# Patient Record
Sex: FEMALE | Race: OTHER | HISPANIC OR LATINO | URBAN - METROPOLITAN AREA
[De-identification: names, ages, dates, MRNs, and addresses within clinical notes are randomized per-mention and may not be internally consistent; named-entity substitution may affect disease eponyms.]

---

## 2022-07-17 ENCOUNTER — INPATIENT (INPATIENT)
Facility: HOSPITAL | Age: 22
LOS: 35 days | Discharge: HOME | DRG: 885 | End: 2022-08-22
Attending: PSYCHIATRY & NEUROLOGY | Admitting: PSYCHIATRY & NEUROLOGY
Payer: COMMERCIAL

## 2022-07-17 VITALS
TEMPERATURE: 98 F | DIASTOLIC BLOOD PRESSURE: 81 MMHG | OXYGEN SATURATION: 100 % | SYSTOLIC BLOOD PRESSURE: 121 MMHG | HEART RATE: 101 BPM | RESPIRATION RATE: 18 BRPM

## 2022-07-17 RX ORDER — DIPHENHYDRAMINE HCL 50 MG
50 CAPSULE ORAL ONCE
Refills: 0 | Status: COMPLETED | OUTPATIENT
Start: 2022-07-17 | End: 2022-07-17

## 2022-07-17 RX ORDER — HALOPERIDOL DECANOATE 100 MG/ML
5 INJECTION INTRAMUSCULAR ONCE
Refills: 0 | Status: COMPLETED | OUTPATIENT
Start: 2022-07-17 | End: 2022-07-17

## 2022-07-17 RX ORDER — DIPHENHYDRAMINE HCL 50 MG
50 CAPSULE ORAL EVERY 6 HOURS
Refills: 0 | Status: DISCONTINUED | OUTPATIENT
Start: 2022-07-17 | End: 2022-08-22

## 2022-07-17 RX ORDER — TRAZODONE HCL 50 MG
50 TABLET ORAL AT BEDTIME
Refills: 0 | Status: DISCONTINUED | OUTPATIENT
Start: 2022-07-17 | End: 2022-08-22

## 2022-07-17 RX ORDER — HALOPERIDOL DECANOATE 100 MG/ML
5 INJECTION INTRAMUSCULAR EVERY 6 HOURS
Refills: 0 | Status: DISCONTINUED | OUTPATIENT
Start: 2022-07-17 | End: 2022-08-15

## 2022-07-17 RX ORDER — ACETAMINOPHEN 500 MG
650 TABLET ORAL EVERY 6 HOURS
Refills: 0 | Status: DISCONTINUED | OUTPATIENT
Start: 2022-07-17 | End: 2022-08-22

## 2022-07-17 RX ORDER — MAGNESIUM HYDROXIDE 400 MG/1
30 TABLET, CHEWABLE ORAL DAILY
Refills: 0 | Status: DISCONTINUED | OUTPATIENT
Start: 2022-07-17 | End: 2022-08-22

## 2022-07-17 RX ADMIN — Medication 50 MILLIGRAM(S): at 22:29

## 2022-07-17 NOTE — BH PATIENT PROFILE - FALL HARM RISK - UNIVERSAL INTERVENTIONS
Instruct patient to call for assistance before getting out of bed or chair/Non-slip footwear when patient is out of bed/Physically safe environment - no spills, clutter or unnecessary equipment/Purposeful Proactive Rounding/Room/bathroom lighting operational, light cord in reach
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
## 2022-07-18 LAB
A1C WITH ESTIMATED AVERAGE GLUCOSE RESULT: 4.7 % — SIGNIFICANT CHANGE UP (ref 4–5.6)
CHOLEST SERPL-MCNC: 147 MG/DL — SIGNIFICANT CHANGE UP
COVID-19 SPIKE DOMAIN AB INTERP: POSITIVE
COVID-19 SPIKE DOMAIN ANTIBODY RESULT: >250 U/ML — HIGH
ESTIMATED AVERAGE GLUCOSE: 88 MG/DL — SIGNIFICANT CHANGE UP (ref 68–114)
HDLC SERPL-MCNC: 47 MG/DL — LOW
LIPID PNL WITH DIRECT LDL SERPL: 89 MG/DL — SIGNIFICANT CHANGE UP
NON HDL CHOLESTEROL: 100 MG/DL — SIGNIFICANT CHANGE UP
SARS-COV-2 IGG+IGM SERPL QL IA: >250 U/ML — HIGH
SARS-COV-2 IGG+IGM SERPL QL IA: POSITIVE
SARS-COV-2 RNA SPEC QL NAA+PROBE: SIGNIFICANT CHANGE UP
TRIGL SERPL-MCNC: 57 MG/DL — SIGNIFICANT CHANGE UP
TSH SERPL-MCNC: 1.3 UIU/ML — SIGNIFICANT CHANGE UP (ref 0.27–4.2)

## 2022-07-18 PROCEDURE — 99221 1ST HOSP IP/OBS SF/LOW 40: CPT

## 2022-07-18 RX ORDER — VENLAFAXINE HCL 75 MG
37.5 CAPSULE, EXT RELEASE 24 HR ORAL AT BEDTIME
Refills: 0 | Status: DISCONTINUED | OUTPATIENT
Start: 2022-07-18 | End: 2022-07-20

## 2022-07-18 RX ADMIN — Medication 650 MILLIGRAM(S): at 22:30

## 2022-07-18 RX ADMIN — Medication 650 MILLIGRAM(S): at 21:33

## 2022-07-18 RX ADMIN — Medication 37.5 MILLIGRAM(S): at 22:01

## 2022-07-18 NOTE — BH INPATIENT PSYCHIATRY ASSESSMENT NOTE - NSTXPSYCHOINTERMD_PSY_ALL_CORE
Individual psychotherapy with patient to assist pt in understanding AVH and other psychotic symptoms. Ativan and prozac for catatonia/anxiety and depression/anxiety respectively . TOO if pt continues not to take meds.
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
## 2022-07-18 NOTE — BH INPATIENT PSYCHIATRY ASSESSMENT NOTE - NSBHMSELANG_PSY_A_CORE
Chaperone for Intimate Exam    1. Was chaperone offered as part of the rooming process? offered, declined   2. If Chaperone is declined by patient, NA: chaperone was available and exam completed  3.  Chaperone is n/a
Diagnostic Psa   Date Value Ref Range Status   05/05/2014 1.41 0.00 - 5.40 ng/mL Final   04/24/2013 1.3 0.0 - 4.0 ng/mL Final         Assessment: This is a 70 yo white male with h/o HTN, kidney stones, BPH w/LUTs on Proscar and off Flomax for unclear reasons with worsened LUTs likely due to being off Flomax and will resume. The proper dosing and SE were reviewed. He has a stable and normal PSA  even when corrected for use of 5ARI is elevated. Will continue with yearly follow-up and pt agrees. Plan:      1. F/U 1 yr for PSA  2.  Flomax 0.4 mg pod daily, #90 with 3 refills
No abnormalities noted
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
## 2022-07-18 NOTE — BH INPATIENT PSYCHIATRY ASSESSMENT NOTE - NSSUICPROTFACT_PSY_ALL_CORE
Responsibility to children, family, or others/Supportive social network of family or friends/Cultural, spiritual and/or moral attitudes against suicide/Engaged in work or school/Yarsani beliefs
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
## 2022-07-18 NOTE — BH INPATIENT PSYCHIATRY ASSESSMENT NOTE - NSBHMETABOLIC_PSY_ALL_CORE_FT
BMI: BMI (kg/m2): 22.9 (07-17-22 @ 22:37)  HbA1c: A1C with Estimated Average Glucose Result: 4.7 % (07-18-22 @ 08:41)    Glucose:   BP: 106/72 (07-18-22 @ 09:00) (106/72 - 121/81)  Lipid Panel: Date/Time: 07-18-22 @ 08:41  Cholesterol, Serum: 147  Direct LDL: --  HDL Cholesterol, Serum: 47  Total Cholesterol/HDL Ration Measurement: --  Triglycerides, Serum: 57   BMI: BMI (kg/m2): 22.9 (07-17-22 @ 22:37)  HbA1c: A1C with Estimated Average Glucose Result: 4.7 % (07-18-22 @ 08:41)    Glucose:   BP: 107/72 (08-22-22 @ 09:00) (105/71 - 110/73)  Lipid Panel: Date/Time: 07-18-22 @ 08:41  Cholesterol, Serum: 147  Direct LDL: --  HDL Cholesterol, Serum: 47  Total Cholesterol/HDL Ration Measurement: --  Triglycerides, Serum: 57

## 2022-07-18 NOTE — BH INPATIENT PSYCHIATRY ASSESSMENT NOTE - CURRENT MEDICATION
MEDICATIONS  (STANDING):  venlafaxine 37.5 milliGRAM(s) Oral at bedtime    MEDICATIONS  (PRN):  acetaminophen     Tablet .. 650 milliGRAM(s) Oral every 6 hours PRN Moderate Pain (4 - 6)  aluminum hydroxide/magnesium hydroxide/simethicone Suspension 30 milliLiter(s) Oral every 4 hours PRN Dyspepsia  diphenhydrAMINE 50 milliGRAM(s) Oral every 6 hours PRN agitation  haloperidol     Tablet 5 milliGRAM(s) Oral every 6 hours PRN agitation  LORazepam     Tablet 2 milliGRAM(s) Oral every 6 hours PRN agitation  magnesium hydroxide Suspension 30 milliLiter(s) Oral daily PRN Constipation  traZODone 50 milliGRAM(s) Oral at bedtime PRN insomnia   MEDICATIONS  (STANDING):    MEDICATIONS  (PRN):

## 2022-07-18 NOTE — BH INPATIENT PSYCHIATRY ASSESSMENT NOTE - NSBHCHARTREVIEWVS_PSY_A_CORE FT
Vital Signs Last 24 Hrs  T(C): 36.3 (07-18-22 @ 09:00), Max: 36.8 (07-17-22 @ 22:36)  T(F): 97.4 (07-18-22 @ 09:00), Max: 98.2 (07-17-22 @ 22:36)  HR: 105 (07-18-22 @ 09:00) (101 - 105)  BP: 106/72 (07-18-22 @ 09:00) (106/72 - 121/81)  BP(mean): --  RR: 18 (07-18-22 @ 09:00) (18 - 18)  SpO2: 100% (07-18-22 @ 09:00) (100% - 100%)     Vital Signs Last 24 Hrs  T(C): --  T(F): --  HR: --  BP: --  BP(mean): --  RR: --  SpO2: --
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
## 2022-07-18 NOTE — BH INPATIENT PSYCHIATRY ASSESSMENT NOTE - NSDCCRITERIA_PSY_ALL_CORE
Pt with improvement in depressive symptoms, no longer endorsing suicidality, decreased psychosis. Able to function safely and care for basic needs.

## 2022-07-18 NOTE — BH INPATIENT PSYCHIATRY ASSESSMENT NOTE - NSBHASSESSSUMMFT_PSY_ALL_CORE
Sharona Kumar is a 21 yo female with PMH asthma and PPH MDD, ODD, and cannabis use disorder who was bib mother to [facility] for concerns that pt was not eating, drinking, or acting like herself. Patient seen today where she demonstrates findings of subjective and objectively depressed affect, anhedonia, poor eating, sleeping, concentration, energy, and passive SI over a few weeks' duration. Patient also has some psychotic features including potential internal preoccupation and reports of AH. Findings consistent with major depressive episode with psychotic features. Patient considered to be of potential risk to herself due to depression and suicidality as well as unable to care for herself as she is not tending to her basic needs, requiring admission and treatment.     Plan:  #MDE with psychotic features  -Effexor 37.5 mg nightly for depression and anxiety  -Considering addition of SGA such as Abilify for psychotic features   -Encourage pt to attend groups and adhere to medications   -Attempt to gather further detailed history, establish rapport with pt   -Safety planning prior to discharge

## 2022-07-18 NOTE — BH INPATIENT PSYCHIATRY ASSESSMENT NOTE - NSACTIVEVENT_PSY_ALL_CORE
Triggering events leading to humiliation, shame, and/or despair (e.g., Loss of relationship, financial or health status) (real or anticipated)/Current sexual/physical abuse or other trauma/Current or pending social isolation/Substance intoxication or withdrawal

## 2022-07-18 NOTE — BH INPATIENT PSYCHIATRY ASSESSMENT NOTE - RISK ASSESSMENT
Violence: Low to moderate, pt recently agitated and required seclusion   Suicide: Moderate as pt meets criteria for MDE and reports passive suicidality  Self-harm: Moderate as pt has no history of prior self-injury but has recently been banging on walls which could lead to self-injury   Elopement: High, patient attempted to previously elope and fixated on discharge   Sexual: Low, no signs or concerning sexual behaviors
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
## 2022-07-18 NOTE — BH INPATIENT PSYCHIATRY ASSESSMENT NOTE - NSTXANXINTERMD_PSY_ALL_CORE
Educate pt on management of anxiety and panic attack treatment. Ativan PRN. Prozac for anxiety management and Zyprexa initiated for paranoia that appears to contribute to anxiety. Haldol 2mg bid initiated. PRNs available as needed

## 2022-07-18 NOTE — BH SOCIAL WORK INITIAL PSYCHOSOCIAL EVALUATION - OTHER PAST PSYCHIATRIC HISTORY (INCLUDE DETAILS REGARDING ONSET, COURSE OF ILLNESS, INPATIENT/OUTPATIENT TREATMENT)
PPH MDD, ODD, and cannabis use disorder who was BIB mother to [facility] for concerns that pt was not eating, drinking, or acting like herself.

## 2022-07-18 NOTE — CHART NOTE - NSCHARTNOTEFT_GEN_A_CORE
General: patient in no acute distress.   HEENT: no conjunctival erythema. No scleral icterus.   Respiratory: clear to auscultation bilaterally. No wheezes, rales, or rhonchi. Symmetric chest rise.   Cardiac: Normal S1 and S2. Regular rate and rhythm. No S3 or S4. No murmurs, rubs, or gallops.   GI: Soft. Non distended. Mildly tender to palpation on the left side of the abdomen diffusely. No organomegaly. No surgical scars. Normal bowel sounds.   Musculoskeletal: No calf tenderness or swelling.  Extremities: Pulses 2+ bilaterally in the upper and lower extremities.    Neurological: Patient is A&Ox3. Cranial nerves intact. Motor and sensory functions intact.

## 2022-07-18 NOTE — BH INPATIENT PSYCHIATRY ASSESSMENT NOTE - NSTXDEPRESINTERMD_PSY_ALL_CORE
Individual psychotherapy, Prozac. Thus far pt attends group activities. Patient speaking with psychologist more. Will administer PCL-5 once pt is further stabilized and able to participate.

## 2022-07-19 PROCEDURE — 99232 SBSQ HOSP IP/OBS MODERATE 35: CPT

## 2022-07-19 RX ORDER — IBUPROFEN 200 MG
400 TABLET ORAL EVERY 6 HOURS
Refills: 0 | Status: DISCONTINUED | OUTPATIENT
Start: 2022-07-19 | End: 2022-08-22

## 2022-07-19 RX ADMIN — Medication 37.5 MILLIGRAM(S): at 21:11

## 2022-07-19 RX ADMIN — Medication 650 MILLIGRAM(S): at 16:17

## 2022-07-19 RX ADMIN — Medication 30 MILLILITER(S): at 16:11

## 2022-07-19 RX ADMIN — Medication 650 MILLIGRAM(S): at 10:14

## 2022-07-19 RX ADMIN — Medication 650 MILLIGRAM(S): at 11:14

## 2022-07-19 RX ADMIN — Medication 650 MILLIGRAM(S): at 17:04

## 2022-07-19 NOTE — BH INPATIENT PSYCHIATRY PROGRESS NOTE - NSBHASSESSSUMMFT_PSY_ALL_CORE
Sharona Kumar is a 23 yo female with PMH asthma and PPH MDD, ODD, and cannabis use disorder who was bib mother to [facility] for concerns that pt was not eating, drinking, or acting like herself. Patient seen today where she demonstrates findings of subjective and objectively depressed affect, anhedonia, poor eating, sleeping, concentration, energy, and passive SI over a few weeks' duration. Patient also has some psychotic features including potential internal preoccupation and reports of AH. Findings consistent with major depressive episode with psychotic features. Patient considered to be of potential risk to herself due to depression and suicidality as well as unable to care for herself as she is not tending to her basic needs, requiring admission and treatment.     Plan:  #MDE with psychotic features  -Effexor 37.5 mg nightly for depression and anxiety  -Considering addition of SGA such as Abilify for psychotic features   -Encourage pt to attend groups and adhere to medications   -Attempt to gather further detailed history, establish rapport with pt   -Safety planning prior to discharge

## 2022-07-19 NOTE — BH TREATMENT PLAN - NSPTSTATEDGOAL_PSY_ALL_CORE
Pt requests help with depressed mood, develops goal to eat and drink more water.  "I want to drink more water."

## 2022-07-19 NOTE — BH INPATIENT PSYCHIATRY PROGRESS NOTE - NSBHMETABOLIC_PSY_ALL_CORE_FT
BMI: BMI (kg/m2): 22.9 (07-17-22 @ 22:37)  HbA1c: A1C with Estimated Average Glucose Result: 4.7 % (07-18-22 @ 08:41)    Glucose:   BP: 119/82 (07-19-22 @ 16:51) (106/72 - 124/82)  Lipid Panel: Date/Time: 07-18-22 @ 08:41  Cholesterol, Serum: 147  Direct LDL: --  HDL Cholesterol, Serum: 47  Total Cholesterol/HDL Ration Measurement: --  Triglycerides, Serum: 57   BMI: BMI (kg/m2): 22.9 (07-17-22 @ 22:37)  HbA1c: A1C with Estimated Average Glucose Result: 4.7 % (07-18-22 @ 08:41)    Glucose:   BP: 107/72 (08-22-22 @ 09:00) (105/71 - 110/73)  Lipid Panel: Date/Time: 07-18-22 @ 08:41  Cholesterol, Serum: 147  Direct LDL: --  HDL Cholesterol, Serum: 47  Total Cholesterol/HDL Ration Measurement: --  Triglycerides, Serum: 57

## 2022-07-19 NOTE — BH INPATIENT PSYCHIATRY PROGRESS NOTE - PRN MEDS
MEDICATIONS  (PRN):  acetaminophen     Tablet .. 650 milliGRAM(s) Oral every 6 hours PRN Moderate Pain (4 - 6)  aluminum hydroxide/magnesium hydroxide/simethicone Suspension 30 milliLiter(s) Oral every 4 hours PRN Dyspepsia  diphenhydrAMINE 50 milliGRAM(s) Oral every 6 hours PRN agitation  haloperidol     Tablet 5 milliGRAM(s) Oral every 6 hours PRN agitation  LORazepam     Tablet 2 milliGRAM(s) Oral every 6 hours PRN agitation  magnesium hydroxide Suspension 30 milliLiter(s) Oral daily PRN Constipation  traZODone 50 milliGRAM(s) Oral at bedtime PRN insomnia   MEDICATIONS  (PRN):

## 2022-07-19 NOTE — BH INPATIENT PSYCHIATRY PROGRESS NOTE - NSBHCHARTREVIEWVS_PSY_A_CORE FT
Vital Signs Last 24 Hrs  T(C): 36.9 (07-19-22 @ 16:51), Max: 36.9 (07-19-22 @ 16:51)  T(F): 98.5 (07-19-22 @ 16:51), Max: 98.5 (07-19-22 @ 16:51)  HR: 83 (07-19-22 @ 16:51) (83 - 117)  BP: 119/82 (07-19-22 @ 16:51) (119/82 - 124/82)  BP(mean): --  RR: 18 (07-19-22 @ 16:51) (18 - 18)  SpO2: 100% (07-19-22 @ 16:51) (99% - 100%)     Vital Signs Last 24 Hrs  T(C): --  T(F): --  HR: --  BP: --  BP(mean): --  RR: --  SpO2: --

## 2022-07-19 NOTE — BH INPATIENT PSYCHIATRY PROGRESS NOTE - NSBHFUPINTERVALHXFT_PSY_A_CORE
Pt seen today in her room where she is quiet but speaks with writer more today and interacts more. She still reports feeling depressed but states that she is feeling a little better from going to groups and coloring which she enjoys. Pt denies SI/intent/plan today and says that she has a headache which she believes is from the medication; of note, pt has poor appetite and had been eating very little and drinking less water. She is encouraged to stick with the medication and continue eating and hydrating to prevent headaches and PRN medication for headache prescribed. Patient reports that her mother visited and she was happy to see her but otherwise likes to keep to herself on the unit. Pt is confused as to whether she has AH or negative thoughts and writer encourages pt to open up more about her possible AH.

## 2022-07-20 LAB
ALBUMIN SERPL ELPH-MCNC: 4.6 G/DL — SIGNIFICANT CHANGE UP (ref 3.3–5)
ALP SERPL-CCNC: 80 U/L — SIGNIFICANT CHANGE UP (ref 40–120)
ALT FLD-CCNC: 19 U/L — SIGNIFICANT CHANGE UP (ref 10–45)
ANION GAP SERPL CALC-SCNC: 11 MMOL/L — SIGNIFICANT CHANGE UP (ref 5–17)
AST SERPL-CCNC: 23 U/L — SIGNIFICANT CHANGE UP (ref 10–40)
BILIRUB SERPL-MCNC: 0.6 MG/DL — SIGNIFICANT CHANGE UP (ref 0.2–1.2)
BUN SERPL-MCNC: 4 MG/DL — LOW (ref 7–23)
CALCIUM SERPL-MCNC: 9.5 MG/DL — SIGNIFICANT CHANGE UP (ref 8.4–10.5)
CHLORIDE SERPL-SCNC: 103 MMOL/L — SIGNIFICANT CHANGE UP (ref 96–108)
CO2 SERPL-SCNC: 26 MMOL/L — SIGNIFICANT CHANGE UP (ref 22–31)
CREAT SERPL-MCNC: 0.72 MG/DL — SIGNIFICANT CHANGE UP (ref 0.5–1.3)
EGFR: 123 ML/MIN/1.73M2 — SIGNIFICANT CHANGE UP
GLUCOSE SERPL-MCNC: 110 MG/DL — HIGH (ref 70–99)
HCG UR QL: NEGATIVE — SIGNIFICANT CHANGE UP
HCT VFR BLD CALC: 46.2 % — HIGH (ref 34.5–45)
HGB BLD-MCNC: 15.4 G/DL — SIGNIFICANT CHANGE UP (ref 11.5–15.5)
HIV 1+2 AB+HIV1 P24 AG SERPL QL IA: SIGNIFICANT CHANGE UP
MCHC RBC-ENTMCNC: 31.1 PG — SIGNIFICANT CHANGE UP (ref 27–34)
MCHC RBC-ENTMCNC: 33.3 GM/DL — SIGNIFICANT CHANGE UP (ref 32–36)
MCV RBC AUTO: 93.3 FL — SIGNIFICANT CHANGE UP (ref 80–100)
NRBC # BLD: 0 /100 WBCS — SIGNIFICANT CHANGE UP (ref 0–0)
PLATELET # BLD AUTO: 217 K/UL — SIGNIFICANT CHANGE UP (ref 150–400)
POTASSIUM SERPL-MCNC: 3.6 MMOL/L — SIGNIFICANT CHANGE UP (ref 3.5–5.3)
POTASSIUM SERPL-SCNC: 3.6 MMOL/L — SIGNIFICANT CHANGE UP (ref 3.5–5.3)
PROT SERPL-MCNC: 7.5 G/DL — SIGNIFICANT CHANGE UP (ref 6–8.3)
RBC # BLD: 4.95 M/UL — SIGNIFICANT CHANGE UP (ref 3.8–5.2)
RBC # FLD: 11.9 % — SIGNIFICANT CHANGE UP (ref 10.3–14.5)
SODIUM SERPL-SCNC: 140 MMOL/L — SIGNIFICANT CHANGE UP (ref 135–145)
TSH SERPL-MCNC: 1.16 UIU/ML — SIGNIFICANT CHANGE UP (ref 0.27–4.2)
WBC # BLD: 5.83 K/UL — SIGNIFICANT CHANGE UP (ref 3.8–10.5)
WBC # FLD AUTO: 5.83 K/UL — SIGNIFICANT CHANGE UP (ref 3.8–10.5)

## 2022-07-20 PROCEDURE — 99232 SBSQ HOSP IP/OBS MODERATE 35: CPT

## 2022-07-20 RX ORDER — FLUOXETINE HCL 10 MG
10 CAPSULE ORAL DAILY
Refills: 0 | Status: DISCONTINUED | OUTPATIENT
Start: 2022-07-21 | End: 2022-07-21

## 2022-07-20 RX ORDER — VENLAFAXINE HCL 75 MG
37.5 CAPSULE, EXT RELEASE 24 HR ORAL ONCE
Refills: 0 | Status: COMPLETED | OUTPATIENT
Start: 2022-07-20 | End: 2022-07-20

## 2022-07-20 RX ADMIN — Medication 50 MILLIGRAM(S): at 21:47

## 2022-07-20 RX ADMIN — Medication 400 MILLIGRAM(S): at 18:18

## 2022-07-20 NOTE — BH INPATIENT PSYCHIATRY PROGRESS NOTE - NSBHCHARTREVIEWVS_PSY_A_CORE FT
Vital Signs Last 24 Hrs  T(C): 36.6 (07-20-22 @ 09:00), Max: 36.6 (07-20-22 @ 09:00)  T(F): 97.9 (07-20-22 @ 09:00), Max: 97.9 (07-20-22 @ 09:00)  HR: 96 (07-20-22 @ 09:00) (96 - 96)  BP: 116/78 (07-20-22 @ 09:00) (116/78 - 116/78)  BP(mean): --  RR: 18 (07-20-22 @ 09:00) (18 - 18)  SpO2: 98% (07-20-22 @ 09:00) (98% - 98%)     Vital Signs Last 24 Hrs  T(C): 36.9 (07-20-22 @ 17:21), Max: 36.9 (07-20-22 @ 17:21)  T(F): 98.5 (07-20-22 @ 17:21), Max: 98.5 (07-20-22 @ 17:21)  HR: 90 (07-20-22 @ 17:21) (90 - 96)  BP: 114/76 (07-20-22 @ 17:21) (114/76 - 116/78)  BP(mean): --  RR: 18 (07-20-22 @ 17:21) (18 - 18)  SpO2: 98% (07-20-22 @ 17:21) (98% - 98%)     Vital Signs Last 24 Hrs  T(C): --  T(F): --  HR: --  BP: --  BP(mean): --  RR: --  SpO2: --

## 2022-07-20 NOTE — BH INPATIENT PSYCHIATRY PROGRESS NOTE - NSBHMETABOLIC_PSY_ALL_CORE_FT
BMI: BMI (kg/m2): 22.9 (07-17-22 @ 22:37)  HbA1c: A1C with Estimated Average Glucose Result: 4.7 % (07-18-22 @ 08:41)    Glucose:   BP: 116/78 (07-20-22 @ 09:00) (106/72 - 124/82)  Lipid Panel: Date/Time: 07-18-22 @ 08:41  Cholesterol, Serum: 147  Direct LDL: --  HDL Cholesterol, Serum: 47  Total Cholesterol/HDL Ration Measurement: --  Triglycerides, Serum: 57   BMI: BMI (kg/m2): 22.9 (07-17-22 @ 22:37)  HbA1c: A1C with Estimated Average Glucose Result: 4.7 % (07-18-22 @ 08:41)    Glucose:   BP: 114/76 (07-20-22 @ 17:21) (106/72 - 124/82)  Lipid Panel: Date/Time: 07-18-22 @ 08:41  Cholesterol, Serum: 147  Direct LDL: --  HDL Cholesterol, Serum: 47  Total Cholesterol/HDL Ration Measurement: --  Triglycerides, Serum: 57   BMI: BMI (kg/m2): 22.9 (07-17-22 @ 22:37)  HbA1c: A1C with Estimated Average Glucose Result: 4.7 % (07-18-22 @ 08:41)    Glucose:   BP: 107/72 (08-22-22 @ 09:00) (105/71 - 110/73)  Lipid Panel: Date/Time: 07-18-22 @ 08:41  Cholesterol, Serum: 147  Direct LDL: --  HDL Cholesterol, Serum: 47  Total Cholesterol/HDL Ration Measurement: --  Triglycerides, Serum: 57

## 2022-07-20 NOTE — BH INPATIENT PSYCHIATRY PROGRESS NOTE - NSBHASSESSSUMMFT_PSY_ALL_CORE
Sharona Kumar is a 23 yo female with PMH asthma and PPH MDD, ODD, and cannabis use disorder who was bib mother to [facility] for concerns that pt was not eating, drinking, or acting like herself. Patient seen today where she demonstrates findings of subjective and objectively depressed affect, anhedonia, poor eating, sleeping, concentration, energy, and passive SI over a few weeks' duration. Patient also has some psychotic features including potential internal preoccupation and reports of AH. Findings consistent with major depressive episode with psychotic features. Patient considered to be of potential risk to herself due to depression and suicidality as well as unable to care for herself as she is not tending to her basic needs, requiring admission and treatment.     Plan:  #MDE with psychotic features  -Effexor 37.5 mg nightly for depression and anxiety  -Considering addition of SGA such as Abilify for psychotic features   -Encourage pt to attend groups and adhere to medications   -Attempt to gather further detailed history, establish rapport with pt   -Safety planning prior to discharge  Sharona Kumar is a 23 yo female with PMH asthma and PPH MDD, ODD, and cannabis use disorder who was bib mother to [facility] for concerns that pt was not eating, drinking, or acting like herself. Patient seen today where she demonstrates findings of subjective and objectively depressed affect, anhedonia, poor eating, sleeping, concentration, energy, and passive SI over a few weeks' duration. Patient also has some psychotic features including potential internal preoccupation and reports of AH. Findings consistent with major depressive episode with psychotic features. Patient considered to be of potential risk to herself due to depression and suicidality as well as unable to care for herself as she is not tending to her basic needs, requiring admission and treatment.     7/20: Pt more open today, speaks about intimate topics with writer, voices concerns of pregnancy. Denies depressed state and rejects medications but eventually agrees to keep adhering to meds with much encouragement. PO intake increasing. Pt denies SI.     Plan:  #MDE with psychotic features  -Effexor 37.5 mg nightly for depression and anxiety  -Will switch to Prozac 10 mg per pt request 7/21  -Considering addition of SGA such as Abilify for psychotic features   -Encourage pt to attend groups and adhere to medications   -Attempt to gather further detailed history, establish rapport with pt   -Safety planning prior to discharge

## 2022-07-20 NOTE — BH INPATIENT PSYCHIATRY PROGRESS NOTE - CURRENT MEDICATION
MEDICATIONS  (STANDING):  venlafaxine 37.5 milliGRAM(s) Oral once    MEDICATIONS  (PRN):  acetaminophen     Tablet .. 650 milliGRAM(s) Oral every 6 hours PRN Moderate Pain (4 - 6)  aluminum hydroxide/magnesium hydroxide/simethicone Suspension 30 milliLiter(s) Oral every 4 hours PRN Dyspepsia  diphenhydrAMINE 50 milliGRAM(s) Oral every 6 hours PRN agitation  haloperidol     Tablet 5 milliGRAM(s) Oral every 6 hours PRN agitation  ibuprofen  Tablet. 400 milliGRAM(s) Oral every 6 hours PRN headaches  LORazepam     Tablet 2 milliGRAM(s) Oral every 6 hours PRN agitation  magnesium hydroxide Suspension 30 milliLiter(s) Oral daily PRN Constipation  traZODone 50 milliGRAM(s) Oral at bedtime PRN insomnia   MEDICATIONS  (STANDING):    MEDICATIONS  (PRN):
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
## 2022-07-20 NOTE — BH INPATIENT PSYCHIATRY PROGRESS NOTE - PRN MEDS
MEDICATIONS  (PRN):  acetaminophen     Tablet .. 650 milliGRAM(s) Oral every 6 hours PRN Moderate Pain (4 - 6)  aluminum hydroxide/magnesium hydroxide/simethicone Suspension 30 milliLiter(s) Oral every 4 hours PRN Dyspepsia  diphenhydrAMINE 50 milliGRAM(s) Oral every 6 hours PRN agitation  haloperidol     Tablet 5 milliGRAM(s) Oral every 6 hours PRN agitation  ibuprofen  Tablet. 400 milliGRAM(s) Oral every 6 hours PRN headaches  LORazepam     Tablet 2 milliGRAM(s) Oral every 6 hours PRN agitation  magnesium hydroxide Suspension 30 milliLiter(s) Oral daily PRN Constipation  traZODone 50 milliGRAM(s) Oral at bedtime PRN insomnia   MEDICATIONS  (PRN):

## 2022-07-21 LAB — T PALLIDUM AB TITR SER: NEGATIVE — SIGNIFICANT CHANGE UP

## 2022-07-21 PROCEDURE — 99232 SBSQ HOSP IP/OBS MODERATE 35: CPT

## 2022-07-21 RX ORDER — FLUOXETINE HCL 10 MG
10 CAPSULE ORAL DAILY
Refills: 0 | Status: DISCONTINUED | OUTPATIENT
Start: 2022-07-21 | End: 2022-07-22

## 2022-07-21 RX ADMIN — Medication 10 MILLIGRAM(S): at 11:57

## 2022-07-21 NOTE — BH INPATIENT PSYCHIATRY PROGRESS NOTE - CURRENT MEDICATION
MEDICATIONS  (STANDING):  FLUoxetine 10 milliGRAM(s) Oral daily    MEDICATIONS  (PRN):  acetaminophen     Tablet .. 650 milliGRAM(s) Oral every 6 hours PRN Moderate Pain (4 - 6)  aluminum hydroxide/magnesium hydroxide/simethicone Suspension 30 milliLiter(s) Oral every 4 hours PRN Dyspepsia  diphenhydrAMINE 50 milliGRAM(s) Oral every 6 hours PRN agitation  haloperidol     Tablet 5 milliGRAM(s) Oral every 6 hours PRN agitation  ibuprofen  Tablet. 400 milliGRAM(s) Oral every 6 hours PRN headaches  LORazepam     Tablet 2 milliGRAM(s) Oral every 6 hours PRN agitation  magnesium hydroxide Suspension 30 milliLiter(s) Oral daily PRN Constipation  traZODone 50 milliGRAM(s) Oral at bedtime PRN insomnia

## 2022-07-21 NOTE — BH INPATIENT PSYCHIATRY PROGRESS NOTE - PRN MEDS
MEDICATIONS  (PRN):  acetaminophen     Tablet .. 650 milliGRAM(s) Oral every 6 hours PRN Moderate Pain (4 - 6)  aluminum hydroxide/magnesium hydroxide/simethicone Suspension 30 milliLiter(s) Oral every 4 hours PRN Dyspepsia  diphenhydrAMINE 50 milliGRAM(s) Oral every 6 hours PRN agitation  haloperidol     Tablet 5 milliGRAM(s) Oral every 6 hours PRN agitation  ibuprofen  Tablet. 400 milliGRAM(s) Oral every 6 hours PRN headaches  LORazepam     Tablet 2 milliGRAM(s) Oral every 6 hours PRN agitation  magnesium hydroxide Suspension 30 milliLiter(s) Oral daily PRN Constipation  traZODone 50 milliGRAM(s) Oral at bedtime PRN insomnia

## 2022-07-21 NOTE — BH INPATIENT PSYCHIATRY PROGRESS NOTE - NSBHASSESSSUMMFT_PSY_ALL_CORE
Sharona Kumar is a 21 yo female with PMH asthma and PPH MDD, ODD, and cannabis use disorder who was bib mother to [facility] for concerns that pt was not eating, drinking, or acting like herself. Patient seen today where she demonstrates findings of subjective and objectively depressed affect, anhedonia, poor eating, sleeping, concentration, energy, and passive SI over a few weeks' duration. Patient also has some psychotic features including potential internal preoccupation and reports of AH. Findings consistent with major depressive episode with psychotic features. Patient considered to be of potential risk to herself due to depression and suicidality as well as unable to care for herself as she is not tending to her basic needs, requiring admission and treatment.     7/20: Pt more open today, speaks about intimate topics with writer, voices concerns of pregnancy. Denies depressed state and rejects medications but eventually agrees to keep adhering to meds with much encouragement. PO intake increasing. Pt denies SI.     Plan:  #MDE with psychotic features  -Effexor 37.5 mg nightly for depression and anxiety  -Will switch to Prozac 10 mg per pt request 7/21  -Considering addition of SGA such as Abilify for psychotic features   -Encourage pt to attend groups and adhere to medications   -Attempt to gather further detailed history, establish rapport with pt   -Safety planning prior to discharge

## 2022-07-21 NOTE — BH INPATIENT PSYCHIATRY PROGRESS NOTE - NSBHCHARTREVIEWVS_PSY_A_CORE FT
Vital Signs Last 24 Hrs  T(C): 37.1 (07-21-22 @ 08:22), Max: 37.1 (07-21-22 @ 08:22)  T(F): 98.7 (07-21-22 @ 08:22), Max: 98.7 (07-21-22 @ 08:22)  HR: 111 (07-21-22 @ 08:22) (90 - 111)  BP: 98/62 (07-21-22 @ 08:22) (98/62 - 114/76)  BP(mean): --  RR: 18 (07-21-22 @ 08:22) (18 - 18)  SpO2: 98% (07-21-22 @ 08:22) (98% - 98%)

## 2022-07-21 NOTE — BH INPATIENT PSYCHIATRY PROGRESS NOTE - NSBHMETABOLIC_PSY_ALL_CORE_FT
BMI: BMI (kg/m2): 22.9 (07-17-22 @ 22:37)  HbA1c: A1C with Estimated Average Glucose Result: 4.7 % (07-18-22 @ 08:41)    Glucose:   BP: 98/62 (07-21-22 @ 08:22) (98/62 - 124/82)  Lipid Panel: Date/Time: 07-18-22 @ 08:41  Cholesterol, Serum: 147  Direct LDL: --  HDL Cholesterol, Serum: 47  Total Cholesterol/HDL Ration Measurement: --  Triglycerides, Serum: 57

## 2022-07-21 NOTE — BH INPATIENT PSYCHIATRY PROGRESS NOTE - NSBHFUPINTERVALHXFT_PSY_A_CORE
Patient seen out on the milieu today, interactive on approach, very soft spoken, appears sad. She reports mood is 'neutral' and denies sxs of depression. She endorses some anxiety usually, but denies currently. She states that she is no longer having si, denies hi/avh or PI and feels that she has been getting better since being admitted. She reports sleep has improved and that appetite has improved as well, but did not have dinner last night and only had a mouthful of eggs for breakfast. She shared that she had lost ~20lbs recently due to poor appetite. No acute medical concerns endorsed however she reports side effects with current medication regimen, feeling headache and nausea. We spoke in depth about potential side effects of current regimen at her request. She shared that she felt that she didn't need medication as she is not depressed. She is willing to speak with psychology and attend groups if needed.   Per staff report pt has been more visible on the unit, appears constricted with decreased speech productivity.

## 2022-07-22 LAB — SARS-COV-2 RNA SPEC QL NAA+PROBE: SIGNIFICANT CHANGE UP

## 2022-07-22 RX ORDER — FLUOXETINE HCL 10 MG
20 CAPSULE ORAL DAILY
Refills: 0 | Status: DISCONTINUED | OUTPATIENT
Start: 2022-07-22 | End: 2022-08-22

## 2022-07-22 RX ADMIN — Medication 20 MILLIGRAM(S): at 21:32

## 2022-07-22 RX ADMIN — Medication 10 MILLIGRAM(S): at 09:29
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
## 2022-07-23 RX ADMIN — Medication 20 MILLIGRAM(S): at 10:06

## 2022-07-23 RX ADMIN — Medication 650 MILLIGRAM(S): at 22:35

## 2022-07-23 RX ADMIN — Medication 650 MILLIGRAM(S): at 10:06

## 2022-07-23 RX ADMIN — Medication 650 MILLIGRAM(S): at 21:26

## 2022-07-23 RX ADMIN — Medication 650 MILLIGRAM(S): at 11:00

## 2022-07-23 RX ADMIN — Medication 50 MILLIGRAM(S): at 21:25

## 2022-07-24 PROCEDURE — 99232 SBSQ HOSP IP/OBS MODERATE 35: CPT

## 2022-07-24 RX ORDER — IBUPROFEN 200 MG
200 TABLET ORAL EVERY 6 HOURS
Refills: 0 | Status: DISCONTINUED | OUTPATIENT
Start: 2022-07-24 | End: 2022-08-22

## 2022-07-24 RX ORDER — DIPHENHYDRAMINE HCL 50 MG
50 CAPSULE ORAL ONCE
Refills: 0 | Status: COMPLETED | OUTPATIENT
Start: 2022-07-24 | End: 2022-07-24

## 2022-07-24 RX ADMIN — Medication 2 MILLIGRAM(S): at 09:03

## 2022-07-24 RX ADMIN — Medication 50 MILLIGRAM(S): at 21:14

## 2022-07-24 RX ADMIN — Medication 20 MILLIGRAM(S): at 09:03

## 2022-07-24 RX ADMIN — Medication 50 MILLIGRAM(S): at 09:35

## 2022-07-24 NOTE — BH INPATIENT PSYCHIATRY PROGRESS NOTE - CURRENT MEDICATION
MEDICATIONS  (STANDING):  FLUoxetine Solution 20 milliGRAM(s) Oral daily    MEDICATIONS  (PRN):  acetaminophen     Tablet .. 650 milliGRAM(s) Oral every 6 hours PRN Moderate Pain (4 - 6)  aluminum hydroxide/magnesium hydroxide/simethicone Suspension 30 milliLiter(s) Oral every 4 hours PRN Dyspepsia  diphenhydrAMINE 50 milliGRAM(s) Oral every 6 hours PRN agitation  haloperidol     Tablet 5 milliGRAM(s) Oral every 6 hours PRN agitation  ibuprofen  Tablet. 400 milliGRAM(s) Oral every 6 hours PRN headaches  LORazepam     Tablet 2 milliGRAM(s) Oral every 6 hours PRN agitation  magnesium hydroxide Suspension 30 milliLiter(s) Oral daily PRN Constipation  traZODone 50 milliGRAM(s) Oral at bedtime PRN insomnia   MEDICATIONS  (STANDING):    MEDICATIONS  (PRN):

## 2022-07-24 NOTE — BH INPATIENT PSYCHIATRY PROGRESS NOTE - NSBHFUPINTERVALHXFT_PSY_A_CORE
Patient seen out on the milieu today, interactive on approach, very soft spoken, appears sad. She reports mood is 'neutral' and denies sxs of depression. She endorses some anxiety usually, but denies currently. She states that she is no longer having si, denies hi/avh or PI and feels that she has been getting better since being admitted. She reports sleep has improved and that appetite has improved as well, but did not have dinner last night and only had a mouthful of eggs for breakfast. She shared that she had lost ~20lbs recently due to poor appetite. No acute medical concerns endorsed however she reports side effects with current medication regimen, feeling headache and nausea. We spoke in depth about potential side effects of current regimen at her request. She shared that she felt that she didn't need medication as she is not depressed. She is willing to speak with psychology and attend groups if needed.   Per staff report pt has been more visible on the unit, appears constricted with decreased speech productivity. Reporting abdominal pain. Pt seems likely constipated. Will order miralax. Pt worried psychotropics causing her abdominal pain. Poorly related.

## 2022-07-24 NOTE — BH INPATIENT PSYCHIATRY PROGRESS NOTE - NSBHMETABOLIC_PSY_ALL_CORE_FT
BMI: BMI (kg/m2): 22.9 (07-17-22 @ 22:37)  HbA1c: A1C with Estimated Average Glucose Result: 4.7 % (07-18-22 @ 08:41)    Glucose:   BP: 111/76 (07-24-22 @ 17:00) (111/76 - 147/82)  Lipid Panel: Date/Time: 07-18-22 @ 08:41  Cholesterol, Serum: 147  Direct LDL: --  HDL Cholesterol, Serum: 47  Total Cholesterol/HDL Ration Measurement: --  Triglycerides, Serum: 57   BMI: BMI (kg/m2): 22.9 (07-17-22 @ 22:37)  HbA1c: A1C with Estimated Average Glucose Result: 4.7 % (07-18-22 @ 08:41)    Glucose:   BP: 107/72 (08-22-22 @ 09:00) (105/71 - 110/73)  Lipid Panel: Date/Time: 07-18-22 @ 08:41  Cholesterol, Serum: 147  Direct LDL: --  HDL Cholesterol, Serum: 47  Total Cholesterol/HDL Ration Measurement: --  Triglycerides, Serum: 57

## 2022-07-24 NOTE — BH INPATIENT PSYCHIATRY PROGRESS NOTE - NSBHCHARTREVIEWVS_PSY_A_CORE FT
Vital Signs Last 24 Hrs  T(C): 36.9 (07-24-22 @ 17:00), Max: 36.9 (07-24-22 @ 17:00)  T(F): 98.5 (07-24-22 @ 17:00), Max: 98.5 (07-24-22 @ 17:00)  HR: 105 (07-24-22 @ 17:00) (105 - 114)  BP: 111/76 (07-24-22 @ 17:00) (111/76 - 126/87)  BP(mean): --  RR: 18 (07-24-22 @ 17:00) (18 - 18)  SpO2: 100% (07-24-22 @ 17:00) (100% - 100%)     Vital Signs Last 24 Hrs  T(C): --  T(F): --  HR: --  BP: --  BP(mean): --  RR: --  SpO2: --

## 2022-07-24 NOTE — BH INPATIENT PSYCHIATRY PROGRESS NOTE - NSBHASSESSSUMMFT_PSY_ALL_CORE
Sharona Kumar is a 21 yo female with PMH asthma and PPH MDD, ODD, and cannabis use disorder who was bib mother to [facility] for concerns that pt was not eating, drinking, or acting like herself. Patient seen today where she demonstrates findings of subjective and objectively depressed affect, anhedonia, poor eating, sleeping, concentration, energy, and passive SI over a few weeks' duration. Patient also has some psychotic features including potential internal preoccupation and reports of AH. Findings consistent with major depressive episode with psychotic features. Patient considered to be of potential risk to herself due to depression and suicidality as well as unable to care for herself as she is not tending to her basic needs, requiring admission and treatment.     7/20: Pt more open today, speaks about intimate topics with writer, voices concerns of pregnancy. Denies depressed state and rejects medications but eventually agrees to keep adhering to meds with much encouragement. PO intake increasing. Pt denies SI.     Plan:  #MDE with psychotic features  -Effexor 37.5 mg nightly for depression and anxiety  -Will switch to Prozac 10 mg per pt request 7/21  -Considering addition of SGA such as Abilify for psychotic features   -Encourage pt to attend groups and adhere to medications   -Attempt to gather further detailed history, establish rapport with pt   -Safety planning prior to discharge

## 2022-07-25 PROCEDURE — 99232 SBSQ HOSP IP/OBS MODERATE 35: CPT

## 2022-07-25 RX ORDER — OLANZAPINE 15 MG/1
2.5 TABLET, FILM COATED ORAL AT BEDTIME
Refills: 0 | Status: DISCONTINUED | OUTPATIENT
Start: 2022-07-25 | End: 2022-07-27

## 2022-07-25 RX ADMIN — Medication 650 MILLIGRAM(S): at 22:24

## 2022-07-25 RX ADMIN — Medication 650 MILLIGRAM(S): at 23:25

## 2022-07-25 RX ADMIN — OLANZAPINE 2.5 MILLIGRAM(S): 15 TABLET, FILM COATED ORAL at 21:01

## 2022-07-25 RX ADMIN — Medication 2 MILLIGRAM(S): at 12:13

## 2022-07-25 RX ADMIN — Medication 20 MILLIGRAM(S): at 10:45

## 2022-07-25 NOTE — BH INPATIENT PSYCHIATRY PROGRESS NOTE - NSBHCHARTREVIEWVS_PSY_A_CORE FT
Vital Signs Last 24 Hrs  T(C): 36.8 (07-25-22 @ 09:00), Max: 36.9 (07-24-22 @ 17:00)  T(F): 98.3 (07-25-22 @ 09:00), Max: 98.5 (07-24-22 @ 17:00)  HR: 83 (07-25-22 @ 09:00) (83 - 105)  BP: 112/69 (07-25-22 @ 09:00) (111/76 - 112/69)  BP(mean): --  RR: 18 (07-25-22 @ 09:00) (18 - 18)  SpO2: 100% (07-25-22 @ 09:00) (100% - 100%)     Vital Signs Last 24 Hrs  T(C): --  T(F): --  HR: --  BP: --  BP(mean): --  RR: --  SpO2: --

## 2022-07-25 NOTE — BH INPATIENT PSYCHIATRY PROGRESS NOTE - NSBHASSESSSUMMFT_PSY_ALL_CORE
Sharona Kumar is a 23 yo female with PMH asthma and PPH MDD, ODD, and cannabis use disorder who was bib mother to [facility] for concerns that pt was not eating, drinking, or acting like herself. Patient seen today where she demonstrates findings of subjective and objectively depressed affect, anhedonia, poor eating, sleeping, concentration, energy, and passive SI over a few weeks' duration. Patient also has some psychotic features including potential internal preoccupation and reports of AH. Findings consistent with major depressive episode with psychotic features. Patient considered to be of potential risk to herself due to depression and suicidality as well as unable to care for herself as she is not tending to her basic needs, requiring admission and treatment.     7/20: Pt more open today, speaks about intimate topics with writer, voices concerns of pregnancy. Denies depressed state and rejects medications but eventually agrees to keep adhering to meds with much encouragement. PO intake increasing. Pt denies SI.     7/25: Pt suffering panic attacks on the unit. Childlike behaviors noted, pt remains anxious and appears depressed. Ativan PRN given with much resistance from the patient.     Plan:  #MDE with psychotic features  -Prozac 20 mg for depressive and anxious features   -Considering addition of SGA such as Abilify for psychotic features   -Encourage pt to attend groups and adhere to medications   -Attempt to gather further detailed history, establish rapport with pt   -Safety planning prior to discharge   -Ativan PRN 2 mg for anxiety/panic

## 2022-07-25 NOTE — BH INPATIENT PSYCHIATRY PROGRESS NOTE - PRN MEDS
MEDICATIONS  (PRN):  acetaminophen     Tablet .. 650 milliGRAM(s) Oral every 6 hours PRN Mild Pain (1-3)  aluminum hydroxide/magnesium hydroxide/simethicone Suspension 30 milliLiter(s) Oral every 4 hours PRN Dyspepsia  diphenhydrAMINE 50 milliGRAM(s) Oral every 6 hours PRN agitation  haloperidol     Tablet 5 milliGRAM(s) Oral every 6 hours PRN agitation  ibuprofen  Tablet. 200 milliGRAM(s) Oral every 6 hours PRN Moderate Pain (4 - 6)  ibuprofen  Tablet. 400 milliGRAM(s) Oral every 6 hours PRN headaches  LORazepam     Tablet 2 milliGRAM(s) Oral every 6 hours PRN agitation  magnesium hydroxide Suspension 30 milliLiter(s) Oral daily PRN Constipation  traZODone 50 milliGRAM(s) Oral at bedtime PRN insomnia   MEDICATIONS  (PRN):

## 2022-07-25 NOTE — BH INPATIENT PSYCHIATRY PROGRESS NOTE - NSBHFUPINTERVALHXFT_PSY_A_CORE
Pt seen today where she is having a panic attack with tremulousness, tachypnea and tachycardia, unable to speak with writer and having difficulty following commands. With much coaxing pt is able to swallow her ativan pill orally but requires maximum assistance with doing so. Noted to be quite childlike with writer, says that she wants her mother repeatedly and write reassures pt that the mother will be contacted. Pt taken to room to rest, seen later on the unit calling her mother over the phone and writer briefly visited with her.

## 2022-07-25 NOTE — BH INPATIENT PSYCHIATRY PROGRESS NOTE - NSBHMETABOLIC_PSY_ALL_CORE_FT
BMI: BMI (kg/m2): 22.9 (07-17-22 @ 22:37)  HbA1c: A1C with Estimated Average Glucose Result: 4.7 % (07-18-22 @ 08:41)    Glucose:   BP: 112/69 (07-25-22 @ 09:00) (111/76 - 147/82)  Lipid Panel: Date/Time: 07-18-22 @ 08:41  Cholesterol, Serum: 147  Direct LDL: --  HDL Cholesterol, Serum: 47  Total Cholesterol/HDL Ration Measurement: --  Triglycerides, Serum: 57   BMI: BMI (kg/m2): 22.9 (07-17-22 @ 22:37)  HbA1c: A1C with Estimated Average Glucose Result: 4.7 % (07-18-22 @ 08:41)    Glucose:   BP: 107/72 (08-22-22 @ 09:00) (105/71 - 110/73)  Lipid Panel: Date/Time: 07-18-22 @ 08:41  Cholesterol, Serum: 147  Direct LDL: --  HDL Cholesterol, Serum: 47  Total Cholesterol/HDL Ration Measurement: --  Triglycerides, Serum: 57

## 2022-07-25 NOTE — BH INPATIENT PSYCHIATRY PROGRESS NOTE - CURRENT MEDICATION
MEDICATIONS  (STANDING):  FLUoxetine Solution 20 milliGRAM(s) Oral daily    MEDICATIONS  (PRN):  acetaminophen     Tablet .. 650 milliGRAM(s) Oral every 6 hours PRN Mild Pain (1-3)  aluminum hydroxide/magnesium hydroxide/simethicone Suspension 30 milliLiter(s) Oral every 4 hours PRN Dyspepsia  diphenhydrAMINE 50 milliGRAM(s) Oral every 6 hours PRN agitation  haloperidol     Tablet 5 milliGRAM(s) Oral every 6 hours PRN agitation  ibuprofen  Tablet. 200 milliGRAM(s) Oral every 6 hours PRN Moderate Pain (4 - 6)  ibuprofen  Tablet. 400 milliGRAM(s) Oral every 6 hours PRN headaches  LORazepam     Tablet 2 milliGRAM(s) Oral every 6 hours PRN agitation  magnesium hydroxide Suspension 30 milliLiter(s) Oral daily PRN Constipation  traZODone 50 milliGRAM(s) Oral at bedtime PRN insomnia   MEDICATIONS  (STANDING):    MEDICATIONS  (PRN):

## 2022-07-26 PROCEDURE — 99232 SBSQ HOSP IP/OBS MODERATE 35: CPT

## 2022-07-26 RX ADMIN — Medication 650 MILLIGRAM(S): at 21:07

## 2022-07-26 RX ADMIN — Medication 650 MILLIGRAM(S): at 22:04

## 2022-07-26 RX ADMIN — OLANZAPINE 2.5 MILLIGRAM(S): 15 TABLET, FILM COATED ORAL at 21:05

## 2022-07-26 RX ADMIN — Medication 20 MILLIGRAM(S): at 10:34
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
## 2022-07-26 NOTE — BH INPATIENT PSYCHIATRY PROGRESS NOTE - NSBHASSESSSUMMFT_PSY_ALL_CORE
Sharona Kumar is a 23 yo female with PMH asthma and PPH MDD, ODD, and cannabis use disorder who was bib mother to [facility] for concerns that pt was not eating, drinking, or acting like herself. Patient seen today where she demonstrates findings of subjective and objectively depressed affect, anhedonia, poor eating, sleeping, concentration, energy, and passive SI over a few weeks' duration. Patient also has some psychotic features including potential internal preoccupation and reports of AH. Findings consistent with major depressive episode with psychotic features. Patient considered to be of potential risk to herself due to depression and suicidality as well as unable to care for herself as she is not tending to her basic needs, requiring admission and treatment.     7/20: Pt more open today, speaks about intimate topics with writer, voices concerns of pregnancy. Denies depressed state and rejects medications but eventually agrees to keep adhering to meds with much encouragement. PO intake increasing. Pt denies SI.     7/25: Pt suffering panic attacks on the unit. Childlike behaviors noted, pt remains anxious and appears depressed. Ativan PRN given with much resistance from the patient.     7/26: Pt calmer, still appearing anxious and depressed. Reports paranoia, opens up more about recent stressors and traumas. Agreed to continue Prozac and started Zyprexa last night with no AE reported. Eating and drinking slightly more.     Plan:  #MDE with psychotic features  -Prozac 20 mg for depressive and anxious features   -Zyprexa 2.5 mg for psychosis/paranoia   -Encourage pt to attend groups and adhere to medications   -Attempt to gather further detailed history, establish rapport with pt   -Safety planning prior to discharge   -Ativan PRN 2 mg for anxiety/panic

## 2022-07-26 NOTE — BH INPATIENT PSYCHIATRY PROGRESS NOTE - PRN MEDS
MEDICATIONS  (PRN):  acetaminophen     Tablet .. 650 milliGRAM(s) Oral every 6 hours PRN Mild Pain (1-3)  aluminum hydroxide/magnesium hydroxide/simethicone Suspension 30 milliLiter(s) Oral every 4 hours PRN Dyspepsia  diphenhydrAMINE 50 milliGRAM(s) Oral every 6 hours PRN agitation  haloperidol     Tablet 5 milliGRAM(s) Oral every 6 hours PRN agitation  ibuprofen  Tablet. 200 milliGRAM(s) Oral every 6 hours PRN Moderate Pain (4 - 6)  ibuprofen  Tablet. 400 milliGRAM(s) Oral every 6 hours PRN headaches  LORazepam     Tablet 2 milliGRAM(s) Oral every 6 hours PRN agitation or anxiety/panic  magnesium hydroxide Suspension 30 milliLiter(s) Oral daily PRN Constipation  traZODone 50 milliGRAM(s) Oral at bedtime PRN insomnia   MEDICATIONS  (PRN):

## 2022-07-26 NOTE — BH INPATIENT PSYCHIATRY PROGRESS NOTE - NSBHCHARTREVIEWVS_PSY_A_CORE FT
Vital Signs Last 24 Hrs  T(C): 36.7 (07-26-22 @ 08:42), Max: 36.7 (07-26-22 @ 08:42)  T(F): 98 (07-26-22 @ 08:42), Max: 98 (07-26-22 @ 08:42)  HR: 108 (07-26-22 @ 08:42) (108 - 108)  BP: 126/81 (07-26-22 @ 08:42) (126/81 - 126/81)  BP(mean): --  RR: 18 (07-26-22 @ 08:42) (18 - 18)  SpO2: 100% (07-26-22 @ 08:42) (100% - 100%)     Vital Signs Last 24 Hrs  T(C): --  T(F): --  HR: --  BP: --  BP(mean): --  RR: --  SpO2: --

## 2022-07-26 NOTE — BH INPATIENT PSYCHIATRY PROGRESS NOTE - CURRENT MEDICATION
MEDICATIONS  (STANDING):  FLUoxetine Solution 20 milliGRAM(s) Oral daily  OLANZapine 2.5 milliGRAM(s) Oral at bedtime    MEDICATIONS  (PRN):  acetaminophen     Tablet .. 650 milliGRAM(s) Oral every 6 hours PRN Mild Pain (1-3)  aluminum hydroxide/magnesium hydroxide/simethicone Suspension 30 milliLiter(s) Oral every 4 hours PRN Dyspepsia  diphenhydrAMINE 50 milliGRAM(s) Oral every 6 hours PRN agitation  haloperidol     Tablet 5 milliGRAM(s) Oral every 6 hours PRN agitation  ibuprofen  Tablet. 200 milliGRAM(s) Oral every 6 hours PRN Moderate Pain (4 - 6)  ibuprofen  Tablet. 400 milliGRAM(s) Oral every 6 hours PRN headaches  LORazepam     Tablet 2 milliGRAM(s) Oral every 6 hours PRN agitation or anxiety/panic  magnesium hydroxide Suspension 30 milliLiter(s) Oral daily PRN Constipation  traZODone 50 milliGRAM(s) Oral at bedtime PRN insomnia   MEDICATIONS  (STANDING):    MEDICATIONS  (PRN):
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
## 2022-07-26 NOTE — BH INPATIENT PSYCHIATRY PROGRESS NOTE - NSBHMETABOLIC_PSY_ALL_CORE_FT
BMI: BMI (kg/m2): 22.9 (07-17-22 @ 22:37)  HbA1c: A1C with Estimated Average Glucose Result: 4.7 % (07-18-22 @ 08:41)    Glucose:   BP: 126/81 (07-26-22 @ 08:42) (111/76 - 126/87)  Lipid Panel: Date/Time: 07-18-22 @ 08:41  Cholesterol, Serum: 147  Direct LDL: --  HDL Cholesterol, Serum: 47  Total Cholesterol/HDL Ration Measurement: --  Triglycerides, Serum: 57   BMI: BMI (kg/m2): 22.9 (07-17-22 @ 22:37)  HbA1c: A1C with Estimated Average Glucose Result: 4.7 % (07-18-22 @ 08:41)    Glucose:   BP: 107/72 (08-22-22 @ 09:00) (105/71 - 110/73)  Lipid Panel: Date/Time: 07-18-22 @ 08:41  Cholesterol, Serum: 147  Direct LDL: --  HDL Cholesterol, Serum: 47  Total Cholesterol/HDL Ration Measurement: --  Triglycerides, Serum: 57

## 2022-07-26 NOTE — BH TREATMENT PLAN - NSTXPSYCHOINTERSW_PSY_ALL_CORE
Encourage pt to adhere to medication regimen. Encourage pt to attend groups. Establish therapeutic relationship. Give pt opportunity to share feelings.DV counseling and referrals
Encourage pt to adhere to medication regimen. Encourage pt to attend groups. Establish therapeutic relationship. Give pt opportunity to share feelings.DV counseling and referrals

## 2022-07-27 PROCEDURE — 74019 RADEX ABDOMEN 2 VIEWS: CPT | Mod: 26

## 2022-07-27 PROCEDURE — 99232 SBSQ HOSP IP/OBS MODERATE 35: CPT

## 2022-07-27 RX ORDER — OLANZAPINE 15 MG/1
5 TABLET, FILM COATED ORAL AT BEDTIME
Refills: 0 | Status: DISCONTINUED | OUTPATIENT
Start: 2022-07-27 | End: 2022-08-16

## 2022-07-27 RX ORDER — HYDROXYZINE HCL 10 MG
25 TABLET ORAL EVERY 6 HOURS
Refills: 0 | Status: DISCONTINUED | OUTPATIENT
Start: 2022-07-27 | End: 2022-08-22

## 2022-07-27 RX ORDER — POLYETHYLENE GLYCOL 3350 17 G/17G
17 POWDER, FOR SOLUTION ORAL DAILY
Refills: 0 | Status: DISCONTINUED | OUTPATIENT
Start: 2022-07-27 | End: 2022-07-28

## 2022-07-27 RX ORDER — OLANZAPINE 15 MG/1
5 TABLET, FILM COATED ORAL AT BEDTIME
Refills: 0 | Status: DISCONTINUED | OUTPATIENT
Start: 2022-07-27 | End: 2022-07-27

## 2022-07-27 RX ADMIN — Medication 650 MILLIGRAM(S): at 15:00

## 2022-07-27 RX ADMIN — Medication 650 MILLIGRAM(S): at 14:07

## 2022-07-27 RX ADMIN — Medication 650 MILLIGRAM(S): at 21:01

## 2022-07-27 RX ADMIN — OLANZAPINE 5 MILLIGRAM(S): 15 TABLET, FILM COATED ORAL at 21:02

## 2022-07-27 RX ADMIN — POLYETHYLENE GLYCOL 3350 17 GRAM(S): 17 POWDER, FOR SOLUTION ORAL at 16:44

## 2022-07-27 RX ADMIN — Medication 650 MILLIGRAM(S): at 22:01

## 2022-07-27 NOTE — BH INPATIENT PSYCHIATRY PROGRESS NOTE - NSBHMETABOLIC_PSY_ALL_CORE_FT
BMI: BMI (kg/m2): 22.9 (07-17-22 @ 22:37)  HbA1c: A1C with Estimated Average Glucose Result: 4.7 % (07-18-22 @ 08:41)    Glucose:   BP: 112/71 (07-27-22 @ 09:35) (108/79 - 126/81)  Lipid Panel: Date/Time: 07-18-22 @ 08:41  Cholesterol, Serum: 147  Direct LDL: --  HDL Cholesterol, Serum: 47  Total Cholesterol/HDL Ration Measurement: --  Triglycerides, Serum: 57   BMI: BMI (kg/m2): 22.9 (07-17-22 @ 22:37)  HbA1c: A1C with Estimated Average Glucose Result: 4.7 % (07-18-22 @ 08:41)    Glucose:   BP: 107/72 (08-22-22 @ 09:00) (105/71 - 110/73)  Lipid Panel: Date/Time: 07-18-22 @ 08:41  Cholesterol, Serum: 147  Direct LDL: --  HDL Cholesterol, Serum: 47  Total Cholesterol/HDL Ration Measurement: --  Triglycerides, Serum: 57

## 2022-07-27 NOTE — BH INPATIENT PSYCHIATRY PROGRESS NOTE - NSBHASSESSSUMMFT_PSY_ALL_CORE
Sharona Kumar is a 23 yo female with PMH asthma and PPH MDD, ODD, and cannabis use disorder who was bib mother to [facility] for concerns that pt was not eating, drinking, or acting like herself. Patient seen today where she demonstrates findings of subjective and objectively depressed affect, anhedonia, poor eating, sleeping, concentration, energy, and passive SI over a few weeks' duration. Patient also has some psychotic features including potential internal preoccupation and reports of AH. Findings consistent with major depressive episode with psychotic features. Patient considered to be of potential risk to herself due to depression and suicidality as well as unable to care for herself as she is not tending to her basic needs, requiring admission and treatment.     7/20: Pt more open today, speaks about intimate topics with writer, voices concerns of pregnancy. Denies depressed state and rejects medications but eventually agrees to keep adhering to meds with much encouragement. PO intake increasing. Pt denies SI.     7/25: Pt suffering panic attacks on the unit. Childlike behaviors noted, pt remains anxious and appears depressed. Ativan PRN given with much resistance from the patient.     7/26: Pt calmer, still appearing anxious and depressed. Reports paranoia, opens up more about recent stressors and traumas. Agreed to continue Prozac and started Zyprexa last night with no AE reported. Eating and drinking slightly more.     7/27: Pt anxious, tearful, cannot participate in PCL 5. Disorganized/illogical with persecutory and somatic delusional content.     Plan:  #MDE with psychotic features  -Prozac 20 mg for depressive and anxious features   -Zyprexa 5 mg for psychosis/paranoia   -Encourage pt to attend groups and adhere to medications   -Attempt to gather further detailed history, establish rapport with pt   -Safety planning prior to discharge   -Ativan PRN 2 mg for anxiety/panic

## 2022-07-27 NOTE — BH INPATIENT PSYCHIATRY PROGRESS NOTE - NSBHCHARTREVIEWVS_PSY_A_CORE FT
Vital Signs Last 24 Hrs  T(C): 36.6 (07-27-22 @ 09:35), Max: 36.7 (07-26-22 @ 17:34)  T(F): 97.8 (07-27-22 @ 09:35), Max: 98.1 (07-26-22 @ 17:34)  HR: 103 (07-27-22 @ 09:35) (88 - 103)  BP: 112/71 (07-27-22 @ 09:35) (108/79 - 112/71)  BP(mean): --  RR: 18 (07-27-22 @ 09:35) (18 - 18)  SpO2: 99% (07-27-22 @ 09:35) (99% - 99%)     Vital Signs Last 24 Hrs  T(C): --  T(F): --  HR: --  BP: --  BP(mean): --  RR: --  SpO2: --

## 2022-07-27 NOTE — BH INPATIENT PSYCHIATRY PROGRESS NOTE - NSBHFUPINTERVALHXFT_PSY_A_CORE
Pt seen this morning where she is sleeping but is easy to wake and agrees to fill out PCL-5 with writer. Pt noted to be highly anxious, sweating onto the paper, with trembling hands, looking all over the paper and her options in a disorganized fashion and hesitating frequently, taking 20 minutes to complete 3 questions. Pt states she feels dizzy and asks to rest which writer obliges. Later pt visited by writer where she is eating some fruit for lunch and states she has back and stomach pain. Pt fixates on this pain and asks for Effexor. When asked why she wants that particular medication pt states "because I threw it up the night you gave it to me and I saw that it was an  pill." Pt cannot describe in further detail this pill but says that "a nurse gave it to me and told me that I looked fat and depressed." Pt asks whether she believes she is pregnant despite negative UPT and pt says "not sure." With further elaboration she says "You don't understand, I've had so many abortions before so I know what its like." Pt suspicious that her current abdominal/back pain may be due to a recent  or pregnancy. Pt talks about traumas a little bit and cries, appears anxious, but ultimately says "I don't want to talk about that stuff" repeatedly. She repeats "I didn't know I was pregnant last time I was in the hospital and they gave me a shot in my leg" repeatedly but does not elaborate. She mentions struggling with an upcoming "family court" date but will not divulge further information. Pt informed of PRN medications for GI upset, pain, and anxiety.

## 2022-07-28 LAB
APPEARANCE UR: CLEAR — SIGNIFICANT CHANGE UP
BILIRUB UR-MCNC: NEGATIVE — SIGNIFICANT CHANGE UP
COLOR SPEC: YELLOW — SIGNIFICANT CHANGE UP
DIFF PNL FLD: NEGATIVE — SIGNIFICANT CHANGE UP
GLUCOSE UR QL: NEGATIVE — SIGNIFICANT CHANGE UP
KETONES UR-MCNC: NEGATIVE — SIGNIFICANT CHANGE UP
LEUKOCYTE ESTERASE UR-ACNC: NEGATIVE — SIGNIFICANT CHANGE UP
NITRITE UR-MCNC: NEGATIVE — SIGNIFICANT CHANGE UP
PH UR: 8 — SIGNIFICANT CHANGE UP (ref 5–8)
PROT UR-MCNC: NEGATIVE MG/DL — SIGNIFICANT CHANGE UP
SP GR SPEC: 1.01 — SIGNIFICANT CHANGE UP (ref 1–1.03)
UROBILINOGEN FLD QL: 0.2 E.U./DL — SIGNIFICANT CHANGE UP

## 2022-07-28 PROCEDURE — 99252 IP/OBS CONSLTJ NEW/EST SF 35: CPT | Mod: GC

## 2022-07-28 PROCEDURE — 99233 SBSQ HOSP IP/OBS HIGH 50: CPT

## 2022-07-28 RX ORDER — SENNA PLUS 8.6 MG/1
2 TABLET ORAL AT BEDTIME
Refills: 0 | Status: DISCONTINUED | OUTPATIENT
Start: 2022-07-28 | End: 2022-08-21

## 2022-07-28 RX ORDER — POLYETHYLENE GLYCOL 3350 17 G/17G
17 POWDER, FOR SOLUTION ORAL
Refills: 0 | Status: DISCONTINUED | OUTPATIENT
Start: 2022-07-28 | End: 2022-08-22

## 2022-07-28 RX ADMIN — OLANZAPINE 5 MILLIGRAM(S): 15 TABLET, FILM COATED ORAL at 21:25

## 2022-07-28 RX ADMIN — Medication 400 MILLIGRAM(S): at 17:31

## 2022-07-28 RX ADMIN — Medication 400 MILLIGRAM(S): at 19:27

## 2022-07-28 RX ADMIN — Medication 20 MILLIGRAM(S): at 10:31

## 2022-07-28 RX ADMIN — Medication 25 MILLIGRAM(S): at 11:35

## 2022-07-28 NOTE — BH INPATIENT PSYCHIATRY PROGRESS NOTE - NSBHFUPINTERVALCCFT_PSY_A_CORE
Treatment for ongoing depression, anxiety, and psychosis. Rule out PTSD.   'I just really want to go home'

## 2022-07-28 NOTE — BH INPATIENT PSYCHIATRY PROGRESS NOTE - PRN MEDS
MEDICATIONS  (PRN):  acetaminophen     Tablet .. 650 milliGRAM(s) Oral every 6 hours PRN Mild Pain (1-3)  aluminum hydroxide/magnesium hydroxide/simethicone Suspension 30 milliLiter(s) Oral every 4 hours PRN Dyspepsia  diphenhydrAMINE 50 milliGRAM(s) Oral every 6 hours PRN agitation  haloperidol     Tablet 5 milliGRAM(s) Oral every 6 hours PRN agitation  hydrOXYzine hydrochloride 25 milliGRAM(s) Oral every 6 hours PRN insomnia/anxiety  ibuprofen  Tablet. 200 milliGRAM(s) Oral every 6 hours PRN Moderate Pain (4 - 6)  ibuprofen  Tablet. 400 milliGRAM(s) Oral every 6 hours PRN headaches  LORazepam     Tablet 2 milliGRAM(s) Oral every 6 hours PRN agitation or anxiety/panic  magnesium hydroxide Suspension 30 milliLiter(s) Oral daily PRN Constipation. 2nd line  traZODone 50 milliGRAM(s) Oral at bedtime PRN insomnia

## 2022-07-28 NOTE — BH INPATIENT PSYCHIATRY PROGRESS NOTE - NSBHCHARTREVIEWVS_PSY_A_CORE FT
Vital Signs Last 24 Hrs  T(C): 36.9 (07-28-22 @ 08:33), Max: 36.9 (07-28-22 @ 08:33)  T(F): 98.4 (07-28-22 @ 08:33), Max: 98.4 (07-28-22 @ 08:33)  HR: 93 (07-28-22 @ 08:33) (89 - 93)  BP: 127/89 (07-28-22 @ 08:33) (127/89 - 130/81)  BP(mean): --  RR: 18 (07-28-22 @ 08:33) (18 - 18)  SpO2: 99% (07-28-22 @ 08:33) (99% - 100%)

## 2022-07-28 NOTE — CONSULT NOTE ADULT - SUBJECTIVE AND OBJECTIVE BOX
CONSULT NOTE - INTERNAL MEDICINE  *INCOMPLETE UNTIL DISCUSSED WITH MEDICINE ATTENDING*    JASPREET THOMAS  [id number]  22y  Female    Patient is a 22y old with past medical Hx of asthma, who was admitted to psychiatry i/s/o depression, anxiety, and psychosis.  Medicine consulted for abdominal pain.      PAST MEDICAL/SURGICAL HISTORY  PAST MEDICAL & SURGICAL HISTORY:  Asthma      No significant past surgical history          REVIEW OF SYSTEMS:  CONSTITUTIONAL: No fever, weight loss, or fatigue  EYES: No eye pain, visual disturbances, or discharge  ENMT:  No difficulty hearing, tinnitus, vertigo; No sinus or throat pain  NECK: No pain or stiffness  BREASTS: No pain, masses, or nipple discharge  RESPIRATORY: No cough, wheezing, chills or hemoptysis; No shortness of breath  CARDIOVASCULAR: No chest pain, palpitations, dizziness, or leg swelling  GASTROINTESTINAL: No abdominal or epigastric pain. No nausea, vomiting, or hematemesis; No diarrhea or constipation. No melena or hematochezia.  GENITOURINARY: No dysuria, frequency, hematuria, or incontinence  NEUROLOGICAL: No headaches, memory loss, loss of strength, numbness, or tremors  SKIN: No itching, burning, rashes, or lesions   LYMPH NODES: No enlarged glands  ENDOCRINE: No heat or cold intolerance; No hair loss  MUSCULOSKELETAL: No joint pain or swelling; No muscle, back, or extremity pain  PSYCHIATRIC: No depression, anxiety, mood swings, or difficulty sleeping  HEME/LYMPH: No easy bruising, or bleeding gums  ALLERY AND IMMUNOLOGIC: No hives or eczema    T(C): 36.5 (07-27-22 @ 17:31), Max: 36.6 (07-27-22 @ 09:35)  HR: 89 (07-27-22 @ 17:31) (89 - 103)  BP: 130/81 (07-27-22 @ 17:31) (112/71 - 130/81)  RR: 18 (07-27-22 @ 17:31) (18 - 18)  SpO2: 100% (07-27-22 @ 17:31) (99% - 100%)  Wt(kg): --Vital Signs Last 24 Hrs  T(C): 36.5 (27 Jul 2022 17:31), Max: 36.6 (27 Jul 2022 09:35)  T(F): 97.7 (27 Jul 2022 17:31), Max: 97.8 (27 Jul 2022 09:35)  HR: 89 (27 Jul 2022 17:31) (89 - 103)  BP: 130/81 (27 Jul 2022 17:31) (112/71 - 130/81)  BP(mean): --  RR: 18 (27 Jul 2022 17:31) (18 - 18)  SpO2: 100% (27 Jul 2022 17:31) (99% - 100%)    Parameters below as of 27 Jul 2022 09:35  Patient On (Oxygen Delivery Method): room air        PHYSICAL EXAM:  GENERAL: NAD, well-groomed, well-developed  HEAD:  Atraumatic, Normocephalic  EYES: EOMI, PERRLA, conjunctiva and sclera clear  ENMT: No tonsillar erythema, exudates, or enlargement; Moist mucous membranes, Good dentition, No lesions  NECK: Supple, No JVD, Normal thyroid  NERVOUS SYSTEM:  Alert & Oriented X3, Good concentration; Motor Strength 5/5 B/L upper and lower extremities; DTRs 2+ intact and symmetric  CHEST/LUNG: Clear to percussion bilaterally; No rales, rhonchi, wheezing, or rubs  HEART: Regular rate and rhythm; No murmurs, rubs, or gallops  ABDOMEN: Soft, Nontender, Nondistended; Bowel sounds present  EXTREMITIES:  2+ Peripheral Pulses, No clubbing, cyanosis, or edema  LYMPH: No lymphadenopathy noted  SKIN: No rashes or lesions    Consultant(s) Notes Reviewed:  [x ] YES  [ ] NO  Care Discussed with Consultants/Other Providers [ x] YES  [ ] NO    LABS:                      RADIOLOGY & ADDITIONAL TESTS:    Imaging Personally Reviewed:  [ ] YES  [ ] NO CONSULT NOTE - INTERNAL MEDICINE  *INCOMPLETE UNTIL DISCUSSED WITH MEDICINE ATTENDING*    JASPREET THOMAS  [id number]  22y  Female    Patient is a 22 year old with past medical Hx of asthma, who was admitted to psychiatry i/s/o depression, anxiety, and psychosis. Medicine consulted for abdominal pain. Patient has been experiencing vague abdominal discomfort located predominantly in the LLQ that has been ongoing for the past two days. Per patient, pain waxes and wanes. Patient endorses constipation, but no nausea, recent vomiting, diarrhea, melena, or hematochezia.     PAST MEDICAL & SURGICAL HISTORY:  Asthma    REVIEW OF SYSTEMS:  CONSTITUTIONAL: No fever, weight loss, or fatigue  EYES: No eye pain, visual disturbances, or discharge  ENMT:  No difficulty hearing, tinnitus, vertigo; No sinus or throat pain  NECK: No pain or stiffness  BREASTS: No pain, masses, or nipple discharge  RESPIRATORY: No cough, wheezing, chills or hemoptysis; No shortness of breath  CARDIOVASCULAR: No chest pain, palpitations, dizziness, or leg swelling  GENITOURINARY: No dysuria, frequency, hematuria, or incontinence  NEUROLOGICAL: No headaches, memory loss, loss of strength, numbness, or tremors  SKIN: No itching, burning, rashes, or lesions   LYMPH NODES: No enlarged glands  ENDOCRINE: No heat or cold intolerance; No hair loss  MUSCULOSKELETAL: No joint pain or swelling; No muscle, back, or extremity pain  PSYCHIATRIC: No depression, anxiety, mood swings, or difficulty sleeping  HEME/LYMPH: No easy bruising, or bleeding gums  ALLERY AND IMMUNOLOGIC: No hives or eczema    T(C): 36.5 (07-27-22 @ 17:31), Max: 36.6 (07-27-22 @ 09:35)  HR: 89 (07-27-22 @ 17:31) (89 - 103)  BP: 130/81 (07-27-22 @ 17:31) (112/71 - 130/81)  RR: 18 (07-27-22 @ 17:31) (18 - 18)  SpO2: 100% (07-27-22 @ 17:31) (99% - 100%)  Wt(kg): --Vital Signs Last 24 Hrs  T(C): 36.5 (27 Jul 2022 17:31), Max: 36.6 (27 Jul 2022 09:35)  T(F): 97.7 (27 Jul 2022 17:31), Max: 97.8 (27 Jul 2022 09:35)  HR: 89 (27 Jul 2022 17:31) (89 - 103)  BP: 130/81 (27 Jul 2022 17:31) (112/71 - 130/81)  BP(mean): --  RR: 18 (27 Jul 2022 17:31) (18 - 18)  SpO2: 100% (27 Jul 2022 17:31) (99% - 100%)    Parameters below as of 27 Jul 2022 09:35  Patient On (Oxygen Delivery Method): room air        PHYSICAL EXAM:  GENERAL: NAD, well-groomed, well-developed  HEAD:  Atraumatic, Normocephalic  EYES: EOMI, PERRLA, conjunctiva and sclera clear  ENMT: No tonsillar erythema, exudates, or enlargement; Moist mucous membranes, Good dentition, No lesions  NECK: Supple, No JVD, Normal thyroid  NERVOUS SYSTEM:  Alert & Oriented X3, Good concentration; Motor Strength 5/5 B/L upper and lower extremities; DTRs 2+ intact and symmetric  CHEST/LUNG: Clear to percussion bilaterally; No rales, rhonchi, wheezing, or rubs  HEART: Regular rate and rhythm; No murmurs, rubs, or gallops  ABDOMEN: Soft, mild tenderness to palpation LLQ, Nondistended; Bowel sounds present  EXTREMITIES:  2+ Peripheral Pulses, No clubbing, cyanosis, or edema  LYMPH: No lymphadenopathy noted  SKIN: No rashes or lesions    Consultant(s) Notes Reviewed:  [x ] YES  [ ] NO  Care Discussed with Consultants/Other Providers [ x] YES  [ ] NO    RADIOLOGY & ADDITIONAL TESTS:    Imaging Personally Reviewed:  [x] YES  [ ] NO
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
## 2022-07-28 NOTE — BH INPATIENT PSYCHIATRY PROGRESS NOTE - CURRENT MEDICATION
MEDICATIONS  (STANDING):  FLUoxetine Solution 20 milliGRAM(s) Oral daily  OLANZapine Disintegrating Tablet 5 milliGRAM(s) Oral at bedtime  polyethylene glycol 3350 17 Gram(s) Oral two times a day  senna 2 Tablet(s) Oral at bedtime    MEDICATIONS  (PRN):  acetaminophen     Tablet .. 650 milliGRAM(s) Oral every 6 hours PRN Mild Pain (1-3)  aluminum hydroxide/magnesium hydroxide/simethicone Suspension 30 milliLiter(s) Oral every 4 hours PRN Dyspepsia  diphenhydrAMINE 50 milliGRAM(s) Oral every 6 hours PRN agitation  haloperidol     Tablet 5 milliGRAM(s) Oral every 6 hours PRN agitation  hydrOXYzine hydrochloride 25 milliGRAM(s) Oral every 6 hours PRN insomnia/anxiety  ibuprofen  Tablet. 200 milliGRAM(s) Oral every 6 hours PRN Moderate Pain (4 - 6)  ibuprofen  Tablet. 400 milliGRAM(s) Oral every 6 hours PRN headaches  LORazepam     Tablet 2 milliGRAM(s) Oral every 6 hours PRN agitation or anxiety/panic  magnesium hydroxide Suspension 30 milliLiter(s) Oral daily PRN Constipation. 2nd line  traZODone 50 milliGRAM(s) Oral at bedtime PRN insomnia

## 2022-07-28 NOTE — PROGRESS NOTE ADULT - SUBJECTIVE AND OBJECTIVE BOX
OVERNIGHT EVENTS:    SUBJECTIVE / INTERVAL HPI: Patient seen and examined at bedside.     VITAL SIGNS:  Vital Signs Last 24 Hrs  T(C): 36.9 (28 Jul 2022 08:33), Max: 36.9 (28 Jul 2022 08:33)  T(F): 98.4 (28 Jul 2022 08:33), Max: 98.4 (28 Jul 2022 08:33)  HR: 93 (28 Jul 2022 08:33) (89 - 93)  BP: 127/89 (28 Jul 2022 08:33) (127/89 - 130/81)  BP(mean): --  RR: 18 (28 Jul 2022 08:33) (18 - 18)  SpO2: 99% (28 Jul 2022 08:33) (99% - 100%)    Parameters below as of 28 Jul 2022 08:33  Patient On (Oxygen Delivery Method): room air        PHYSICAL EXAM:    General: WDWN  HEENT: NCAT; PERRL, anicteric sclera; MMM  Neck: supple, trachea midline  Cardiovascular: S1, S2 normal; RRR, no M/G/R  Respiratory: CTABL; no W/R/R  Gastrointestinal: soft, nontender, nondistended. bowel sounds present.  Skin: no ulcerations or visible rashes appreciated  Extremities: WWP; no edema, clubbing or cyanosis  Vascular: 2+ radial, DP/PT pulses B/L  Neurological: AAOx3; CN II-XII grossly intact; no focal deficits    MEDICATIONS:  MEDICATIONS  (STANDING):  FLUoxetine Solution 20 milliGRAM(s) Oral daily  OLANZapine Disintegrating Tablet 5 milliGRAM(s) Oral at bedtime  polyethylene glycol 3350 17 Gram(s) Oral two times a day  senna 2 Tablet(s) Oral at bedtime    MEDICATIONS  (PRN):  acetaminophen     Tablet .. 650 milliGRAM(s) Oral every 6 hours PRN Mild Pain (1-3)  aluminum hydroxide/magnesium hydroxide/simethicone Suspension 30 milliLiter(s) Oral every 4 hours PRN Dyspepsia  diphenhydrAMINE 50 milliGRAM(s) Oral every 6 hours PRN agitation  haloperidol     Tablet 5 milliGRAM(s) Oral every 6 hours PRN agitation  hydrOXYzine hydrochloride 25 milliGRAM(s) Oral every 6 hours PRN insomnia/anxiety  ibuprofen  Tablet. 200 milliGRAM(s) Oral every 6 hours PRN Moderate Pain (4 - 6)  ibuprofen  Tablet. 400 milliGRAM(s) Oral every 6 hours PRN headaches  LORazepam     Tablet 2 milliGRAM(s) Oral every 6 hours PRN agitation or anxiety/panic  magnesium hydroxide Suspension 30 milliLiter(s) Oral daily PRN Constipation. 2nd line  traZODone 50 milliGRAM(s) Oral at bedtime PRN insomnia      ALLERGIES:  Allergies    No Known Allergies    Intolerances        LABS:              CAPILLARY BLOOD GLUCOSE          RADIOLOGY & ADDITIONAL TESTS: Reviewed. SUBJECTIVE / INTERVAL HPI: Patient seen and examined at bedside.  continues to endorse abdominal pain.  states it feels sharp and goes to her back but unable to identify exactly which part of her stomach hurts the most.  she denies any n/v or diarrhea.  did not answer social history questions.     VITAL SIGNS:  Vital Signs Last 24 Hrs  T(C): 36.9 (28 Jul 2022 08:33), Max: 36.9 (28 Jul 2022 08:33)  T(F): 98.4 (28 Jul 2022 08:33), Max: 98.4 (28 Jul 2022 08:33)  HR: 93 (28 Jul 2022 08:33) (89 - 93)  BP: 127/89 (28 Jul 2022 08:33) (127/89 - 130/81)  BP(mean): --  RR: 18 (28 Jul 2022 08:33) (18 - 18)  SpO2: 99% (28 Jul 2022 08:33) (99% - 100%)    Parameters below as of 28 Jul 2022 08:33  Patient On (Oxygen Delivery Method): room air        PHYSICAL EXAM:    General: WDWN, NAD, flat affect  HEENT: NCAT; PERRL, anicteric sclera; MMM  Neck: supple  Cardiovascular: S1, S2 normal; RRR, no M/G/R  Respiratory: CTABL; no W/R/R  Gastrointestinal: soft, nontender, nondistended.  negative alvarez's sign.  bowel sounds present.  no rebound tenderness.  Skin: no ulcerations or visible rashes appreciated  Extremities: no edema  Neurological: alert, no focal deficits, moves all extremities    MEDICATIONS:  MEDICATIONS  (STANDING):  FLUoxetine Solution 20 milliGRAM(s) Oral daily  OLANZapine Disintegrating Tablet 5 milliGRAM(s) Oral at bedtime  polyethylene glycol 3350 17 Gram(s) Oral two times a day  senna 2 Tablet(s) Oral at bedtime    MEDICATIONS  (PRN):  acetaminophen     Tablet .. 650 milliGRAM(s) Oral every 6 hours PRN Mild Pain (1-3)  aluminum hydroxide/magnesium hydroxide/simethicone Suspension 30 milliLiter(s) Oral every 4 hours PRN Dyspepsia  diphenhydrAMINE 50 milliGRAM(s) Oral every 6 hours PRN agitation  haloperidol     Tablet 5 milliGRAM(s) Oral every 6 hours PRN agitation  hydrOXYzine hydrochloride 25 milliGRAM(s) Oral every 6 hours PRN insomnia/anxiety  ibuprofen  Tablet. 200 milliGRAM(s) Oral every 6 hours PRN Moderate Pain (4 - 6)  ibuprofen  Tablet. 400 milliGRAM(s) Oral every 6 hours PRN headaches  LORazepam     Tablet 2 milliGRAM(s) Oral every 6 hours PRN agitation or anxiety/panic  magnesium hydroxide Suspension 30 milliLiter(s) Oral daily PRN Constipation. 2nd line  traZODone 50 milliGRAM(s) Oral at bedtime PRN insomnia      ALLERGIES:  Allergies    No Known Allergies    Intolerances        LABS:              CAPILLARY BLOOD GLUCOSE          RADIOLOGY & ADDITIONAL TESTS: Reviewed.

## 2022-07-28 NOTE — BH INPATIENT PSYCHIATRY PROGRESS NOTE - NSBHMSEHYG_PSY_A_CORE
Health Maintenance Due   Topic Date Due   • Influenza Vaccine (1) 09/01/2019   • Depression Screening  11/01/2019       Patient is due for topics as listed above but is not proceeding with Immunization(s) Influenza at this time. Declines flu shot    Had pap Jan 2019 at [facility]. Has upcoming appt this Jan 2020         Good

## 2022-07-28 NOTE — BH INPATIENT PSYCHIATRY PROGRESS NOTE - NSBHASSESSSUMMFT_PSY_ALL_CORE
Sharona Kumar is a 21 yo female with PMH asthma and PPH MDD, ODD, and cannabis use disorder who was bib mother to [facility] for concerns that pt was not eating, drinking, or acting like herself. Patient seen today where she demonstrates findings of subjective and objectively depressed affect, anhedonia, poor eating, sleeping, concentration, energy, and passive SI over a few weeks' duration. Patient also has some psychotic features including potential internal preoccupation and reports of AH. Findings consistent with major depressive episode with psychotic features. Patient considered to be of potential risk to herself due to depression and suicidality as well as unable to care for herself as she is not tending to her basic needs, requiring admission and treatment.     7/20: Pt more open today, speaks about intimate topics with writer, voices concerns of pregnancy. Denies depressed state and rejects medications but eventually agrees to keep adhering to meds with much encouragement. PO intake increasing. Pt denies SI.     7/25: Pt suffering panic attacks on the unit. Childlike behaviors noted, pt remains anxious and appears depressed. Ativan PRN given with much resistance from the patient.     7/26: Pt calmer, still appearing anxious and depressed. Reports paranoia, opens up more about recent stressors and traumas. Agreed to continue Prozac and started Zyprexa last night with no AE reported. Eating and drinking slightly more.     7/27: Pt anxious, tearful, cannot participate in PCL 5. Disorganized/illogical with persecutory and somatic delusional content.     7/28 Patient noted to be very tearful, difficult to engage in meaningful discussion outside of topic of discharge, had bm yesterday. Orders for constipation in place. Medicine consult continues to follow. Tolerating increase in zyprexa without issue    Plan:  #MDE with psychotic features  -Prozac 20 mg for depressive and anxious features   -Zyprexa 5 mg for psychosis/paranoia   -Encourage pt to attend groups and adhere to medications   -Attempt to gather further detailed history, establish rapport with pt   -Safety planning prior to discharge   -Ativan PRN 2 mg for anxiety/panic

## 2022-07-28 NOTE — BH INPATIENT PSYCHIATRY PROGRESS NOTE - NSBHMETABOLIC_PSY_ALL_CORE_FT
BMI: BMI (kg/m2): 22.9 (07-17-22 @ 22:37)  HbA1c: A1C with Estimated Average Glucose Result: 4.7 % (07-18-22 @ 08:41)    Glucose:   BP: 127/89 (07-28-22 @ 08:33) (108/79 - 130/81)  Lipid Panel: Date/Time: 07-18-22 @ 08:41  Cholesterol, Serum: 147  Direct LDL: --  HDL Cholesterol, Serum: 47  Total Cholesterol/HDL Ration Measurement: --  Triglycerides, Serum: 57

## 2022-07-28 NOTE — CONSULT NOTE ADULT - ASSESSMENT
Patient is a 22 year old with past medical Hx of asthma, who was admitted to psychiatry i/s/o depression, anxiety, and psychosis.    Medicine consulted for abdominal pain.    #Abdominal pain  Patient presenting with vague abdominal discomfort in the LLQ that has been going on for the past two days. Vitals signs are stable. Bowel sounds in all four quadrants. No abdominal rigidity, rebound tenderness. Mild tenderness to palpation in LLQ. Abdominal X ray shows normal bowel gas pattern per wet read. Pain possibly 2/2 constipation i/s/o antidepressant and antipsychotic use. Recommendations are as follows:  - c/w Bowel Regimen, including Miralax daily and Senna 2 tablets QHS  - repeat CBC, CMP, Mg, P, and lipase  - collect UA with reflex Cx  - order EKG    NOTE AND RECOMMENDATIONS NOT FINALIZED UNTIL REVIEWED AND SIGNED BY INTERNAL MEDICINE ATTENDING.

## 2022-07-29 LAB
ALBUMIN SERPL ELPH-MCNC: 5 G/DL — SIGNIFICANT CHANGE UP (ref 3.3–5)
ALP SERPL-CCNC: 87 U/L — SIGNIFICANT CHANGE UP (ref 40–120)
ALT FLD-CCNC: 22 U/L — SIGNIFICANT CHANGE UP (ref 10–45)
ANION GAP SERPL CALC-SCNC: 12 MMOL/L — SIGNIFICANT CHANGE UP (ref 5–17)
AST SERPL-CCNC: 22 U/L — SIGNIFICANT CHANGE UP (ref 10–40)
BILIRUB SERPL-MCNC: 0.6 MG/DL — SIGNIFICANT CHANGE UP (ref 0.2–1.2)
BUN SERPL-MCNC: 6 MG/DL — LOW (ref 7–23)
CALCIUM SERPL-MCNC: 9.5 MG/DL — SIGNIFICANT CHANGE UP (ref 8.4–10.5)
CHLORIDE SERPL-SCNC: 102 MMOL/L — SIGNIFICANT CHANGE UP (ref 96–108)
CO2 SERPL-SCNC: 25 MMOL/L — SIGNIFICANT CHANGE UP (ref 22–31)
CREAT SERPL-MCNC: 0.71 MG/DL — SIGNIFICANT CHANGE UP (ref 0.5–1.3)
EGFR: 125 ML/MIN/1.73M2 — SIGNIFICANT CHANGE UP
GLUCOSE SERPL-MCNC: 103 MG/DL — HIGH (ref 70–99)
HCT VFR BLD CALC: 46.3 % — HIGH (ref 34.5–45)
HGB BLD-MCNC: 15.5 G/DL — SIGNIFICANT CHANGE UP (ref 11.5–15.5)
LIDOCAIN IGE QN: 40 U/L — SIGNIFICANT CHANGE UP (ref 7–60)
MAGNESIUM SERPL-MCNC: 1.9 MG/DL — SIGNIFICANT CHANGE UP (ref 1.6–2.6)
MCHC RBC-ENTMCNC: 31.2 PG — SIGNIFICANT CHANGE UP (ref 27–34)
MCHC RBC-ENTMCNC: 33.5 GM/DL — SIGNIFICANT CHANGE UP (ref 32–36)
MCV RBC AUTO: 93.2 FL — SIGNIFICANT CHANGE UP (ref 80–100)
NRBC # BLD: 0 /100 WBCS — SIGNIFICANT CHANGE UP (ref 0–0)
PHOSPHATE SERPL-MCNC: 3.8 MG/DL — SIGNIFICANT CHANGE UP (ref 2.5–4.5)
PLATELET # BLD AUTO: 241 K/UL — SIGNIFICANT CHANGE UP (ref 150–400)
POTASSIUM SERPL-MCNC: 3 MMOL/L — LOW (ref 3.5–5.3)
POTASSIUM SERPL-SCNC: 3 MMOL/L — LOW (ref 3.5–5.3)
PROT SERPL-MCNC: 7.7 G/DL — SIGNIFICANT CHANGE UP (ref 6–8.3)
RBC # BLD: 4.97 M/UL — SIGNIFICANT CHANGE UP (ref 3.8–5.2)
RBC # FLD: 11.9 % — SIGNIFICANT CHANGE UP (ref 10.3–14.5)
SODIUM SERPL-SCNC: 139 MMOL/L — SIGNIFICANT CHANGE UP (ref 135–145)
WBC # BLD: 9.07 K/UL — SIGNIFICANT CHANGE UP (ref 3.8–10.5)
WBC # FLD AUTO: 9.07 K/UL — SIGNIFICANT CHANGE UP (ref 3.8–10.5)

## 2022-07-29 PROCEDURE — 99232 SBSQ HOSP IP/OBS MODERATE 35: CPT

## 2022-07-29 PROCEDURE — 99232 SBSQ HOSP IP/OBS MODERATE 35: CPT | Mod: GC

## 2022-07-29 RX ADMIN — Medication 20 MILLIGRAM(S): at 11:56

## 2022-07-29 RX ADMIN — Medication 650 MILLIGRAM(S): at 12:47

## 2022-07-29 RX ADMIN — OLANZAPINE 5 MILLIGRAM(S): 15 TABLET, FILM COATED ORAL at 21:29

## 2022-07-29 RX ADMIN — Medication 650 MILLIGRAM(S): at 14:24

## 2022-07-29 NOTE — PROGRESS NOTE ADULT - ATTENDING COMMENTS
22-year-old female with a PMHx of asthma who presented to the psychiatry service with depression, anxiety and psychosis.  Medicine has been consulted for abdominal pain.   History was very difficult to obtain from the patient.  Etiology remains unclear given lack of subjective or objective information at this time.  Physical exam was benign without any signs of an acute abdominal process.   Pregnancy test was negative from 7/20/22    Recommendations  -f/u formal read of abdominal xray  -obtain CBC, CMP, Mg, Phos and lipase   -bowel regimen: miralx and senna  -can start simethicone PRN   -obtain UA/UCx and STD screen (gonorrhea, chlamydia)  -further recommendations based on above workup
22-year-old female with a PMHx of asthma who presented to the psychiatry service with depression, anxiety and psychosis.  Medicine has been consulted for abdominal pain.  Patient was very minimal in her responses to questions but pain likely related to indigestion/gas.  Patient refused labs yesterday.  Physical exam was benign without any signs of an acute abdominal process.   Pregnancy test, UA and abdominal xray negative.    Recommendations  -obtain CBC, CMP, Mg, Phos and lipase   -bowel regimen: miralx and senna  -can start simethicone PRN   -obtain STD screen (gonorrhea, chlamydia)    Thank you for the consult.  Medicine will sign off.  Please call back with further questions.

## 2022-07-29 NOTE — BH INPATIENT PSYCHIATRY PROGRESS NOTE - NSBHMETABOLIC_PSY_ALL_CORE_FT
BMI: BMI (kg/m2): 22.9 (07-17-22 @ 22:37)  HbA1c: A1C with Estimated Average Glucose Result: 4.7 % (07-18-22 @ 08:41)    Glucose:   BP: 119/82 (07-28-22 @ 16:31) (108/79 - 130/81)  Lipid Panel: Date/Time: 07-18-22 @ 08:41  Cholesterol, Serum: 147  Direct LDL: --  HDL Cholesterol, Serum: 47  Total Cholesterol/HDL Ration Measurement: --  Triglycerides, Serum: 57   BMI: BMI (kg/m2): 22.9 (07-17-22 @ 22:37)  HbA1c: A1C with Estimated Average Glucose Result: 4.7 % (07-18-22 @ 08:41)    Glucose:   BP: 107/72 (08-22-22 @ 09:00) (105/71 - 110/73)  Lipid Panel: Date/Time: 07-18-22 @ 08:41  Cholesterol, Serum: 147  Direct LDL: --  HDL Cholesterol, Serum: 47  Total Cholesterol/HDL Ration Measurement: --  Triglycerides, Serum: 57

## 2022-07-29 NOTE — BH INPATIENT PSYCHIATRY PROGRESS NOTE - NSBHMETABOLIC_PSY_ALL_CORE_FT
BMI: BMI (kg/m2): 22.9 (07-17-22 @ 22:37)  HbA1c: A1C with Estimated Average Glucose Result: 4.7 % (07-18-22 @ 08:41  BP: 114/80 (07-29-22 @ 09:00) (108/79 - 130/81)  Lipid Panel: Date/Time: 07-18-22 @ 08:41  Cholesterol, Serum: 147  HDL Cholesterol, Serum: 47  Triglycerides, Serum: 57

## 2022-07-29 NOTE — BH INPATIENT PSYCHIATRY PROGRESS NOTE - PRN MEDS
MEDICATIONS  (PRN):  acetaminophen     Tablet .. 650 milliGRAM(s) Oral every 6 hours PRN Mild Pain (1-3)  aluminum hydroxide/magnesium hydroxide/simethicone Suspension 30 milliLiter(s) Oral every 4 hours PRN Dyspepsia  diphenhydrAMINE 50 milliGRAM(s) Oral every 6 hours PRN agitation  haloperidol     Tablet 5 milliGRAM(s) Oral every 6 hours PRN agitation  hydrOXYzine hydrochloride 25 milliGRAM(s) Oral every 6 hours PRN insomnia/anxiety  ibuprofen  Tablet. 200 milliGRAM(s) Oral every 6 hours PRN Moderate Pain (4 - 6)  ibuprofen  Tablet. 400 milliGRAM(s) Oral every 6 hours PRN headaches  LORazepam     Tablet 2 milliGRAM(s) Oral every 6 hours PRN agitation or anxiety/panic  magnesium hydroxide Suspension 30 milliLiter(s) Oral daily PRN Constipation. 2nd line  traZODone 50 milliGRAM(s) Oral at bedtime PRN insomnia   MEDICATIONS  (PRN):

## 2022-07-29 NOTE — PROGRESS NOTE ADULT - SUBJECTIVE AND OBJECTIVE BOX
SUBJECTIVE / INTERVAL HPI: Patient seen and examined at bedside.  states that she no longer has abdominal pain.  tolerating food well.  has not had regular bowel movements for the past few days.  denies any urinary issues.    VITAL SIGNS:  Vital Signs Last 24 Hrs  T(C): 36.9 (2022 09:00), Max: 37.2 (2022 16:31)  T(F): 98.5 (2022 09:00), Max: 99 (2022 16:31)  HR: 86 (2022 09:00) (86 - 94)  BP: 114/80 (2022 09:00) (114/80 - 119/82)  BP(mean): --  RR: 18 (2022 09:00) (18 - 18)  SpO2: 99% (2022 09:00) (99% - 99%)        PHYSICAL EXAM:    General: WDWN, NAD, flat affect, eating breakfast  HEENT: NCAT; PERRL, anicteric sclera; MMM  Neck: supple  Cardiovascular: S1, S2 normal; RRR, no M/G/R  Respiratory: CTABL; no W/R/R  Gastrointestinal: soft, nontender, nondistended.  negative alvarez's sign.  bowel sounds present.  no rebound tenderness.  Skin: no ulcerations or visible rashes appreciated  Extremities: no edema  Neurological: alert, no focal deficits, moves all extremities    MEDICATIONS:  MEDICATIONS  (STANDING):  FLUoxetine Solution 20 milliGRAM(s) Oral daily  OLANZapine Disintegrating Tablet 5 milliGRAM(s) Oral at bedtime  polyethylene glycol 3350 17 Gram(s) Oral two times a day  senna 2 Tablet(s) Oral at bedtime    MEDICATIONS  (PRN):  acetaminophen     Tablet .. 650 milliGRAM(s) Oral every 6 hours PRN Mild Pain (1-3)  aluminum hydroxide/magnesium hydroxide/simethicone Suspension 30 milliLiter(s) Oral every 4 hours PRN Dyspepsia  diphenhydrAMINE 50 milliGRAM(s) Oral every 6 hours PRN agitation  haloperidol     Tablet 5 milliGRAM(s) Oral every 6 hours PRN agitation  hydrOXYzine hydrochloride 25 milliGRAM(s) Oral every 6 hours PRN insomnia/anxiety  ibuprofen  Tablet. 200 milliGRAM(s) Oral every 6 hours PRN Moderate Pain (4 - 6)  ibuprofen  Tablet. 400 milliGRAM(s) Oral every 6 hours PRN headaches  LORazepam     Tablet 2 milliGRAM(s) Oral every 6 hours PRN agitation or anxiety/panic  magnesium hydroxide Suspension 30 milliLiter(s) Oral daily PRN Constipation. 2nd line  traZODone 50 milliGRAM(s) Oral at bedtime PRN insomnia      ALLERGIES:  Allergies    No Known Allergies    Intolerances        LABS:            Urinalysis Basic - ( 2022 14:22 )    Color: Yellow / Appearance: Clear / S.010 / pH: x  Gluc: x / Ketone: NEGATIVE  / Bili: Negative / Urobili: 0.2 E.U./dL   Blood: x / Protein: NEGATIVE mg/dL / Nitrite: NEGATIVE   Leuk Esterase: NEGATIVE / RBC: x / WBC x   Sq Epi: x / Non Sq Epi: x / Bacteria: x      CAPILLARY BLOOD GLUCOSE          RADIOLOGY & ADDITIONAL TESTS: Reviewed.

## 2022-07-29 NOTE — BH INPATIENT PSYCHIATRY PROGRESS NOTE - NSBHFUPINTERVALHXFT_PSY_A_CORE
Pt seen today in the hallways where she appeared anxious and was scanning her environment. Later today pt approached where she is quiet and unresponsive, shaking/tremulous, nonverbal with writer. Pt still appears anxious and does not respond to writer. Later pt seen in art group participating in projects. Medicine notes reviewed, U/A returned as negative. Other labs ordered as requested by IM consult. Per RN staff pt had BM yesterday and is partially adherent to psychotropic and non psychotropic medications.

## 2022-07-29 NOTE — BH INPATIENT PSYCHIATRY PROGRESS NOTE - NSBHASSESSSUMMFT_PSY_ALL_CORE
Sharona Kumar is a 23 yo female with PMH asthma and PPH MDD, ODD, and cannabis use disorder who was bib mother to [facility] for concerns that pt was not eating, drinking, or acting like herself. Patient seen today where she demonstrates findings of subjective and objectively depressed affect, anhedonia, poor eating, sleeping, concentration, energy, and passive SI over a few weeks' duration. Patient also has some psychotic features including potential internal preoccupation and reports of AH. Findings consistent with major depressive episode with psychotic features. Patient considered to be of potential risk to herself due to depression and suicidality as well as unable to care for herself as she is not tending to her basic needs, requiring admission and treatment.     7/20: Pt more open today, speaks about intimate topics with writer, voices concerns of pregnancy. Denies depressed state and rejects medications but eventually agrees to keep adhering to meds with much encouragement. PO intake increasing. Pt denies SI.     7/25: Pt suffering panic attacks on the unit. Childlike behaviors noted, pt remains anxious and appears depressed. Ativan PRN given with much resistance from the patient.     7/26: Pt calmer, still appearing anxious and depressed. Reports paranoia, opens up more about recent stressors and traumas. Agreed to continue Prozac and started Zyprexa last night with no AE reported. Eating and drinking slightly more.     7/27: Pt anxious, tearful, cannot participate in PCL 5. Disorganized/illogical with persecutory and somatic delusional content.     7/28 Patient noted to be very tearful, difficult to engage in meaningful discussion outside of topic of discharge, had bm yesterday. Orders for constipation in place. Medicine consult continues to follow. Tolerating increase in zyprexa without issue    Plan:  #MDE with psychotic features  -Prozac 20 mg for depressive and anxious features   -Zyprexa 5 mg for psychosis/paranoia   -Encourage pt to attend groups and adhere to medications   -Attempt to gather further detailed history, establish rapport with pt   -Safety planning prior to discharge   -Ativan PRN 2 mg for anxiety/panic

## 2022-07-29 NOTE — BH INPATIENT PSYCHIATRY PROGRESS NOTE - NSBHFUPINTERVALHXFT_PSY_A_CORE
Continues to refuse meds. Considering treatment over objection. Will try to enlist mother in helping pt to take meds.

## 2022-07-29 NOTE — PROGRESS NOTE ADULT - ASSESSMENT
Patient is a 22 year old with past medical Hx of asthma, who was admitted to psychiatry i/s/o depression, anxiety, and psychosis.  Internal medicine was consulted for abdominal pain.    #Abdominal pain  Endorses sharp abdominal discomfort in the LLQ that radiates to her back.  Had one episode of vomiting a day ago but none reported since.  Denies any diarrhea.  Tolerating PO.  Poor historian and not answering questions about social history.  Abdominal exam benign w/o any TTP.  Urine pregnancy test negative.  DDx broad including constipation, UTI, PID.  - f/u abdominal xray  - obtain CBC, CMP, Mg, Phos, lipase  - standing miralax bid and senna qhs  - obtain UA and reflex culture  - obtain urine chlamydia and gonorrhea    Internal Medicine will continue to follow.  See attending attestation for final recommendations.
Patient is a 22 year old with past medical Hx of asthma, who was admitted to psychiatry i/s/o depression, anxiety, and psychosis.  Internal medicine was consulted for abdominal pain.    #Abdominal pain  Endorses sharp abdominal discomfort in the LLQ that radiates to her back.  Had one episode of vomiting a day ago but none reported since.  Denies any diarrhea.  Tolerating PO.  Poor historian and not answering questions about social history.  Abdominal exam benign w/o any TTP.  Urine pregnancy test negative.  DDx broad including constipation, UTI, PID.    - abd xray w/o any acute pathology, UA unremarkable  - obtain CBC, CMP, Mg, Phos, lipase  - standing miralax bid and senna qhs  - obtain urine chlamydia and gonorrhea    See attending attestation for final recommendations.  Internal Medicine will sign off.  Reconsult if any abnormalities noted on above labwork.

## 2022-07-29 NOTE — BH INPATIENT PSYCHIATRY PROGRESS NOTE - NSBHASSESSSUMMFT_PSY_ALL_CORE
Sharona Kumar is a 23 yo female with PMH asthma and PPH MDD, ODD, and cannabis use disorder who was bib mother to [facility] for concerns that pt was not eating, drinking, or acting like herself. Patient seen today where she demonstrates findings of subjective and objectively depressed affect, anhedonia, poor eating, sleeping, concentration, energy, and passive SI over a few weeks' duration. Patient also has some psychotic features including potential internal preoccupation and reports of AH. Findings consistent with major depressive episode with psychotic features. Patient considered to be of potential risk to herself due to depression and suicidality as well as unable to care for herself as she is not tending to her basic needs, requiring admission and treatment.     7/20: Pt more open today, speaks about intimate topics with writer, voices concerns of pregnancy. Denies depressed state and rejects medications but eventually agrees to keep adhering to meds with much encouragement. PO intake increasing. Pt denies SI.     7/25: Pt suffering panic attacks on the unit. Childlike behaviors noted, pt remains anxious and appears depressed. Ativan PRN given with much resistance from the patient.     7/26: Pt calmer, still appearing anxious and depressed. Reports paranoia, opens up more about recent stressors and traumas. Agreed to continue Prozac and started Zyprexa last night with no AE reported. Eating and drinking slightly more.     7/27: Pt anxious, tearful, cannot participate in PCL 5. Disorganized/illogical with persecutory and somatic delusional content.     7/28: Patient noted to be very tearful, difficult to engage in meaningful discussion outside of topic of discharge, had bm yesterday. Orders for constipation in place. Medicine consult continues to follow. Tolerating increase in zyprexa without issue.    7/29: Pt anxious and fearful appearing. Cannot verbalize concerns or converse with writer. Partially adherent to medications.     Plan:  #MDE with psychotic features  -Prozac 20 mg for depressive and anxious features   -Zydis 5 mg for psychosis/paranoia   -Encourage pt to attend groups and adhere to medications   -Attempt to gather further detailed history, establish rapport with pt   -Safety planning prior to discharge   -Ativan PRN 2 mg for anxiety/panic

## 2022-07-29 NOTE — BH INPATIENT PSYCHIATRY PROGRESS NOTE - NSBHCHARTREVIEWVS_PSY_A_CORE FT
Vital Signs Last 24 Hrs  T(C): 37.2 (07-28-22 @ 16:31), Max: 37.2 (07-28-22 @ 16:31)  T(F): 99 (07-28-22 @ 16:31), Max: 99 (07-28-22 @ 16:31)  HR: 94 (07-28-22 @ 16:31) (94 - 94)  BP: 119/82 (07-28-22 @ 16:31) (119/82 - 119/82)  BP(mean): --  RR: 18 (07-28-22 @ 16:31) (18 - 18)  SpO2: 99% (07-28-22 @ 16:31) (99% - 99%)     Vital Signs Last 24 Hrs  T(C): --  T(F): --  HR: --  BP: --  BP(mean): --  RR: --  SpO2: --

## 2022-07-29 NOTE — BH INPATIENT PSYCHIATRY PROGRESS NOTE - CURRENT MEDICATION
MEDICATIONS  (STANDING):  FLUoxetine Solution 20 milliGRAM(s) Oral daily  OLANZapine Disintegrating Tablet 5 milliGRAM(s) Oral at bedtime  polyethylene glycol 3350 17 Gram(s) Oral two times a day  senna 2 Tablet(s) Oral at bedtime    MEDICATIONS  (PRN):  acetaminophen     Tablet .. 650 milliGRAM(s) Oral every 6 hours PRN Mild Pain (1-3)  aluminum hydroxide/magnesium hydroxide/simethicone Suspension 30 milliLiter(s) Oral every 4 hours PRN Dyspepsia  diphenhydrAMINE 50 milliGRAM(s) Oral every 6 hours PRN agitation  haloperidol     Tablet 5 milliGRAM(s) Oral every 6 hours PRN agitation  hydrOXYzine hydrochloride 25 milliGRAM(s) Oral every 6 hours PRN insomnia/anxiety  ibuprofen  Tablet. 200 milliGRAM(s) Oral every 6 hours PRN Moderate Pain (4 - 6)  ibuprofen  Tablet. 400 milliGRAM(s) Oral every 6 hours PRN headaches  LORazepam     Tablet 2 milliGRAM(s) Oral every 6 hours PRN agitation or anxiety/panic  magnesium hydroxide Suspension 30 milliLiter(s) Oral daily PRN Constipation. 2nd line  traZODone 50 milliGRAM(s) Oral at bedtime PRN insomnia   MEDICATIONS  (STANDING):    MEDICATIONS  (PRN):

## 2022-07-29 NOTE — BH INPATIENT PSYCHIATRY PROGRESS NOTE - NSBHCHARTREVIEWVS_PSY_A_CORE FT
Vital Signs Last 24 Hrs  T(C): 36.9 (07-29-22 @ 09:00), Max: 37.2 (07-28-22 @ 16:31)  T(F): 98.5 (07-29-22 @ 09:00), Max: 99 (07-28-22 @ 16:31)  HR: 86 (07-29-22 @ 09:00) (86 - 94)  BP: 114/80 (07-29-22 @ 09:00) (114/80 - 119/82)  BP(mean): --  RR: 18 (07-29-22 @ 09:00) (18 - 18)  SpO2: 99% (07-29-22 @ 09:00) (99% - 99%)

## 2022-07-30 LAB
C TRACH RRNA SPEC QL NAA+PROBE: SIGNIFICANT CHANGE UP
N GONORRHOEA RRNA SPEC QL NAA+PROBE: SIGNIFICANT CHANGE UP

## 2022-07-30 RX ORDER — LACTULOSE 10 G/15ML
10 SOLUTION ORAL ONCE
Refills: 0 | Status: COMPLETED | OUTPATIENT
Start: 2022-07-30 | End: 2022-07-30

## 2022-07-30 RX ADMIN — Medication 20 MILLIGRAM(S): at 10:11

## 2022-07-30 RX ADMIN — LACTULOSE 10 GRAM(S): 10 SOLUTION ORAL at 23:45
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
## 2022-07-31 RX ADMIN — POLYETHYLENE GLYCOL 3350 17 GRAM(S): 17 POWDER, FOR SOLUTION ORAL at 15:45

## 2022-07-31 RX ADMIN — SENNA PLUS 2 TABLET(S): 8.6 TABLET ORAL at 21:27

## 2022-07-31 RX ADMIN — POLYETHYLENE GLYCOL 3350 17 GRAM(S): 17 POWDER, FOR SOLUTION ORAL at 21:27

## 2022-07-31 RX ADMIN — Medication 20 MILLIGRAM(S): at 11:05

## 2022-07-31 RX ADMIN — Medication 2 MILLIGRAM(S): at 14:01

## 2022-08-01 PROCEDURE — 99232 SBSQ HOSP IP/OBS MODERATE 35: CPT

## 2022-08-01 RX ADMIN — POLYETHYLENE GLYCOL 3350 17 GRAM(S): 17 POWDER, FOR SOLUTION ORAL at 11:16

## 2022-08-01 RX ADMIN — POLYETHYLENE GLYCOL 3350 17 GRAM(S): 17 POWDER, FOR SOLUTION ORAL at 21:37

## 2022-08-01 RX ADMIN — Medication 20 MILLIGRAM(S): at 11:13

## 2022-08-01 NOTE — BH INPATIENT PSYCHIATRY PROGRESS NOTE - NSBHFUPINTERVALHXFT_PSY_A_CORE
Pt seen today with her mother where she is childlike in her behaviors and cuddling her mom, attached to her and not letting go of her. Mother reports concerns that patient is "weak and fragile" and is worried about bringing her home. Conversation held with mom and pt as to whether they want discharge Wednesday with outpatient follow up or for her to stay longer with an increase in medication/changes made. Pt reports that she wants to go home several times. States she is eating, drinking water, and sleeping more. Still notably highly anxious on the unit. Pt present in halls and in groups but limited reactivity. Pt tells writer that she feels weak all over her body and is bobbing her head in an exaggerated manner. She is informed of her hypokalemia and pt remains somatically preoccupied that something else is wrong with her body despite reviewing consults/labs/imaging. No med changes made as of yet as mother is deciding on course of treatment.

## 2022-08-01 NOTE — BH INPATIENT PSYCHIATRY PROGRESS NOTE - NSBHASSESSSUMMFT_PSY_ALL_CORE
Sharona Kumar is a 23 yo female with PMH asthma and PPH MDD, ODD, and cannabis use disorder who was bib mother to [facility] for concerns that pt was not eating, drinking, or acting like herself. Patient seen today where she demonstrates findings of subjective and objectively depressed affect, anhedonia, poor eating, sleeping, concentration, energy, and passive SI over a few weeks' duration. Patient also has some psychotic features including potential internal preoccupation and reports of AH. Findings consistent with major depressive episode with psychotic features. Patient considered to be of potential risk to herself due to depression and suicidality as well as unable to care for herself as she is not tending to her basic needs, requiring admission and treatment.     7/20: Pt more open today, speaks about intimate topics with writer, voices concerns of pregnancy. Denies depressed state and rejects medications but eventually agrees to keep adhering to meds with much encouragement. PO intake increasing. Pt denies SI.     7/25: Pt suffering panic attacks on the unit. Childlike behaviors noted, pt remains anxious and appears depressed. Ativan PRN given with much resistance from the patient.     7/26: Pt calmer, still appearing anxious and depressed. Reports paranoia, opens up more about recent stressors and traumas. Agreed to continue Prozac and started Zyprexa last night with no AE reported. Eating and drinking slightly more.     7/27: Pt anxious, tearful, cannot participate in PCL 5. Disorganized/illogical with persecutory and somatic delusional content.     7/28: Patient noted to be very tearful, difficult to engage in meaningful discussion outside of topic of discharge, had bm yesterday. Orders for constipation in place. Medicine consult continues to follow. Tolerating increase in zyprexa without issue.    7/29: Pt anxious and fearful appearing. Cannot verbalize concerns or converse with writer. Partially adherent to medications.     8/1: Pt remains quite anxious. Poorly related. Paranoia, somatic preoccupations present. Team met with mother today, pending decision of further inpatient tx vs discharge.     Plan:  #MDE with psychotic features  -Prozac 20 mg for depressive and anxious features   -Zydis 5 mg for psychosis/paranoia   -Encourage pt to attend groups and adhere to medications   -Attempt to gather further detailed history, establish rapport with pt   -Safety planning prior to discharge   -Ativan PRN 2 mg for anxiety/panic

## 2022-08-01 NOTE — BH INPATIENT PSYCHIATRY PROGRESS NOTE - NSBHCHARTREVIEWVS_PSY_A_CORE FT
Vital Signs Last 24 Hrs  T(C): 37.2 (08-01-22 @ 17:10), Max: 37.2 (08-01-22 @ 17:10)  T(F): 98.9 (08-01-22 @ 17:10), Max: 98.9 (08-01-22 @ 17:10)  HR: 91 (08-01-22 @ 17:10) (91 - 128)  BP: 121/81 (08-01-22 @ 17:10) (115/77 - 121/81)  BP(mean): --  RR: 18 (08-01-22 @ 17:10) (18 - 18)  SpO2: 98% (08-01-22 @ 17:10) (97% - 98%)     Vital Signs Last 24 Hrs  T(C): --  T(F): --  HR: --  BP: --  BP(mean): --  RR: --  SpO2: --

## 2022-08-01 NOTE — BH INPATIENT PSYCHIATRY PROGRESS NOTE - NSBHMETABOLIC_PSY_ALL_CORE_FT
BMI: BMI (kg/m2): 22.9 (07-17-22 @ 22:37)  HbA1c: A1C with Estimated Average Glucose Result: 4.7 % (07-18-22 @ 08:41)    Glucose:   BP: 121/81 (08-01-22 @ 17:10) (98/64 - 122/83)  Lipid Panel: Date/Time: 07-18-22 @ 08:41  Cholesterol, Serum: 147  Direct LDL: --  HDL Cholesterol, Serum: 47  Total Cholesterol/HDL Ration Measurement: --  Triglycerides, Serum: 57   BMI: BMI (kg/m2): 22.9 (07-17-22 @ 22:37)  HbA1c: A1C with Estimated Average Glucose Result: 4.7 % (07-18-22 @ 08:41)    Glucose:   BP: 107/72 (08-22-22 @ 09:00) (105/71 - 110/73)  Lipid Panel: Date/Time: 07-18-22 @ 08:41  Cholesterol, Serum: 147  Direct LDL: --  HDL Cholesterol, Serum: 47  Total Cholesterol/HDL Ration Measurement: --  Triglycerides, Serum: 57

## 2022-08-02 LAB
ALBUMIN SERPL ELPH-MCNC: 3.9 G/DL — SIGNIFICANT CHANGE UP (ref 3.3–5)
ALP SERPL-CCNC: 78 U/L — SIGNIFICANT CHANGE UP (ref 40–120)
ALT FLD-CCNC: 29 U/L — SIGNIFICANT CHANGE UP (ref 10–45)
ANION GAP SERPL CALC-SCNC: 9 MMOL/L — SIGNIFICANT CHANGE UP (ref 5–17)
AST SERPL-CCNC: 48 U/L — HIGH (ref 10–40)
BILIRUB SERPL-MCNC: 0.4 MG/DL — SIGNIFICANT CHANGE UP (ref 0.2–1.2)
BUN SERPL-MCNC: 7 MG/DL — SIGNIFICANT CHANGE UP (ref 7–23)
CALCIUM SERPL-MCNC: 9.2 MG/DL — SIGNIFICANT CHANGE UP (ref 8.4–10.5)
CHLORIDE SERPL-SCNC: 107 MMOL/L — SIGNIFICANT CHANGE UP (ref 96–108)
CO2 SERPL-SCNC: 25 MMOL/L — SIGNIFICANT CHANGE UP (ref 22–31)
CREAT SERPL-MCNC: 0.69 MG/DL — SIGNIFICANT CHANGE UP (ref 0.5–1.3)
EGFR: 127 ML/MIN/1.73M2 — SIGNIFICANT CHANGE UP
GLUCOSE SERPL-MCNC: 100 MG/DL — HIGH (ref 70–99)
POTASSIUM SERPL-MCNC: 4.1 MMOL/L — SIGNIFICANT CHANGE UP (ref 3.5–5.3)
POTASSIUM SERPL-SCNC: 4.1 MMOL/L — SIGNIFICANT CHANGE UP (ref 3.5–5.3)
PROT SERPL-MCNC: 6.5 G/DL — SIGNIFICANT CHANGE UP (ref 6–8.3)
SODIUM SERPL-SCNC: 141 MMOL/L — SIGNIFICANT CHANGE UP (ref 135–145)

## 2022-08-02 PROCEDURE — 99232 SBSQ HOSP IP/OBS MODERATE 35: CPT

## 2022-08-02 NOTE — BH INPATIENT PSYCHIATRY PROGRESS NOTE - NSBHFUPINTERVALHXFT_PSY_A_CORE
Pt seen in her room where she repeats "I feel weak" and "I've been telling you guys this whole time" as well as "You're not understanding me." Pt continuously refers to stomach pain and states that she saw the x-rays so she knows there is something remaining in her stomach that she wants out. Pt refuses medications for potential relief and tells the writer "you guys are lying" and "you aren't taking me seriously" despite reassurance. Pt voices that she will not take medications as she does not believe that they are helping her. Pt informed of negative work up which she denies and is suspicious towards. Patient believes that she has something in her stomach because she has to eat or she feels weak; she states that she used to go several days without eating but now this is not the case because of the thing in her stomach.

## 2022-08-02 NOTE — BH INPATIENT PSYCHIATRY PROGRESS NOTE - NSBHMETABOLIC_PSY_ALL_CORE_FT
BMI: BMI (kg/m2): 22.9 (07-17-22 @ 22:37)  HbA1c: A1C with Estimated Average Glucose Result: 4.7 % (07-18-22 @ 08:41)    Glucose:   BP: 113/77 (08-02-22 @ 09:00) (113/77 - 122/83)  Lipid Panel: Date/Time: 07-18-22 @ 08:41  Cholesterol, Serum: 147  Direct LDL: --  HDL Cholesterol, Serum: 47  Total Cholesterol/HDL Ration Measurement: --  Triglycerides, Serum: 57   BMI: BMI (kg/m2): 22.9 (07-17-22 @ 22:37)  HbA1c: A1C with Estimated Average Glucose Result: 4.7 % (07-18-22 @ 08:41)    Glucose:   BP: 107/72 (08-22-22 @ 09:00) (105/71 - 110/73)  Lipid Panel: Date/Time: 07-18-22 @ 08:41  Cholesterol, Serum: 147  Direct LDL: --  HDL Cholesterol, Serum: 47  Total Cholesterol/HDL Ration Measurement: --  Triglycerides, Serum: 57
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
## 2022-08-02 NOTE — BH INPATIENT PSYCHIATRY PROGRESS NOTE - NSBHASSESSSUMMFT_PSY_ALL_CORE
Sharona Kumar is a 21 yo female with PMH asthma and PPH MDD, ODD, and cannabis use disorder who was bib mother to [facility] for concerns that pt was not eating, drinking, or acting like herself. Patient seen today where she demonstrates findings of subjective and objectively depressed affect, anhedonia, poor eating, sleeping, concentration, energy, and passive SI over a few weeks' duration. Patient also has some psychotic features including potential internal preoccupation and reports of AH. Findings consistent with major depressive episode with psychotic features. Patient considered to be of potential risk to herself due to depression and suicidality as well as unable to care for herself as she is not tending to her basic needs, requiring admission and treatment.     7/20: Pt more open today, speaks about intimate topics with writer, voices concerns of pregnancy. Denies depressed state and rejects medications but eventually agrees to keep adhering to meds with much encouragement. PO intake increasing. Pt denies SI.     7/25: Pt suffering panic attacks on the unit. Childlike behaviors noted, pt remains anxious and appears depressed. Ativan PRN given with much resistance from the patient.     7/26: Pt calmer, still appearing anxious and depressed. Reports paranoia, opens up more about recent stressors and traumas. Agreed to continue Prozac and started Zyprexa last night with no AE reported. Eating and drinking slightly more.     7/27: Pt anxious, tearful, cannot participate in PCL 5. Disorganized/illogical with persecutory and somatic delusional content.     7/28: Patient noted to be very tearful, difficult to engage in meaningful discussion outside of topic of discharge, had bm yesterday. Orders for constipation in place. Medicine consult continues to follow. Tolerating increase in zyprexa without issue.    7/29: Pt anxious and fearful appearing. Cannot verbalize concerns or converse with writer. Partially adherent to medications.     8/1: Pt remains quite anxious. Poorly related. Paranoia, somatic preoccupations present. Team met with mother today, pending decision of further inpatient tx vs discharge.     8/2: Pt anxious with paranoid ideation and somatic delusions present. Refuses to take medications. Work up returned normal.    Plan:  #MDE with psychotic features  -Prozac 20 mg for depressive and anxious features   -Zydis 5 mg for psychosis/paranoia, increase as needed   -Encourage pt to attend groups and adhere to medications   -Safety planning prior to discharge   -Ativan PRN 2 mg for anxiety/panic

## 2022-08-02 NOTE — BH TREATMENT PLAN - NSTXMEDICINTERRN_PSY_ALL_CORE
Patient denies constipation taking all other medications.
Patient denies constipation taking all other medications.

## 2022-08-02 NOTE — BH INPATIENT PSYCHIATRY PROGRESS NOTE - NSBHCHARTREVIEWVS_PSY_A_CORE FT
Vital Signs Last 24 Hrs  T(C): 37.1 (08-02-22 @ 09:00), Max: 37.2 (08-01-22 @ 17:10)  T(F): 98.8 (08-02-22 @ 09:00), Max: 98.9 (08-01-22 @ 17:10)  HR: 99 (08-02-22 @ 09:00) (86 - 99)  BP: 113/77 (08-02-22 @ 09:00) (113/77 - 121/81)  BP(mean): --  RR: 18 (08-02-22 @ 09:00) (18 - 18)  SpO2: 99% (08-02-22 @ 09:00) (98% - 100%)     Vital Signs Last 24 Hrs  T(C): --  T(F): --  HR: --  BP: --  BP(mean): --  RR: --  SpO2: --

## 2022-08-03 DIAGNOSIS — F33.3 MAJOR DEPRESSIVE DISORDER, RECURRENT, SEVERE WITH PSYCHOTIC SYMPTOMS: ICD-10-CM

## 2022-08-03 PROCEDURE — 99232 SBSQ HOSP IP/OBS MODERATE 35: CPT

## 2022-08-03 NOTE — BH INPATIENT PSYCHIATRY PROGRESS NOTE - NSBHMETABOLIC_PSY_ALL_CORE_FT
BMI: BMI (kg/m2): 22.9 (07-17-22 @ 22:37)  HbA1c: A1C with Estimated Average Glucose Result: 4.7 % (07-18-22 @ 08:41)    Glucose:   BP: 113/77 (08-02-22 @ 09:00) (113/77 - 121/81)  Lipid Panel: Date/Time: 07-18-22 @ 08:41  Cholesterol, Serum: 147  Direct LDL: --  HDL Cholesterol, Serum: 47  Total Cholesterol/HDL Ration Measurement: --  Triglycerides, Serum: 57

## 2022-08-03 NOTE — BH INPATIENT PSYCHIATRY PROGRESS NOTE - NSBHMETABOLIC_PSY_ALL_CORE_FT
BMI: BMI (kg/m2): 22.9 (07-17-22 @ 22:37)  HbA1c: A1C with Estimated Average Glucose Result: 4.7 % (07-18-22 @ 08:41)    Glucose:   BP: 113/77 (08-02-22 @ 09:00) (113/77 - 121/81)  Lipid Panel: Date/Time: 07-18-22 @ 08:41  Cholesterol, Serum: 147  Direct LDL: --  HDL Cholesterol, Serum: 47  Total Cholesterol/HDL Ration Measurement: --  Triglycerides, Serum: 57   BMI: BMI (kg/m2): 22.9 (07-17-22 @ 22:37)  HbA1c: A1C with Estimated Average Glucose Result: 4.7 % (07-18-22 @ 08:41)    Glucose:   BP: 115/81 (08-03-22 @ 16:55) (113/77 - 121/81)  Lipid Panel: Date/Time: 07-18-22 @ 08:41  Cholesterol, Serum: 147  Direct LDL: --  HDL Cholesterol, Serum: 47  Total Cholesterol/HDL Ration Measurement: --  Triglycerides, Serum: 57

## 2022-08-03 NOTE — BH INPATIENT PSYCHIATRY PROGRESS NOTE - NSBHFUPINTERVALHXFT_PSY_A_CORE
Pt seen today in room where she continues to fixate on somatic symptoms and pain in her stomach. Pt repeats phrases like "I told you a thousand times but you're not taking me seriously," "You're taking me as a joke," "I'm fine," and "You are not listening to me." Pt cannot elaborate further and has a lot of thought blocking and is guarded. Pt speaks once again about how she "saw the xrays" and repeats "I've had abortions before so I know what it feels like" but will not provide a connection between the two ideas although she brings them up together. Pt reports she wants "that thing taken out of my stomach." Pt upset and eating fruit, says she becomes weak if she doesn't eat and must continue eating. Pt has been nonadherent to medications. Explained TOO process today.

## 2022-08-03 NOTE — BH INPATIENT PSYCHIATRY PROGRESS NOTE - NSBHASSESSSUMMFT_PSY_ALL_CORE
Sharona Kumar is a 21 yo female with PMH asthma and PPH MDD, ODD, and cannabis use disorder who was bib mother to [facility] for concerns that pt was not eating, drinking, or acting like herself. Patient seen today where she demonstrates findings of subjective and objectively depressed affect, anhedonia, poor eating, sleeping, concentration, energy, and passive SI over a few weeks' duration. Patient also has some psychotic features including potential internal preoccupation and reports of AH. Findings consistent with major depressive episode with psychotic features. Patient considered to be of potential risk to herself due to depression and suicidality as well as unable to care for herself as she is not tending to her basic needs, requiring admission and treatment.     7/20: Pt more open today, speaks about intimate topics with writer, voices concerns of pregnancy. Denies depressed state and rejects medications but eventually agrees to keep adhering to meds with much encouragement. PO intake increasing. Pt denies SI.     7/25: Pt suffering panic attacks on the unit. Childlike behaviors noted, pt remains anxious and appears depressed. Ativan PRN given with much resistance from the patient.     7/26: Pt calmer, still appearing anxious and depressed. Reports paranoia, opens up more about recent stressors and traumas. Agreed to continue Prozac and started Zyprexa last night with no AE reported. Eating and drinking slightly more.     7/27: Pt anxious, tearful, cannot participate in PCL 5. Disorganized/illogical with persecutory and somatic delusional content.     7/28: Patient noted to be very tearful, difficult to engage in meaningful discussion outside of topic of discharge, had bm yesterday. Orders for constipation in place. Medicine consult continues to follow. Tolerating increase in zyprexa without issue.    7/29: Pt anxious and fearful appearing. Cannot verbalize concerns or converse with writer. Partially adherent to medications.     8/1: Pt remains quite anxious. Poorly related. Paranoia, somatic preoccupations present. Team met with mother today, pending decision of further inpatient tx vs discharge.     8/2: Pt anxious with paranoid ideation and somatic delusions present. Refuses to take medications. Work up returned normal.    Plan:  #MDE with psychotic features  -Prozac 20 mg for depressive and anxious features   -Zydis 5 mg for psychosis/paranoia, increase as needed   -Encourage pt to attend groups and adhere to medications   -Safety planning prior to discharge   -Ativan PRN 2 mg for anxiety/panic    Current medications acetaminophen     Tablet .. 650 milliGRAM(s) Oral every 6 hours PRN  aluminum hydroxide/magnesium hydroxide/simethicone Suspension 30 milliLiter(s) Oral every 4 hours PRN  diphenhydrAMINE 50 milliGRAM(s) Oral every 6 hours PRN  FLUoxetine Solution 20 milliGRAM(s) Oral daily  haloperidol     Tablet 5 milliGRAM(s) Oral every 6 hours PRN  hydrOXYzine hydrochloride 25 milliGRAM(s) Oral every 6 hours PRN  ibuprofen  Tablet. 200 milliGRAM(s) Oral every 6 hours PRN  ibuprofen  Tablet. 400 milliGRAM(s) Oral every 6 hours PRN  LORazepam     Tablet 2 milliGRAM(s) Oral every 6 hours PRN  magnesium hydroxide Suspension 30 milliLiter(s) Oral daily PRN  OLANZapine Disintegrating Tablet 5 milliGRAM(s) Oral at bedtime  polyethylene glycol 3350 17 Gram(s) Oral two times a day  senna 2 Tablet(s) Oral at bedtime  traZODone 50 milliGRAM(s) Oral at bedtime PRN

## 2022-08-03 NOTE — BH INPATIENT PSYCHIATRY PROGRESS NOTE - NSBHCHARTREVIEWVS_PSY_A_CORE FT
Vital Signs Last 24 Hrs  T(C): --  T(F): --  HR: --  BP: --  BP(mean): --  RR: --  SpO2: --     Vital Signs Last 24 Hrs  T(C): 36.6 (08-03-22 @ 16:55), Max: 36.6 (08-03-22 @ 16:55)  T(F): 97.8 (08-03-22 @ 16:55), Max: 97.8 (08-03-22 @ 16:55)  HR: 91 (08-03-22 @ 16:55) (91 - 91)  BP: 115/81 (08-03-22 @ 16:55) (115/81 - 115/81)  BP(mean): --  RR: 18 (08-03-22 @ 16:55) (18 - 18)  SpO2: 100% (08-03-22 @ 16:55) (100% - 100%)

## 2022-08-03 NOTE — BH INPATIENT PSYCHIATRY PROGRESS NOTE - NSBHASSESSSUMMFT_PSY_ALL_CORE
Sharona Kumar is a 21 yo female with PMH asthma and PPH MDD, ODD, and cannabis use disorder who was bib mother to [facility] for concerns that pt was not eating, drinking, or acting like herself. Patient seen today where she demonstrates findings of subjective and objectively depressed affect, anhedonia, poor eating, sleeping, concentration, energy, and passive SI over a few weeks' duration. Patient also has some psychotic features including potential internal preoccupation and reports of AH. Findings consistent with major depressive episode with psychotic features. Patient considered to be of potential risk to herself due to depression and suicidality as well as unable to care for herself as she is not tending to her basic needs, requiring admission and treatment.     7/20: Pt more open today, speaks about intimate topics with writer, voices concerns of pregnancy. Denies depressed state and rejects medications but eventually agrees to keep adhering to meds with much encouragement. PO intake increasing. Pt denies SI.     7/25: Pt suffering panic attacks on the unit. Childlike behaviors noted, pt remains anxious and appears depressed. Ativan PRN given with much resistance from the patient.     7/26: Pt calmer, still appearing anxious and depressed. Reports paranoia, opens up more about recent stressors and traumas. Agreed to continue Prozac and started Zyprexa last night with no AE reported. Eating and drinking slightly more.     7/27: Pt anxious, tearful, cannot participate in PCL 5. Disorganized/illogical with persecutory and somatic delusional content.     7/28: Patient noted to be very tearful, difficult to engage in meaningful discussion outside of topic of discharge, had bm yesterday. Orders for constipation in place. Medicine consult continues to follow. Tolerating increase in zyprexa without issue.    7/29: Pt anxious and fearful appearing. Cannot verbalize concerns or converse with writer. Partially adherent to medications.     8/1: Pt remains quite anxious. Poorly related. Paranoia, somatic preoccupations present. Team met with mother today, pending decision of further inpatient tx vs discharge.     8/2: Pt anxious with paranoid ideation and somatic delusions present. Refuses to take medications. Work up returned normal.    8/3: Patient distressed, predominating paranoia, somatically preoccupied. Refusing medications, informed of TOO process.     Plan:  #MDE with psychotic features  -Prozac 20 mg for depressive and anxious features   -Zydis 5 mg for psychosis/paranoia, increase as needed   -Encourage pt to attend groups and adhere to medications   -Safety planning prior to discharge   -Ativan PRN 2 mg for anxiety/panic    Current medications acetaminophen     Tablet .. 650 milliGRAM(s) Oral every 6 hours PRN  aluminum hydroxide/magnesium hydroxide/simethicone Suspension 30 milliLiter(s) Oral every 4 hours PRN  diphenhydrAMINE 50 milliGRAM(s) Oral every 6 hours PRN  FLUoxetine Solution 20 milliGRAM(s) Oral daily  haloperidol     Tablet 5 milliGRAM(s) Oral every 6 hours PRN  hydrOXYzine hydrochloride 25 milliGRAM(s) Oral every 6 hours PRN  ibuprofen  Tablet. 200 milliGRAM(s) Oral every 6 hours PRN  ibuprofen  Tablet. 400 milliGRAM(s) Oral every 6 hours PRN  LORazepam     Tablet 2 milliGRAM(s) Oral every 6 hours PRN  magnesium hydroxide Suspension 30 milliLiter(s) Oral daily PRN  OLANZapine Disintegrating Tablet 5 milliGRAM(s) Oral at bedtime  polyethylene glycol 3350 17 Gram(s) Oral two times a day  senna 2 Tablet(s) Oral at bedtime  traZODone 50 milliGRAM(s) Oral at bedtime PRN

## 2022-08-04 PROCEDURE — 99233 SBSQ HOSP IP/OBS HIGH 50: CPT

## 2022-08-04 RX ADMIN — HALOPERIDOL DECANOATE 5 MILLIGRAM(S): 100 INJECTION INTRAMUSCULAR at 14:09

## 2022-08-04 RX ADMIN — Medication 2 MILLIGRAM(S): at 14:09

## 2022-08-04 NOTE — BH INPATIENT PSYCHIATRY PROGRESS NOTE - NSBHASSESSSUMMFT_PSY_ALL_CORE
Sharona Kumar is a 23 yo female with PMH asthma and PPH MDD, ODD, and cannabis use disorder who was bib mother to [facility] for concerns that pt was not eating, drinking, or acting like herself. Patient seen today where she demonstrates findings of subjective and objectively depressed affect, anhedonia, poor eating, sleeping, concentration, energy, and passive SI over a few weeks' duration. Patient also has some psychotic features including potential internal preoccupation and reports of AH. Findings consistent with major depressive episode with psychotic features. Patient considered to be of potential risk to herself due to depression and suicidality as well as unable to care for herself as she is not tending to her basic needs, requiring admission and treatment.     7/20: Pt more open today, speaks about intimate topics with writer, voices concerns of pregnancy. Denies depressed state and rejects medications but eventually agrees to keep adhering to meds with much encouragement. PO intake increasing. Pt denies SI.     7/25: Pt suffering panic attacks on the unit. Childlike behaviors noted, pt remains anxious and appears depressed. Ativan PRN given with much resistance from the patient.     7/26: Pt calmer, still appearing anxious and depressed. Reports paranoia, opens up more about recent stressors and traumas. Agreed to continue Prozac and started Zyprexa last night with no AE reported. Eating and drinking slightly more.     7/27: Pt anxious, tearful, cannot participate in PCL 5. Disorganized/illogical with persecutory and somatic delusional content.     7/28: Patient noted to be very tearful, difficult to engage in meaningful discussion outside of topic of discharge, had bm yesterday. Orders for constipation in place. Medicine consult continues to follow. Tolerating increase in zyprexa without issue.    7/29: Pt anxious and fearful appearing. Cannot verbalize concerns or converse with writer. Partially adherent to medications.     8/1: Pt remains quite anxious. Poorly related. Paranoia, somatic preoccupations present. Team met with mother today, pending decision of further inpatient tx vs discharge.     8/2: Pt anxious with paranoid ideation and somatic delusions present. Refuses to take medications. Work up returned normal.    8/3: Patient distressed, predominating paranoia, somatically preoccupied. Refusing medications, informed of TOO process.     8/4 Appears psychotic, distressed and confused, seen giggling and smiling to self. Took prn po haldol 5mg/ativan 2mg, poor hygiene- malodorous    Plan:  #MDE with psychotic features  -Prozac 20 mg for depressive and anxious features   -Zydis 5 mg for psychosis/paranoia, increase as needed   -Encourage pt to attend groups and adhere to medications   -Safety planning prior to discharge   -Ativan PRN 2 mg for anxiety/panic    Current medications acetaminophen     Tablet .. 650 milliGRAM(s) Oral every 6 hours PRN  aluminum hydroxide/magnesium hydroxide/simethicone Suspension 30 milliLiter(s) Oral every 4 hours PRN  diphenhydrAMINE 50 milliGRAM(s) Oral every 6 hours PRN  FLUoxetine Solution 20 milliGRAM(s) Oral daily  haloperidol     Tablet 5 milliGRAM(s) Oral every 6 hours PRN  hydrOXYzine hydrochloride 25 milliGRAM(s) Oral every 6 hours PRN  ibuprofen  Tablet. 200 milliGRAM(s) Oral every 6 hours PRN  ibuprofen  Tablet. 400 milliGRAM(s) Oral every 6 hours PRN  LORazepam     Tablet 2 milliGRAM(s) Oral every 6 hours PRN  magnesium hydroxide Suspension 30 milliLiter(s) Oral daily PRN  OLANZapine Disintegrating Tablet 5 milliGRAM(s) Oral at bedtime  polyethylene glycol 3350 17 Gram(s) Oral two times a day  senna 2 Tablet(s) Oral at bedtime  traZODone 50 milliGRAM(s) Oral at bedtime PRN

## 2022-08-04 NOTE — BH INPATIENT PSYCHIATRY PROGRESS NOTE - NSBHMETABOLIC_PSY_ALL_CORE_FT
BMI: BMI (kg/m2): 22.9 (07-17-22 @ 22:37)  HbA1c: A1C with Estimated Average Glucose Result: 4.7 % (07-18-22 @ 08:41)    Glucose:   BP: 110/76 (08-04-22 @ 09:00) (110/76 - 121/81)  Lipid Panel: Date/Time: 07-18-22 @ 08:41  Cholesterol, Serum: 147  Direct LDL: --  HDL Cholesterol, Serum: 47  Total Cholesterol/HDL Ration Measurement: --  Triglycerides, Serum: 57

## 2022-08-04 NOTE — BH INPATIENT PSYCHIATRY PROGRESS NOTE - NSBHFUPINTERVALHXFT_PSY_A_CORE
Patient visible on the unit, seen slowly walking around the unit, taking very small steps, braiding her hair, laughing and giggling to self. Inaudible on approach, writer unable to make out any words that pt makes. She is malodorous, appears confused, highly distressed and very fearful, shaking, crying, she is psychotic and is responding to internal stimuli. MAR review indicates that pt has continued to refuse medications. With much encourage from team pt did ultimately take prn haldol 5mg/ativan 2mg, observed drinking numerous bottles of water. Recommend TOO to be pursued at this time.

## 2022-08-04 NOTE — BH INPATIENT PSYCHIATRY PROGRESS NOTE - NSBHCHARTREVIEWVS_PSY_A_CORE FT
Vital Signs Last 24 Hrs  T(C): 36.7 (08-04-22 @ 09:00), Max: 36.7 (08-04-22 @ 09:00)  T(F): 98.1 (08-04-22 @ 09:00), Max: 98.1 (08-04-22 @ 09:00)  HR: 89 (08-04-22 @ 09:00) (89 - 91)  BP: 110/76 (08-04-22 @ 09:00) (110/76 - 115/81)  BP(mean): --  RR: 18 (08-04-22 @ 09:00) (18 - 18)  SpO2: 99% (08-04-22 @ 09:00) (99% - 100%)

## 2022-08-05 PROCEDURE — 99232 SBSQ HOSP IP/OBS MODERATE 35: CPT

## 2022-08-05 RX ORDER — HALOPERIDOL DECANOATE 100 MG/ML
2 INJECTION INTRAMUSCULAR EVERY 12 HOURS
Refills: 0 | Status: DISCONTINUED | OUTPATIENT
Start: 2022-08-05 | End: 2022-08-16

## 2022-08-05 RX ADMIN — Medication 20 MILLIGRAM(S): at 11:34

## 2022-08-05 RX ADMIN — Medication 2 MILLIGRAM(S): at 11:16

## 2022-08-05 RX ADMIN — HALOPERIDOL DECANOATE 5 MILLIGRAM(S): 100 INJECTION INTRAMUSCULAR at 11:16

## 2022-08-05 RX ADMIN — Medication 30 MILLILITER(S): at 11:45

## 2022-08-05 NOTE — BH INPATIENT PSYCHIATRY PROGRESS NOTE - NSBHFUPINTERVALHXFT_PSY_A_CORE
Patient visible on the unit, seen slowly walking around the unit, taking very small steps, braiding her hair, laughing and giggling to self. Inaudible on approach, writer unable to make out any words that pt makes. She is malodorous, appears confused, highly distressed and very fearful, shaking, crying, she is psychotic and is responding to internal stimuli. MAR review indicates that pt has continued to refuse medications. With much encourage from team pt did ultimately take prn haldol 5mg/ativan 2mg, observed drinking numerous bottles of water. Recommend TOO to be pursued at this time.      Worsening depression and psychosis. Discussed how we would take her to court for treatment over objection if she continues to refuse meds. Pt taking meds very intermittently. Told her that if she took meds consistently for 5 days we could discuss discharge plans with mother more realistically.

## 2022-08-05 NOTE — BH INPATIENT PSYCHIATRY PROGRESS NOTE - NSBHASSESSSUMMFT_PSY_ALL_CORE
Sharona Kumar is a 21 yo female with PMH asthma and PPH MDD, ODD, and cannabis use disorder who was bib mother to [facility] for concerns that pt was not eating, drinking, or acting like herself. Patient seen today where she demonstrates findings of subjective and objectively depressed affect, anhedonia, poor eating, sleeping, concentration, energy, and passive SI over a few weeks' duration. Patient also has some psychotic features including potential internal preoccupation and reports of AH. Findings consistent with major depressive episode with psychotic features. Patient considered to be of potential risk to herself due to depression and suicidality as well as unable to care for herself as she is not tending to her basic needs, requiring admission and treatment.     7/20: Pt more open today, speaks about intimate topics with writer, voices concerns of pregnancy. Denies depressed state and rejects medications but eventually agrees to keep adhering to meds with much encouragement. PO intake increasing. Pt denies SI.     7/25: Pt suffering panic attacks on the unit. Childlike behaviors noted, pt remains anxious and appears depressed. Ativan PRN given with much resistance from the patient.     7/26: Pt calmer, still appearing anxious and depressed. Reports paranoia, opens up more about recent stressors and traumas. Agreed to continue Prozac and started Zyprexa last night with no AE reported. Eating and drinking slightly more.     7/27: Pt anxious, tearful, cannot participate in PCL 5. Disorganized/illogical with persecutory and somatic delusional content.     7/28: Patient noted to be very tearful, difficult to engage in meaningful discussion outside of topic of discharge, had bm yesterday. Orders for constipation in place. Medicine consult continues to follow. Tolerating increase in zyprexa without issue.    7/29: Pt anxious and fearful appearing. Cannot verbalize concerns or converse with writer. Partially adherent to medications.     8/1: Pt remains quite anxious. Poorly related. Paranoia, somatic preoccupations present. Team met with mother today, pending decision of further inpatient tx vs discharge.     8/2: Pt anxious with paranoid ideation and somatic delusions present. Refuses to take medications. Work up returned normal.    8/3: Patient distressed, predominating paranoia, somatically preoccupied. Refusing medications, informed of TOO process.     8/4 Appears psychotic, distressed and confused, seen giggling and smiling to self. Took prn po haldol 5mg/ativan 2mg, poor hygiene- malodorous    Plan:  #MDE with psychotic features  -Prozac 20 mg for depressive and anxious features   -Zydis 5 mg for psychosis/paranoia, increase as needed   -Encourage pt to attend groups and adhere to medications   -Safety planning prior to discharge   -Ativan PRN 2 mg for anxiety/panic    Current medications acetaminophen     Tablet .. 650 milliGRAM(s) Oral every 6 hours PRN  aluminum hydroxide/magnesium hydroxide/simethicone Suspension 30 milliLiter(s) Oral every 4 hours PRN  diphenhydrAMINE 50 milliGRAM(s) Oral every 6 hours PRN  FLUoxetine Solution 20 milliGRAM(s) Oral daily  haloperidol     Tablet 5 milliGRAM(s) Oral every 6 hours PRN  hydrOXYzine hydrochloride 25 milliGRAM(s) Oral every 6 hours PRN  ibuprofen  Tablet. 200 milliGRAM(s) Oral every 6 hours PRN  ibuprofen  Tablet. 400 milliGRAM(s) Oral every 6 hours PRN  LORazepam     Tablet 2 milliGRAM(s) Oral every 6 hours PRN  magnesium hydroxide Suspension 30 milliLiter(s) Oral daily PRN  OLANZapine Disintegrating Tablet 5 milliGRAM(s) Oral at bedtime  polyethylene glycol 3350 17 Gram(s) Oral two times a day  senna 2 Tablet(s) Oral at bedtime  traZODone 50 milliGRAM(s) Oral at bedtime PRN

## 2022-08-05 NOTE — BH INPATIENT PSYCHIATRY PROGRESS NOTE - NSBHMETABOLIC_PSY_ALL_CORE_FT
BMI: BMI (kg/m2): 22.9 (07-17-22 @ 22:37)  HbA1c: A1C with Estimated Average Glucose Result: 4.7 % (07-18-22 @ 08:41)    Glucose:   BP: 119/86 (08-05-22 @ 17:26) (102/64 - 119/86)  Lipid Panel: Date/Time: 07-18-22 @ 08:41  Cholesterol, Serum: 147  Direct LDL: --  HDL Cholesterol, Serum: 47  Total Cholesterol/HDL Ration Measurement: --  Triglycerides, Serum: 57   BMI: BMI (kg/m2): 22.9 (07-17-22 @ 22:37)  HbA1c: A1C with Estimated Average Glucose Result: 4.7 % (07-18-22 @ 08:41)    Glucose:   BP: 107/72 (08-22-22 @ 09:00) (105/71 - 110/73)  Lipid Panel: Date/Time: 07-18-22 @ 08:41  Cholesterol, Serum: 147  Direct LDL: --  HDL Cholesterol, Serum: 47  Total Cholesterol/HDL Ration Measurement: --  Triglycerides, Serum: 57

## 2022-08-05 NOTE — BH INPATIENT PSYCHIATRY PROGRESS NOTE - NSBHCHARTREVIEWVS_PSY_A_CORE FT
Vital Signs Last 24 Hrs  T(C): 36.9 (08-05-22 @ 09:05), Max: 36.9 (08-05-22 @ 09:05)  T(F): 98.5 (08-05-22 @ 09:05), Max: 98.5 (08-05-22 @ 09:05)  HR: 102 (08-05-22 @ 17:26) (102 - 120)  BP: 119/86 (08-05-22 @ 17:26) (111/78 - 119/86)  BP(mean): --  RR: 18 (08-05-22 @ 17:26) (18 - 18)  SpO2: 100% (08-05-22 @ 17:26) (98% - 100%)     Vital Signs Last 24 Hrs  T(C): --  T(F): --  HR: --  BP: --  BP(mean): --  RR: --  SpO2: --

## 2022-08-06 RX ADMIN — Medication 1 MILLIGRAM(S): at 11:20

## 2022-08-06 RX ADMIN — POLYETHYLENE GLYCOL 3350 17 GRAM(S): 17 POWDER, FOR SOLUTION ORAL at 21:16

## 2022-08-06 RX ADMIN — Medication 20 MILLIGRAM(S): at 11:19

## 2022-08-06 NOTE — BH PSYCHOLOGY - CLINICIAN PSYCHOTHERAPY NOTE - TOKEN PULL-DIAGNOSIS
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
## 2022-08-07 RX ADMIN — POLYETHYLENE GLYCOL 3350 17 GRAM(S): 17 POWDER, FOR SOLUTION ORAL at 21:33

## 2022-08-08 PROCEDURE — 99232 SBSQ HOSP IP/OBS MODERATE 35: CPT

## 2022-08-08 RX ADMIN — Medication 20 MILLIGRAM(S): at 10:52

## 2022-08-08 RX ADMIN — POLYETHYLENE GLYCOL 3350 17 GRAM(S): 17 POWDER, FOR SOLUTION ORAL at 10:53

## 2022-08-08 RX ADMIN — SENNA PLUS 2 TABLET(S): 8.6 TABLET ORAL at 21:29

## 2022-08-08 RX ADMIN — POLYETHYLENE GLYCOL 3350 17 GRAM(S): 17 POWDER, FOR SOLUTION ORAL at 21:32

## 2022-08-08 NOTE — BH INPATIENT PSYCHIATRY PROGRESS NOTE - NSBHCHARTREVIEWVS_PSY_A_CORE FT
Vital Signs Last 24 Hrs  T(C): 37 (08-08-22 @ 08:45), Max: 37 (08-08-22 @ 08:45)  T(F): 98.6 (08-08-22 @ 08:45), Max: 98.6 (08-08-22 @ 08:45)  HR: 98 (08-08-22 @ 08:45) (86 - 98)  BP: 111/76 (08-08-22 @ 08:45) (111/73 - 111/76)  BP(mean): --  RR: 18 (08-08-22 @ 08:45) (16 - 18)  SpO2: 98% (08-08-22 @ 08:45) (98% - 99%)

## 2022-08-08 NOTE — BH INPATIENT PSYCHIATRY PROGRESS NOTE - PRN MEDS
MEDICATIONS  (PRN):  acetaminophen     Tablet .. 650 milliGRAM(s) Oral every 6 hours PRN Mild Pain (1-3)  aluminum hydroxide/magnesium hydroxide/simethicone Suspension 30 milliLiter(s) Oral every 4 hours PRN Dyspepsia  diphenhydrAMINE 50 milliGRAM(s) Oral every 6 hours PRN agitation  haloperidol     Tablet 5 milliGRAM(s) Oral every 6 hours PRN agitation  hydrOXYzine hydrochloride 25 milliGRAM(s) Oral every 6 hours PRN insomnia/anxiety  ibuprofen  Tablet. 400 milliGRAM(s) Oral every 6 hours PRN headaches  ibuprofen  Tablet. 200 milliGRAM(s) Oral every 6 hours PRN Moderate Pain (4 - 6)  LORazepam     Tablet 2 milliGRAM(s) Oral every 6 hours PRN agitation or anxiety/panic  magnesium hydroxide Suspension 30 milliLiter(s) Oral daily PRN Constipation. 2nd line  traZODone 50 milliGRAM(s) Oral at bedtime PRN insomnia

## 2022-08-08 NOTE — BH INPATIENT PSYCHIATRY PROGRESS NOTE - NSBHMSESPEECH_PSY_A_CORE
Abnormal as indicated, otherwise normal... Psychosis, unspecified psychosis type Deferred condition on axis II

## 2022-08-08 NOTE — BH INPATIENT PSYCHIATRY PROGRESS NOTE - NSBHASSESSSUMMFT_PSY_ALL_CORE
Sharona Kumar is a 21 yo female with PMH asthma and PPH MDD, ODD, and cannabis use disorder who was bib mother to [facility] for concerns that pt was not eating, drinking, or acting like herself. Patient seen today where she demonstrates findings of subjective and objectively depressed affect, anhedonia, poor eating, sleeping, concentration, energy, and passive SI over a few weeks' duration. Patient also has some psychotic features including potential internal preoccupation and reports of AH. Findings consistent with major depressive episode with psychotic features. Patient considered to be of potential risk to herself due to depression and suicidality as well as unable to care for herself as she is not tending to her basic needs, requiring admission and treatment.     7/20: Pt more open today, speaks about intimate topics with writer, voices concerns of pregnancy. Denies depressed state and rejects medications but eventually agrees to keep adhering to meds with much encouragement. PO intake increasing. Pt denies SI.     7/25: Pt suffering panic attacks on the unit. Childlike behaviors noted, pt remains anxious and appears depressed. Ativan PRN given with much resistance from the patient.     7/26: Pt calmer, still appearing anxious and depressed. Reports paranoia, opens up more about recent stressors and traumas. Agreed to continue Prozac and started Zyprexa last night with no AE reported. Eating and drinking slightly more.     7/27: Pt anxious, tearful, cannot participate in PCL 5. Disorganized/illogical with persecutory and somatic delusional content.     7/28: Patient noted to be very tearful, difficult to engage in meaningful discussion outside of topic of discharge, had bm yesterday. Orders for constipation in place. Medicine consult continues to follow. Tolerating increase in zyprexa without issue.    7/29: Pt anxious and fearful appearing. Cannot verbalize concerns or converse with writer. Partially adherent to medications.     8/1: Pt remains quite anxious. Poorly related. Paranoia, somatic preoccupations present. Team met with mother today, pending decision of further inpatient tx vs discharge.     8/2: Pt anxious with paranoid ideation and somatic delusions present. Refuses to take medications. Work up returned normal.    8/3: Patient distressed, predominating paranoia, somatically preoccupied. Refusing medications, informed of TOO process.     8/4 Appears psychotic, distressed and confused, seen giggling and smiling to self. Took prn po haldol 5mg/ativan 2mg, poor hygiene- malodorous  8/8 Somatically preoccupied, selectively taking medications    Plan:  #MDE with psychotic features  -Prozac 20 mg for depressive and anxious features   -Zydis 5 mg for psychosis/paranoia, increase as needed   -Encourage pt to attend groups and adhere to medications   -Safety planning prior to discharge   -Ativan PRN 2 mg for anxiety/panic    Current medications acetaminophen     Tablet .. 650 milliGRAM(s) Oral every 6 hours PRN  aluminum hydroxide/magnesium hydroxide/simethicone Suspension 30 milliLiter(s) Oral every 4 hours PRN  diphenhydrAMINE 50 milliGRAM(s) Oral every 6 hours PRN  FLUoxetine Solution 20 milliGRAM(s) Oral daily  haloperidol     Tablet 5 milliGRAM(s) Oral every 6 hours PRN  hydrOXYzine hydrochloride 25 milliGRAM(s) Oral every 6 hours PRN  ibuprofen  Tablet. 200 milliGRAM(s) Oral every 6 hours PRN  ibuprofen  Tablet. 400 milliGRAM(s) Oral every 6 hours PRN  LORazepam     Tablet 2 milliGRAM(s) Oral every 6 hours PRN  magnesium hydroxide Suspension 30 milliLiter(s) Oral daily PRN  OLANZapine Disintegrating Tablet 5 milliGRAM(s) Oral at bedtime  polyethylene glycol 3350 17 Gram(s) Oral two times a day  senna 2 Tablet(s) Oral at bedtime  traZODone 50 milliGRAM(s) Oral at bedtime PRN

## 2022-08-08 NOTE — BH INPATIENT PSYCHIATRY PROGRESS NOTE - NSBHMETABOLIC_PSY_ALL_CORE_FT
BMI: BMI (kg/m2): 22.9 (07-17-22 @ 22:37)  HbA1c: A1C with Estimated Average Glucose Result: 4.7 % (07-18-22 @ 08:41)    Glucose:   BP: 111/76 (08-08-22 @ 08:45) (105/70 - 119/86)  Lipid Panel: Date/Time: 07-18-22 @ 08:41  Cholesterol, Serum: 147  Direct LDL: --  HDL Cholesterol, Serum: 47  Total Cholesterol/HDL Ration Measurement: --  Triglycerides, Serum: 57

## 2022-08-08 NOTE — BH INPATIENT PSYCHIATRY PROGRESS NOTE - CURRENT MEDICATION
MEDICATIONS  (STANDING):  FLUoxetine Solution 20 milliGRAM(s) Oral daily  haloperidol     Tablet 2 milliGRAM(s) Oral every 12 hours  LORazepam     Tablet 1 milliGRAM(s) Oral every 12 hours  OLANZapine Disintegrating Tablet 5 milliGRAM(s) Oral at bedtime  polyethylene glycol 3350 17 Gram(s) Oral two times a day  senna 2 Tablet(s) Oral at bedtime    MEDICATIONS  (PRN):  acetaminophen     Tablet .. 650 milliGRAM(s) Oral every 6 hours PRN Mild Pain (1-3)  aluminum hydroxide/magnesium hydroxide/simethicone Suspension 30 milliLiter(s) Oral every 4 hours PRN Dyspepsia  diphenhydrAMINE 50 milliGRAM(s) Oral every 6 hours PRN agitation  haloperidol     Tablet 5 milliGRAM(s) Oral every 6 hours PRN agitation  hydrOXYzine hydrochloride 25 milliGRAM(s) Oral every 6 hours PRN insomnia/anxiety  ibuprofen  Tablet. 400 milliGRAM(s) Oral every 6 hours PRN headaches  ibuprofen  Tablet. 200 milliGRAM(s) Oral every 6 hours PRN Moderate Pain (4 - 6)  LORazepam     Tablet 2 milliGRAM(s) Oral every 6 hours PRN agitation or anxiety/panic  magnesium hydroxide Suspension 30 milliLiter(s) Oral daily PRN Constipation. 2nd line  traZODone 50 milliGRAM(s) Oral at bedtime PRN insomnia

## 2022-08-08 NOTE — BH INPATIENT PSYCHIATRY PROGRESS NOTE - NSBHFUPINTERVALHXFT_PSY_A_CORE
Patient seen in her room today, more interactive and spontaneous than on prior interaction. She is discharge focused and expresses not liking it in the hospital because the bed in uncomfortable. She reports fair sleep overall and slept last night. Appetite is poor 'I don't like the food here.' She reports eating a small amount of breakfast this morning, but skipped lunch. MAR review indicates that pt has been mostly refusing medications, inconsistently compliant with prozac (did take it today). Discussed with pt TOO and she verbalized understanding. No acute medical concerns, asked about abdominal xrays that she had on 7/27.  Per staff report pt has been isolative, appears thought blocked.

## 2022-08-09 PROCEDURE — 99233 SBSQ HOSP IP/OBS HIGH 50: CPT

## 2022-08-09 RX ADMIN — Medication 1 MILLIGRAM(S): at 21:55

## 2022-08-09 NOTE — BH INPATIENT PSYCHIATRY PROGRESS NOTE - NSBHFUPINTERVALHXFT_PSY_A_CORE
Patient seen in her room today, appears fearful and nervous on approach. She reports feeling 'dizzy' today. She shared that she did not eat breakfast or lunch and did not have dinner last night due to 'not being hungry'. She however had a bag of chips and a gatorade that her mother brought her yesterday when she visited. Denies any other concerns. Discussed with pt the importance of taking her medications and she verbalized understand, stating that she would take her medications. However MAR review indicated that she refused all standing meds.

## 2022-08-09 NOTE — BH INPATIENT PSYCHIATRY PROGRESS NOTE - NSBHCHARTREVIEWVS_PSY_A_CORE FT
Vital Signs Last 24 Hrs  T(C): 37.1 (08-09-22 @ 10:30), Max: 37.1 (08-09-22 @ 10:30)  T(F): 98.7 (08-09-22 @ 10:30), Max: 98.7 (08-09-22 @ 10:30)  HR: 84 (08-09-22 @ 10:30) (73 - 84)  BP: 104/70 (08-09-22 @ 10:30) (104/70 - 124/75)  BP(mean): --  RR: 18 (08-09-22 @ 10:30) (18 - 18)  SpO2: 98% (08-09-22 @ 10:30) (98% - 98%)

## 2022-08-09 NOTE — BH INPATIENT PSYCHIATRY PROGRESS NOTE - NSBHASSESSSUMMFT_PSY_ALL_CORE
Sharona Kumar is a 23 yo female with PMH asthma and PPH MDD, ODD, and cannabis use disorder who was bib mother to [facility] for concerns that pt was not eating, drinking, or acting like herself. Patient seen today where she demonstrates findings of subjective and objectively depressed affect, anhedonia, poor eating, sleeping, concentration, energy, and passive SI over a few weeks' duration. Patient also has some psychotic features including potential internal preoccupation and reports of AH. Findings consistent with major depressive episode with psychotic features. Patient considered to be of potential risk to herself due to depression and suicidality as well as unable to care for herself as she is not tending to her basic needs, requiring admission and treatment.     7/20: Pt more open today, speaks about intimate topics with writer, voices concerns of pregnancy. Denies depressed state and rejects medications but eventually agrees to keep adhering to meds with much encouragement. PO intake increasing. Pt denies SI.     7/25: Pt suffering panic attacks on the unit. Childlike behaviors noted, pt remains anxious and appears depressed. Ativan PRN given with much resistance from the patient.     7/26: Pt calmer, still appearing anxious and depressed. Reports paranoia, opens up more about recent stressors and traumas. Agreed to continue Prozac and started Zyprexa last night with no AE reported. Eating and drinking slightly more.     7/27: Pt anxious, tearful, cannot participate in PCL 5. Disorganized/illogical with persecutory and somatic delusional content.     7/28: Patient noted to be very tearful, difficult to engage in meaningful discussion outside of topic of discharge, had bm yesterday. Orders for constipation in place. Medicine consult continues to follow. Tolerating increase in zyprexa without issue.    7/29: Pt anxious and fearful appearing. Cannot verbalize concerns or converse with writer. Partially adherent to medications.     8/1: Pt remains quite anxious. Poorly related. Paranoia, somatic preoccupations present. Team met with mother today, pending decision of further inpatient tx vs discharge.     8/2: Pt anxious with paranoid ideation and somatic delusions present. Refuses to take medications. Work up returned normal.    8/3: Patient distressed, predominating paranoia, somatically preoccupied. Refusing medications, informed of TOO process.     8/4 Appears psychotic, distressed and confused, seen giggling and smiling to self. Took prn po haldol 5mg/ativan 2mg, poor hygiene- malodorous  8/8 Somatically preoccupied, selectively taking medications  8/9 Refusing medications, appears fearful, may be responding to internal stimuli    Plan:  #MDE with psychotic features  -Prozac 20 mg for depressive and anxious features   -Zydis 5 mg for psychosis/paranoia, increase as needed   -Encourage pt to attend groups and adhere to medications   -Safety planning prior to discharge   -Ativan PRN 2 mg for anxiety/panic    Current medications acetaminophen     Tablet .. 650 milliGRAM(s) Oral every 6 hours PRN  aluminum hydroxide/magnesium hydroxide/simethicone Suspension 30 milliLiter(s) Oral every 4 hours PRN  diphenhydrAMINE 50 milliGRAM(s) Oral every 6 hours PRN  FLUoxetine Solution 20 milliGRAM(s) Oral daily  haloperidol     Tablet 5 milliGRAM(s) Oral every 6 hours PRN  hydrOXYzine hydrochloride 25 milliGRAM(s) Oral every 6 hours PRN  ibuprofen  Tablet. 200 milliGRAM(s) Oral every 6 hours PRN  ibuprofen  Tablet. 400 milliGRAM(s) Oral every 6 hours PRN  LORazepam     Tablet 2 milliGRAM(s) Oral every 6 hours PRN  magnesium hydroxide Suspension 30 milliLiter(s) Oral daily PRN  OLANZapine Disintegrating Tablet 5 milliGRAM(s) Oral at bedtime  polyethylene glycol 3350 17 Gram(s) Oral two times a day  senna 2 Tablet(s) Oral at bedtime  traZODone 50 milliGRAM(s) Oral at bedtime PRN

## 2022-08-09 NOTE — BH INPATIENT PSYCHIATRY PROGRESS NOTE - NSBHMETABOLIC_PSY_ALL_CORE_FT
BMI: BMI (kg/m2): 22.9 (07-17-22 @ 22:37)  HbA1c: A1C with Estimated Average Glucose Result: 4.7 % (07-18-22 @ 08:41)    Glucose:   BP: 104/70 (08-09-22 @ 10:30) (104/70 - 124/75)  Lipid Panel: Date/Time: 07-18-22 @ 08:41  Cholesterol, Serum: 147  Direct LDL: --  HDL Cholesterol, Serum: 47  Total Cholesterol/HDL Ration Measurement: --  Triglycerides, Serum: 57

## 2022-08-09 NOTE — BH TREATMENT PLAN - NSTXDEPRESINTERSW_PSY_ALL_CORE
1. SW will liaison with family/collateral providers as need.  2. Discharge planning.

## 2022-08-09 NOTE — BH TREATMENT PLAN - NSTXDEPRESGOALOTHER_PSY_ALL_CORE
Pt. will participate in groups and milieu structure of the unit
Pt. will participate in groups and milieu tx structure of unit
Pt. will participate in groups and milieu tx structure of unit
Pt. will participate in groups and milieu structure of the unit.
Pt. will participate in groups and milieu tx structure of unit

## 2022-08-09 NOTE — BH PSYCHOLOGY - CLINICIAN PSYCHOTHERAPY NOTE - NSBHPSYCHOLADHERE_PSY_A_CORE FT
She was able to comply, but couldn't do it quickly enough. 
Sharona reported that she would attempt to restart medications and adhere to treatment. She should be closely monitored for the same.

## 2022-08-10 PROCEDURE — 99231 SBSQ HOSP IP/OBS SF/LOW 25: CPT

## 2022-08-10 NOTE — BH INPATIENT PSYCHIATRY PROGRESS NOTE - NSBHASSESSSUMMFT_PSY_ALL_CORE
Sharona Kumar is a 23 yo female with PMH asthma and PPH MDD, ODD, and cannabis use disorder who was bib mother to [facility] for concerns that pt was not eating, drinking, or acting like herself. Patient seen today where she demonstrates findings of subjective and objectively depressed affect, anhedonia, poor eating, sleeping, concentration, energy, and passive SI over a few weeks' duration. Patient also has some psychotic features including potential internal preoccupation and reports of AH. Findings consistent with major depressive episode with psychotic features. Patient considered to be of potential risk to herself due to depression and suicidality as well as unable to care for herself as she is not tending to her basic needs, requiring admission and treatment.     7/20: Pt more open today, speaks about intimate topics with writer, voices concerns of pregnancy. Denies depressed state and rejects medications but eventually agrees to keep adhering to meds with much encouragement. PO intake increasing. Pt denies SI.     7/25: Pt suffering panic attacks on the unit. Childlike behaviors noted, pt remains anxious and appears depressed. Ativan PRN given with much resistance from the patient.     7/26: Pt calmer, still appearing anxious and depressed. Reports paranoia, opens up more about recent stressors and traumas. Agreed to continue Prozac and started Zyprexa last night with no AE reported. Eating and drinking slightly more.     7/27: Pt anxious, tearful, cannot participate in PCL 5. Disorganized/illogical with persecutory and somatic delusional content.     7/28: Patient noted to be very tearful, difficult to engage in meaningful discussion outside of topic of discharge, had bm yesterday. Orders for constipation in place. Medicine consult continues to follow. Tolerating increase in zyprexa without issue.    7/29: Pt anxious and fearful appearing. Cannot verbalize concerns or converse with writer. Partially adherent to medications.     8/1: Pt remains quite anxious. Poorly related. Paranoia, somatic preoccupations present. Team met with mother today, pending decision of further inpatient tx vs discharge.     8/2: Pt anxious with paranoid ideation and somatic delusions present. Refuses to take medications. Work up returned normal.    8/3: Patient distressed, predominating paranoia, somatically preoccupied. Refusing medications, informed of TOO process.     8/4 Appears psychotic, distressed and confused, seen giggling and smiling to self. Took prn po haldol 5mg/ativan 2mg, poor hygiene- malodorous  8/8 Somatically preoccupied, selectively taking medications  8/9 Refusing medications, appears fearful, may be responding to internal stimuli  8/10 Continues to refuse medications, largely mute during interview. Should consider TOO.    Plan:  #MDE with psychotic features  -Prozac 20 mg for depressive and anxious features   -Zydis 5 mg for psychosis/paranoia, increase as needed   -Encourage pt to attend groups and adhere to medications   -Safety planning prior to discharge   -Ativan PRN 2 mg for anxiety/panic    Current medications acetaminophen     Tablet .. 650 milliGRAM(s) Oral every 6 hours PRN  aluminum hydroxide/magnesium hydroxide/simethicone Suspension 30 milliLiter(s) Oral every 4 hours PRN  diphenhydrAMINE 50 milliGRAM(s) Oral every 6 hours PRN  FLUoxetine Solution 20 milliGRAM(s) Oral daily  haloperidol     Tablet 5 milliGRAM(s) Oral every 6 hours PRN  hydrOXYzine hydrochloride 25 milliGRAM(s) Oral every 6 hours PRN  ibuprofen  Tablet. 200 milliGRAM(s) Oral every 6 hours PRN  ibuprofen  Tablet. 400 milliGRAM(s) Oral every 6 hours PRN  LORazepam     Tablet 2 milliGRAM(s) Oral every 6 hours PRN  magnesium hydroxide Suspension 30 milliLiter(s) Oral daily PRN  OLANZapine Disintegrating Tablet 5 milliGRAM(s) Oral at bedtime  polyethylene glycol 3350 17 Gram(s) Oral two times a day  senna 2 Tablet(s) Oral at bedtime  traZODone 50 milliGRAM(s) Oral at bedtime PRN

## 2022-08-10 NOTE — BH INPATIENT PSYCHIATRY PROGRESS NOTE - NSBHFUPINTERVALHXFT_PSY_A_CORE
Interval history reviewed. Nursing reports patient is eating, but overall low PO intake. Patient visualized on the unit phone. Refusing all meds.     Patient interviewed in her room, visualized her sitting at her desk. Upon approach, states "I'm alright" but unable to verbalize anything further the rest of the interview. Observed bringing a tissue from her lap to wipe and then back down to her lap 15+ times. Unable to follow commands. No eye contact. Encouraged to take medications and increase PO intake.

## 2022-08-10 NOTE — BH INPATIENT PSYCHIATRY PROGRESS NOTE - NSBHMSELANG_PSY_A_CORE
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   - s/p flex sig 9/15/17 showing erythematous mucosa in the recto-sigmoid colon (biopsied)  - methylprednisolone 80mg IV daily started 9/14/17  - serum CMV negative, positive on GI biopsy stain  - solumedrol increased to 80mg iv bid on 9/17, diarrhea improved 9/20 and steroids decreased, increased back to 80 mg bid 9/21 due to increased stool output   - budesonide 9 mg 9/17   Unable to assess

## 2022-08-10 NOTE — BH INPATIENT PSYCHIATRY PROGRESS NOTE - NSBHMETABOLIC_PSY_ALL_CORE_FT
BMI: BMI (kg/m2): 22.9 (07-17-22 @ 22:37)  HbA1c: A1C with Estimated Average Glucose Result: 4.7 % (07-18-22 @ 08:41)    Glucose:   BP: 105/67 (08-10-22 @ 08:45) (104/70 - 124/75)  Lipid Panel: Date/Time: 07-18-22 @ 08:41  Cholesterol, Serum: 147  Direct LDL: --  HDL Cholesterol, Serum: 47  Total Cholesterol/HDL Ration Measurement: --  Triglycerides, Serum: 57

## 2022-08-10 NOTE — BH INPATIENT PSYCHIATRY PROGRESS NOTE - CURRENT MEDICATION
MEDICATIONS  (STANDING):  FLUoxetine Solution 20 milliGRAM(s) Oral daily  haloperidol     Tablet 2 milliGRAM(s) Oral every 12 hours  LORazepam     Tablet 1 milliGRAM(s) Oral every 12 hours  OLANZapine Disintegrating Tablet 5 milliGRAM(s) Oral at bedtime  polyethylene glycol 3350 17 Gram(s) Oral two times a day  senna 2 Tablet(s) Oral at bedtime    MEDICATIONS  (PRN):  acetaminophen     Tablet .. 650 milliGRAM(s) Oral every 6 hours PRN Mild Pain (1-3)  aluminum hydroxide/magnesium hydroxide/simethicone Suspension 30 milliLiter(s) Oral every 4 hours PRN Dyspepsia  diphenhydrAMINE 50 milliGRAM(s) Oral every 6 hours PRN agitation  haloperidol     Tablet 5 milliGRAM(s) Oral every 6 hours PRN agitation  hydrOXYzine hydrochloride 25 milliGRAM(s) Oral every 6 hours PRN insomnia/anxiety  ibuprofen  Tablet. 200 milliGRAM(s) Oral every 6 hours PRN Moderate Pain (4 - 6)  ibuprofen  Tablet. 400 milliGRAM(s) Oral every 6 hours PRN headaches  LORazepam     Tablet 2 milliGRAM(s) Oral every 6 hours PRN agitation or anxiety/panic  magnesium hydroxide Suspension 30 milliLiter(s) Oral daily PRN Constipation. 2nd line  traZODone 50 milliGRAM(s) Oral at bedtime PRN insomnia

## 2022-08-10 NOTE — BH INPATIENT PSYCHIATRY PROGRESS NOTE - NSBHCHARTREVIEWVS_PSY_A_CORE FT
Vital Signs Last 24 Hrs  T(C): 37.2 (08-10-22 @ 08:45), Max: 37.2 (08-10-22 @ 08:45)  T(F): 99 (08-10-22 @ 08:45), Max: 99 (08-10-22 @ 08:45)  HR: 125 (08-10-22 @ 08:45) (78 - 125)  BP: 105/67 (08-10-22 @ 08:45) (105/67 - 111/74)  BP(mean): --  RR: 18 (08-10-22 @ 08:45) (18 - 18)  SpO2: 98% (08-10-22 @ 08:45) (98% - 100%)

## 2022-08-11 PROCEDURE — 99233 SBSQ HOSP IP/OBS HIGH 50: CPT

## 2022-08-11 NOTE — BH INPATIENT PSYCHIATRY PROGRESS NOTE - CURRENT MEDICATION
MEDICATIONS  (STANDING):  FLUoxetine Solution 20 milliGRAM(s) Oral daily  haloperidol     Tablet 2 milliGRAM(s) Oral every 12 hours  LORazepam     Tablet 1 milliGRAM(s) Oral every 12 hours  LORazepam   Injectable 1 milliGRAM(s) IntraMuscular once  OLANZapine Disintegrating Tablet 5 milliGRAM(s) Oral at bedtime  polyethylene glycol 3350 17 Gram(s) Oral two times a day  senna 2 Tablet(s) Oral at bedtime    MEDICATIONS  (PRN):  acetaminophen     Tablet .. 650 milliGRAM(s) Oral every 6 hours PRN Mild Pain (1-3)  aluminum hydroxide/magnesium hydroxide/simethicone Suspension 30 milliLiter(s) Oral every 4 hours PRN Dyspepsia  diphenhydrAMINE 50 milliGRAM(s) Oral every 6 hours PRN agitation  haloperidol     Tablet 5 milliGRAM(s) Oral every 6 hours PRN agitation  hydrOXYzine hydrochloride 25 milliGRAM(s) Oral every 6 hours PRN insomnia/anxiety  ibuprofen  Tablet. 400 milliGRAM(s) Oral every 6 hours PRN headaches  ibuprofen  Tablet. 200 milliGRAM(s) Oral every 6 hours PRN Moderate Pain (4 - 6)  LORazepam     Tablet 2 milliGRAM(s) Oral every 6 hours PRN agitation or anxiety/panic  magnesium hydroxide Suspension 30 milliLiter(s) Oral daily PRN Constipation. 2nd line  traZODone 50 milliGRAM(s) Oral at bedtime PRN insomnia   MEDICATIONS  (STANDING):  FLUoxetine Solution 20 milliGRAM(s) Oral daily  haloperidol     Tablet 2 milliGRAM(s) Oral every 12 hours  LORazepam     Tablet 1 milliGRAM(s) Oral every 12 hours  LORazepam   Injectable 1 milliGRAM(s) IntraMuscular once  OLANZapine Disintegrating Tablet 5 milliGRAM(s) Oral at bedtime  polyethylene glycol 3350 17 Gram(s) Oral two times a day  senna 2 Tablet(s) Oral at bedtime    MEDICATIONS  (PRN):  acetaminophen     Tablet .. 650 milliGRAM(s) Oral every 6 hours PRN Mild Pain (1-3)  aluminum hydroxide/magnesium hydroxide/simethicone Suspension 30 milliLiter(s) Oral every 4 hours PRN Dyspepsia  diphenhydrAMINE 50 milliGRAM(s) Oral every 6 hours PRN agitation  haloperidol     Tablet 5 milliGRAM(s) Oral every 6 hours PRN agitation  hydrOXYzine hydrochloride 25 milliGRAM(s) Oral every 6 hours PRN insomnia/anxiety  ibuprofen  Tablet. 200 milliGRAM(s) Oral every 6 hours PRN Moderate Pain (4 - 6)  ibuprofen  Tablet. 400 milliGRAM(s) Oral every 6 hours PRN headaches  LORazepam     Tablet 2 milliGRAM(s) Oral every 6 hours PRN agitation or anxiety/panic  magnesium hydroxide Suspension 30 milliLiter(s) Oral daily PRN Constipation. 2nd line  traZODone 50 milliGRAM(s) Oral at bedtime PRN insomnia   MEDICATIONS  (STANDING):  FLUoxetine Solution 20 milliGRAM(s) Oral daily  haloperidol     Tablet 2 milliGRAM(s) Oral every 12 hours  LORazepam     Tablet 1 milliGRAM(s) Oral every 12 hours  OLANZapine Disintegrating Tablet 5 milliGRAM(s) Oral at bedtime  polyethylene glycol 3350 17 Gram(s) Oral two times a day  senna 2 Tablet(s) Oral at bedtime    MEDICATIONS  (PRN):  acetaminophen     Tablet .. 650 milliGRAM(s) Oral every 6 hours PRN Mild Pain (1-3)  aluminum hydroxide/magnesium hydroxide/simethicone Suspension 30 milliLiter(s) Oral every 4 hours PRN Dyspepsia  diphenhydrAMINE 50 milliGRAM(s) Oral every 6 hours PRN agitation  haloperidol     Tablet 5 milliGRAM(s) Oral every 6 hours PRN agitation  hydrOXYzine hydrochloride 25 milliGRAM(s) Oral every 6 hours PRN insomnia/anxiety  ibuprofen  Tablet. 400 milliGRAM(s) Oral every 6 hours PRN headaches  ibuprofen  Tablet. 200 milliGRAM(s) Oral every 6 hours PRN Moderate Pain (4 - 6)  LORazepam     Tablet 2 milliGRAM(s) Oral every 6 hours PRN agitation or anxiety/panic  magnesium hydroxide Suspension 30 milliLiter(s) Oral daily PRN Constipation. 2nd line  traZODone 50 milliGRAM(s) Oral at bedtime PRN insomnia

## 2022-08-11 NOTE — BH INPATIENT PSYCHIATRY PROGRESS NOTE - NSBHMETABOLIC_PSY_ALL_CORE_FT
BMI: BMI (kg/m2): 22.9 (07-17-22 @ 22:37)  HbA1c: A1C with Estimated Average Glucose Result: 4.7 % (07-18-22 @ 08:41)    Glucose:   BP: 111/80 (08-11-22 @ 13:46) (104/70 - 124/75)  Lipid Panel: Date/Time: 07-18-22 @ 08:41  Cholesterol, Serum: 147  Direct LDL: --  HDL Cholesterol, Serum: 47  Total Cholesterol/HDL Ration Measurement: --  Triglycerides, Serum: 57

## 2022-08-11 NOTE — BH INPATIENT PSYCHIATRY PROGRESS NOTE - NSBHCHARTREVIEWVS_PSY_A_CORE FT
Vital Signs Last 24 Hrs  T(C): 36.7 (08-11-22 @ 13:46), Max: 36.8 (08-10-22 @ 17:35)  T(F): 98 (08-11-22 @ 13:46), Max: 98.2 (08-10-22 @ 17:35)  HR: 82 (08-11-22 @ 13:46) (82 - 87)  BP: 111/80 (08-11-22 @ 13:46) (111/80 - 123/86)  BP(mean): --  RR: 18 (08-11-22 @ 13:46) (18 - 18)  SpO2: 98% (08-11-22 @ 13:46) (98% - 100%)

## 2022-08-11 NOTE — BH INPATIENT PSYCHIATRY PROGRESS NOTE - PRN MEDS
MEDICATIONS  (PRN):  acetaminophen     Tablet .. 650 milliGRAM(s) Oral every 6 hours PRN Mild Pain (1-3)  aluminum hydroxide/magnesium hydroxide/simethicone Suspension 30 milliLiter(s) Oral every 4 hours PRN Dyspepsia  diphenhydrAMINE 50 milliGRAM(s) Oral every 6 hours PRN agitation  haloperidol     Tablet 5 milliGRAM(s) Oral every 6 hours PRN agitation  hydrOXYzine hydrochloride 25 milliGRAM(s) Oral every 6 hours PRN insomnia/anxiety  ibuprofen  Tablet. 400 milliGRAM(s) Oral every 6 hours PRN headaches  ibuprofen  Tablet. 200 milliGRAM(s) Oral every 6 hours PRN Moderate Pain (4 - 6)  LORazepam     Tablet 2 milliGRAM(s) Oral every 6 hours PRN agitation or anxiety/panic  magnesium hydroxide Suspension 30 milliLiter(s) Oral daily PRN Constipation. 2nd line  traZODone 50 milliGRAM(s) Oral at bedtime PRN insomnia   MEDICATIONS  (PRN):  acetaminophen     Tablet .. 650 milliGRAM(s) Oral every 6 hours PRN Mild Pain (1-3)  aluminum hydroxide/magnesium hydroxide/simethicone Suspension 30 milliLiter(s) Oral every 4 hours PRN Dyspepsia  diphenhydrAMINE 50 milliGRAM(s) Oral every 6 hours PRN agitation  haloperidol     Tablet 5 milliGRAM(s) Oral every 6 hours PRN agitation  hydrOXYzine hydrochloride 25 milliGRAM(s) Oral every 6 hours PRN insomnia/anxiety  ibuprofen  Tablet. 200 milliGRAM(s) Oral every 6 hours PRN Moderate Pain (4 - 6)  ibuprofen  Tablet. 400 milliGRAM(s) Oral every 6 hours PRN headaches  LORazepam     Tablet 2 milliGRAM(s) Oral every 6 hours PRN agitation or anxiety/panic  magnesium hydroxide Suspension 30 milliLiter(s) Oral daily PRN Constipation. 2nd line  traZODone 50 milliGRAM(s) Oral at bedtime PRN insomnia

## 2022-08-11 NOTE — BH INPATIENT PSYCHIATRY PROGRESS NOTE - NSBHFUPINTERVALHXFT_PSY_A_CORE
**Note In Progress**    Interval history reviewed. Refusing all medications, today completely nonverbal, minimal PO intake per staff. Observed slowing walking the halls.     When attempted to  Interval history reviewed. Refusing all medications, today completely nonverbal, minimal PO intake per staff. Observed slowing walking the halls.     When attempted to interview, patient nonverbal, made no eye contact, unable to follow commands, seemed unable/unwilling to drink bottle of water provided. Observed to be repeatedly pacing the hallways. With a large amount of time and prompting, patient stated "I just want whatever is in my stomach out" and cried.  Interval history reviewed. Refusing all medications, today completely nonverbal, minimal PO intake per staff. Observed slowing walking the halls.     When attempted to interview, patient nonverbal, made no eye contact, unable to follow commands, seemed unable/unwilling to drink bottle of water provided. Observed to be repeatedly pacing the hallways. With a large amount of time and prompting, patient was observed to be tearful and angrily stated "I just want whatever is in my stomach out" and "I'm not stupid, you all are not taking me seriously!"

## 2022-08-11 NOTE — BH INPATIENT PSYCHIATRY PROGRESS NOTE - NSBHASSESSSUMMFT_PSY_ALL_CORE
Sharona Kumar is a 21 yo female with PMH asthma and PPH MDD, ODD, and cannabis use disorder who was bib mother to [facility] for concerns that pt was not eating, drinking, or acting like herself. Patient seen today where she demonstrates findings of subjective and objectively depressed affect, anhedonia, poor eating, sleeping, concentration, energy, and passive SI over a few weeks' duration. Patient also has some psychotic features including potential internal preoccupation and reports of AH. Findings consistent with major depressive episode with psychotic features. Patient considered to be of potential risk to herself due to depression and suicidality as well as unable to care for herself as she is not tending to her basic needs, requiring admission and treatment.     7/20: Pt more open today, speaks about intimate topics with writer, voices concerns of pregnancy. Denies depressed state and rejects medications but eventually agrees to keep adhering to meds with much encouragement. PO intake increasing. Pt denies SI.     7/25: Pt suffering panic attacks on the unit. Childlike behaviors noted, pt remains anxious and appears depressed. Ativan PRN given with much resistance from the patient.     7/26: Pt calmer, still appearing anxious and depressed. Reports paranoia, opens up more about recent stressors and traumas. Agreed to continue Prozac and started Zyprexa last night with no AE reported. Eating and drinking slightly more.     7/27: Pt anxious, tearful, cannot participate in PCL 5. Disorganized/illogical with persecutory and somatic delusional content.     7/28: Patient noted to be very tearful, difficult to engage in meaningful discussion outside of topic of discharge, had bm yesterday. Orders for constipation in place. Medicine consult continues to follow. Tolerating increase in zyprexa without issue.    7/29: Pt anxious and fearful appearing. Cannot verbalize concerns or converse with writer. Partially adherent to medications.     8/1: Pt remains quite anxious. Poorly related. Paranoia, somatic preoccupations present. Team met with mother today, pending decision of further inpatient tx vs discharge.     8/2: Pt anxious with paranoid ideation and somatic delusions present. Refuses to take medications. Work up returned normal.    8/3: Patient distressed, predominating paranoia, somatically preoccupied. Refusing medications, informed of TOO process.     8/4 Appears psychotic, distressed and confused, seen giggling and smiling to self. Took prn po haldol 5mg/ativan 2mg, poor hygiene- malodorous  8/8 Somatically preoccupied, selectively taking medications  8/9 Refusing medications, appears fearful, may be responding to internal stimuli  8/10 Continues to refuse medications, largely mute during interview. Should consider TOO.    Plan:  #MDE with psychotic features  -Prozac 20 mg for depressive and anxious features   -Zydis 5 mg for psychosis/paranoia, increase as needed   -Encourage pt to attend groups and adhere to medications   -Safety planning prior to discharge   -Ativan PRN 2 mg for anxiety/panic    Current medications acetaminophen     Tablet .. 650 milliGRAM(s) Oral every 6 hours PRN  aluminum hydroxide/magnesium hydroxide/simethicone Suspension 30 milliLiter(s) Oral every 4 hours PRN  diphenhydrAMINE 50 milliGRAM(s) Oral every 6 hours PRN  FLUoxetine Solution 20 milliGRAM(s) Oral daily  haloperidol     Tablet 5 milliGRAM(s) Oral every 6 hours PRN  hydrOXYzine hydrochloride 25 milliGRAM(s) Oral every 6 hours PRN  ibuprofen  Tablet. 200 milliGRAM(s) Oral every 6 hours PRN  ibuprofen  Tablet. 400 milliGRAM(s) Oral every 6 hours PRN  LORazepam     Tablet 2 milliGRAM(s) Oral every 6 hours PRN  magnesium hydroxide Suspension 30 milliLiter(s) Oral daily PRN  OLANZapine Disintegrating Tablet 5 milliGRAM(s) Oral at bedtime  polyethylene glycol 3350 17 Gram(s) Oral two times a day  senna 2 Tablet(s) Oral at bedtime  traZODone 50 milliGRAM(s) Oral at bedtime PRN   Sharona Kumar is a 23 yo female with PMH asthma and PPH MDD, ODD, and cannabis use disorder who was bib mother to [facility] for concerns that pt was not eating, drinking, or acting like herself. Patient seen today where she demonstrates findings of subjective and objectively depressed affect, anhedonia, poor eating, sleeping, concentration, energy, and passive SI over a few weeks' duration. Patient also has some psychotic features including potential internal preoccupation and reports of AH. Findings consistent with major depressive episode with psychotic features. Patient considered to be of potential risk to herself due to depression and suicidality as well as unable to care for herself as she is not tending to her basic needs, requiring admission and treatment.     7/20: Pt more open today, speaks about intimate topics with writer, voices concerns of pregnancy. Denies depressed state and rejects medications but eventually agrees to keep adhering to meds with much encouragement. PO intake increasing. Pt denies SI.     7/25: Pt suffering panic attacks on the unit. Childlike behaviors noted, pt remains anxious and appears depressed. Ativan PRN given with much resistance from the patient.     7/26: Pt calmer, still appearing anxious and depressed. Reports paranoia, opens up more about recent stressors and traumas. Agreed to continue Prozac and started Zyprexa last night with no AE reported. Eating and drinking slightly more.     7/27: Pt anxious, tearful, cannot participate in PCL 5. Disorganized/illogical with persecutory and somatic delusional content.     7/28: Patient noted to be very tearful, difficult to engage in meaningful discussion outside of topic of discharge, had bm yesterday. Orders for constipation in place. Medicine consult continues to follow. Tolerating increase in zyprexa without issue.    7/29: Pt anxious and fearful appearing. Cannot verbalize concerns or converse with writer. Partially adherent to medications.     8/1: Pt remains quite anxious. Poorly related. Paranoia, somatic preoccupations present. Team met with mother today, pending decision of further inpatient tx vs discharge.     8/2: Pt anxious with paranoid ideation and somatic delusions present. Refuses to take medications. Work up returned normal.    8/3: Patient distressed, predominating paranoia, somatically preoccupied. Refusing medications, informed of TOO process.     8/4 Appears psychotic, distressed and confused, seen giggling and smiling to self. Took prn po haldol 5mg/ativan 2mg, poor hygiene- malodorous  8/8 Somatically preoccupied, selectively taking medications  8/9 Refusing medications, appears fearful, may be responding to internal stimuli  8/10 Continues to refuse medications, largely mute during interview. Should consider TOO.  8/11 Almost completely nonverbal; unable to follow commands; little to no PO intake. Eventually tearfully voiced concerns over "this thing" in her abdomen.     Plan:  #MDE with psychotic features  -Prozac 20 mg for depressive and anxious features   -Zydis 5 mg for psychosis/paranoia, increase as needed   -Encourage pt to attend groups and adhere to medications   -Safety planning prior to discharge   -Ativan PRN 2 mg for anxiety/panic    Current medications acetaminophen     Tablet .. 650 milliGRAM(s) Oral every 6 hours PRN  aluminum hydroxide/magnesium hydroxide/simethicone Suspension 30 milliLiter(s) Oral every 4 hours PRN  diphenhydrAMINE 50 milliGRAM(s) Oral every 6 hours PRN  FLUoxetine Solution 20 milliGRAM(s) Oral daily  haloperidol     Tablet 5 milliGRAM(s) Oral every 6 hours PRN  hydrOXYzine hydrochloride 25 milliGRAM(s) Oral every 6 hours PRN  ibuprofen  Tablet. 200 milliGRAM(s) Oral every 6 hours PRN  ibuprofen  Tablet. 400 milliGRAM(s) Oral every 6 hours PRN  LORazepam     Tablet 2 milliGRAM(s) Oral every 6 hours PRN  magnesium hydroxide Suspension 30 milliLiter(s) Oral daily PRN  OLANZapine Disintegrating Tablet 5 milliGRAM(s) Oral at bedtime  polyethylene glycol 3350 17 Gram(s) Oral two times a day  senna 2 Tablet(s) Oral at bedtime  traZODone 50 milliGRAM(s) Oral at bedtime PRN

## 2022-08-11 NOTE — BH INPATIENT PSYCHIATRY PROGRESS NOTE - NSBHMSESPABN_PSY_A_CORE
Soft volume/Slowed rate/Decreased productivity/Increased latency
Soft volume/Slowed rate/Increased latency
Soft volume/Slowed rate/Increased latency
Soft volume/Slowed rate/Decreased productivity
Soft volume/Slowed rate/Decreased productivity/Increased latency
Soft volume/Slowed rate/Decreased productivity/Increased latency/Other
Soft volume/Slowed rate/Decreased productivity/Increased latency
Soft volume/Slowed rate/Decreased productivity
Soft volume/Slowed rate/Decreased productivity/Increased latency
Soft volume/Slowed rate/Increased latency
Soft volume/Slowed rate/Decreased productivity
Soft volume/Slowed rate/Decreased productivity/Increased latency
Soft volume/Slowed rate/Decreased productivity
Soft volume/Slowed rate/Increased latency
Soft volume/Slowed rate/Decreased productivity/Increased latency/Other
Soft volume/Slowed rate/Decreased productivity/Increased latency
Soft volume/Slowed rate/Decreased productivity

## 2022-08-12 PROCEDURE — 99233 SBSQ HOSP IP/OBS HIGH 50: CPT

## 2022-08-12 NOTE — BH PSYCHOLOGY - CLINICIAN PSYCHOTHERAPY NOTE - NSBHPSYCHOLPARTICIP_PSY_A_CORE
Non-participatory
Fully engaged
Fully engaged
Partially engaged
Non-participatory
Partially engaged
Non-participatory

## 2022-08-12 NOTE — BH PSYCHOLOGY - CLINICIAN PSYCHOTHERAPY NOTE - NSTXPSYCHOGOAL_PSY_ALL_CORE
Will be able to report experiencing hallucinations to staff

## 2022-08-12 NOTE — BH INPATIENT PSYCHIATRY PROGRESS NOTE - NSBHMETABOLIC_PSY_ALL_CORE_FT
BMI: BMI (kg/m2): 22.9 (07-17-22 @ 22:37)  HbA1c: A1C with Estimated Average Glucose Result: 4.7 % (07-18-22 @ 08:41)    Glucose:   BP: 116/78 (08-11-22 @ 19:53) (105/67 - 123/86)  Lipid Panel: Date/Time: 07-18-22 @ 08:41  Cholesterol, Serum: 147  Direct LDL: --  HDL Cholesterol, Serum: 47  Total Cholesterol/HDL Ration Measurement: --  Triglycerides, Serum: 57

## 2022-08-12 NOTE — BH PSYCHOLOGY - CLINICIAN PSYCHOTHERAPY NOTE - NSTXDEPRESGOAL_PSY_ALL_CORE
Other...
Exhibit improvements in self-grooming, hygiene, sleep and appetite

## 2022-08-12 NOTE — BH PSYCHOLOGY - CLINICIAN PSYCHOTHERAPY NOTE - NSBHPSYCHOLGOALS_PSY_A_CORE
Decrease symptoms/Assessment/Improve social/vocational/coping skills/Psychoeducation/Treatment compliance
Decrease symptoms/Assessment/Improve social/vocational/coping skills
Decrease symptoms/Assessment/Improve social/vocational/coping skills
Decrease symptoms/Assessment/Prevent relapse/Psychoeducation/Treatment compliance
Decrease symptoms/Assessment/Prevent relapse/Treatment compliance
Decrease symptoms/Assessment/Prevent relapse/Psychoeducation/Treatment compliance
Assessment/other...

## 2022-08-12 NOTE — BH INPATIENT PSYCHIATRY PROGRESS NOTE - NSBHATTESTATTENDNAMEFT_PSY_A_CORE
Prashanth Rosales

## 2022-08-12 NOTE — BH PSYCHOLOGY - CLINICIAN PSYCHOTHERAPY NOTE - NSBHPSYCHOLASSESSPROV_PSY_A_CORE
Psychology Trainee only

## 2022-08-12 NOTE — BH INPATIENT PSYCHIATRY PROGRESS NOTE - NSBHASSESSSUMMFT_PSY_ALL_CORE
Sharona Kumar is a 23 yo female with PMH asthma and PPH MDD, ODD, and cannabis use disorder who was bib mother to [facility] for concerns that pt was not eating, drinking, or acting like herself. Patient seen today where she demonstrates findings of subjective and objectively depressed affect, anhedonia, poor eating, sleeping, concentration, energy, and passive SI over a few weeks' duration. Patient also has some psychotic features including potential internal preoccupation and reports of AH. Findings consistent with major depressive episode with psychotic features. Patient considered to be of potential risk to herself due to depression and suicidality as well as unable to care for herself as she is not tending to her basic needs, requiring admission and treatment.     7/20: Pt more open today, speaks about intimate topics with writer, voices concerns of pregnancy. Denies depressed state and rejects medications but eventually agrees to keep adhering to meds with much encouragement. PO intake increasing. Pt denies SI.     7/25: Pt suffering panic attacks on the unit. Childlike behaviors noted, pt remains anxious and appears depressed. Ativan PRN given with much resistance from the patient.     7/26: Pt calmer, still appearing anxious and depressed. Reports paranoia, opens up more about recent stressors and traumas. Agreed to continue Prozac and started Zyprexa last night with no AE reported. Eating and drinking slightly more.     7/27: Pt anxious, tearful, cannot participate in PCL 5. Disorganized/illogical with persecutory and somatic delusional content.     7/28: Patient noted to be very tearful, difficult to engage in meaningful discussion outside of topic of discharge, had bm yesterday. Orders for constipation in place. Medicine consult continues to follow. Tolerating increase in zyprexa without issue.    7/29: Pt anxious and fearful appearing. Cannot verbalize concerns or converse with writer. Partially adherent to medications.     8/1: Pt remains quite anxious. Poorly related. Paranoia, somatic preoccupations present. Team met with mother today, pending decision of further inpatient tx vs discharge.     8/2: Pt anxious with paranoid ideation and somatic delusions present. Refuses to take medications. Work up returned normal.    8/3: Patient distressed, predominating paranoia, somatically preoccupied. Refusing medications, informed of TOO process.     8/4 Appears psychotic, distressed and confused, seen giggling and smiling to self. Took prn po haldol 5mg/ativan 2mg, poor hygiene- malodorous  8/8 Somatically preoccupied, selectively taking medications  8/9 Refusing medications, appears fearful, may be responding to internal stimuli  8/10 Continues to refuse medications, largely mute during interview. Should consider TOO.  8/11 Almost completely nonverbal; unable to follow commands; little to no PO intake. Eventually tearfully voiced concerns over "this thing" in her abdomen.   8/12 Not verbally interacting with staff today, yesterday she told staff that he had something in her stomach that was causing her to become weak, would not allow abdominal exam    Plan:  #MDE with psychotic features  -Prozac 20 mg for depressive and anxious features   -Zydis 5 mg for psychosis/paranoia, increase as needed   -Encourage pt to attend groups and adhere to medications   -Safety planning prior to discharge   -Ativan PRN 2 mg for anxiety/panic    Current medications acetaminophen     Tablet .. 650 milliGRAM(s) Oral every 6 hours PRN  aluminum hydroxide/magnesium hydroxide/simethicone Suspension 30 milliLiter(s) Oral every 4 hours PRN  diphenhydrAMINE 50 milliGRAM(s) Oral every 6 hours PRN  FLUoxetine Solution 20 milliGRAM(s) Oral daily  haloperidol     Tablet 5 milliGRAM(s) Oral every 6 hours PRN  hydrOXYzine hydrochloride 25 milliGRAM(s) Oral every 6 hours PRN  ibuprofen  Tablet. 200 milliGRAM(s) Oral every 6 hours PRN  ibuprofen  Tablet. 400 milliGRAM(s) Oral every 6 hours PRN  LORazepam     Tablet 2 milliGRAM(s) Oral every 6 hours PRN  magnesium hydroxide Suspension 30 milliLiter(s) Oral daily PRN  OLANZapine Disintegrating Tablet 5 milliGRAM(s) Oral at bedtime  polyethylene glycol 3350 17 Gram(s) Oral two times a day  senna 2 Tablet(s) Oral at bedtime  traZODone 50 milliGRAM(s) Oral at bedtime PRN

## 2022-08-12 NOTE — BH INPATIENT PSYCHIATRY PROGRESS NOTE - NSBHATTESTBILLONSITE_PSY_A_CORE
JENNIFER to bill

## 2022-08-12 NOTE — BH PSYCHOLOGY - CLINICIAN PSYCHOTHERAPY NOTE - NSBHPSYCHOLRESPONSE_PSY_A_CORE
other...
Accepted support
Symptoms reduced/Coping skills acquired/Accepted support
other...
other...
Coping skills acquired/Insight displayed/Accepted support
other...

## 2022-08-12 NOTE — BH PSYCHOLOGY - CLINICIAN PSYCHOTHERAPY NOTE - NSTXDEPRESINTERPSY_PSY_ALL_CORE
individual therapy, group therapy, 

## 2022-08-12 NOTE — BH PSYCHOLOGY - CLINICIAN PSYCHOTHERAPY NOTE - NSBHPSYCHOLPROBS_PSY_ALL_CORE
Depression
Anxiety/Depression/Disorganization/Psychosis/Other...
Anxiety/Depression/Psychosis
Anxiety/Depression/Psychosis
Anxiety/Depression

## 2022-08-12 NOTE — BH PSYCHOLOGY - CLINICIAN PSYCHOTHERAPY NOTE - NSBHPTASSESSDT_PSY_A_CORE
09-Aug-2022 13:15
06-Aug-2022 10:45
26-Jul-2022 10:35
22-Jul-2022 08:40
29-Jul-2022 10:30
02-Aug-2022 13:30
12-Aug-2022 10:20

## 2022-08-12 NOTE — BH INPATIENT PSYCHIATRY PROGRESS NOTE - NSBHATTESTAPPBILLTIME_PSY_A_CORE
I attest my time as JENNIFER is greater than 50% of the total combined time spent on qualifying patient care activities. I have reviewed and verified the documentation.

## 2022-08-12 NOTE — BH INPATIENT PSYCHIATRY PROGRESS NOTE - NSBHCHARTREVIEWVS_PSY_A_CORE FT
Vital Signs Last 24 Hrs  T(C): 36.7 (08-11-22 @ 13:46), Max: 36.7 (08-11-22 @ 13:46)  T(F): 98 (08-11-22 @ 13:46), Max: 98 (08-11-22 @ 13:46)  HR: 89 (08-11-22 @ 19:53) (82 - 89)  BP: 116/78 (08-11-22 @ 19:53) (111/80 - 116/78)  BP(mean): --  RR: 16 (08-11-22 @ 19:53) (16 - 18)  SpO2: 99% (08-11-22 @ 19:53) (98% - 99%)

## 2022-08-12 NOTE — BH PSYCHOLOGY - CLINICIAN PSYCHOTHERAPY NOTE - TOKEN PULL-DIAGNOSIS
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

## 2022-08-12 NOTE — BH PSYCHOLOGY - CLINICIAN PSYCHOTHERAPY NOTE - NSTXDEPRESGOALOTHER_PSY_ALL_CORE
Pt. will participate in groups and milieu structure of the unit
Pt. will participate in groups and milieu structure of the unit.
Pt. will participate in groups and milieu tx structure of unit
Pt. will participate in groups and milieu structure of the unit.
Pt. will participate in groups and milieu tx structure of unit
Pt. will participate in groups and milieu tx structure of unit

## 2022-08-12 NOTE — BH INPATIENT PSYCHIATRY PROGRESS NOTE - NSBHFUPINTERVALHXFT_PSY_A_CORE
Interval history reviewed. Refusing all medications, today completely nonverbal, minimal PO intake per staff.     Patient seen in her room today seated at edge of her edge, crying. Does not verbally interact with writer despite much prompting. Minimal eye contact made.

## 2022-08-12 NOTE — BH PSYCHOLOGY - CLINICIAN PSYCHOTHERAPY NOTE - NSBHPSYCHOLINT_PSY_A_CORE
Dynamic issues addressed/Stress management/Supported coping skills/Supportive therapy
Dialectical  Behavioral Therapy (DBT)/Encourage medication compliance/Stress management/Supported coping skills/Supportive therapy/Treatment compliance encouraged
Encourage medication compliance/Supported coping skills/Supportive therapy/Treatment compliance encouraged
Encourage medication compliance/Supportive therapy/Treatment compliance encouraged
Dynamic issues addressed/Reality testing/Supported coping skills/Supportive therapy
Dynamic issues addressed/Encourage medication compliance/Supported coping skills/Supportive therapy/Treatment compliance encouraged
Dynamic issues addressed/Encourage medication compliance/Supported coping skills/Supportive therapy/Treatment compliance encouraged

## 2022-08-12 NOTE — BH PSYCHOLOGY - CLINICIAN PSYCHOTHERAPY NOTE - NSBHPSYCHOLPARTICIPCOMMENT_PSY_A_CORE FT
Questions answered slowly and quietly; repetitive thoughts' not all questions were answered; Movement of body back and forth and constant rubbing of eyes. 
Sharona was unable to speak much, rubbed her eyes often, had poor eye contact and appeared sadder than usual, given that she was supposed to be discharged sooner. Sharona was crying throughout the time and inconsolable. 
Was found crying and would not speak
Would answer one or two questions very slowly and quietly; other questions would not be answered and inability to speak. Movement of body back and forth and constant rubbing of eyes. 
Questions answered slowly and quietly; not all questions were answered and Sharona spoke lesser as session went on; body was more still and less rubbing of eyes than previous sessions.

## 2022-08-15 DIAGNOSIS — F06.1 CATATONIC DISORDER DUE TO KNOWN PHYSIOLOGICAL CONDITION: ICD-10-CM

## 2022-08-15 PROCEDURE — 99232 SBSQ HOSP IP/OBS MODERATE 35: CPT

## 2022-08-15 RX ORDER — HALOPERIDOL DECANOATE 100 MG/ML
2 INJECTION INTRAMUSCULAR EVERY 6 HOURS
Refills: 0 | Status: DISCONTINUED | OUTPATIENT
Start: 2022-08-15 | End: 2022-08-22

## 2022-08-15 RX ORDER — HALOPERIDOL DECANOATE 100 MG/ML
2 INJECTION INTRAMUSCULAR ONCE
Refills: 0 | Status: COMPLETED | OUTPATIENT
Start: 2022-08-15 | End: 2022-08-15

## 2022-08-15 RX ADMIN — Medication 1 MILLIGRAM(S): at 15:24

## 2022-08-15 RX ADMIN — HALOPERIDOL DECANOATE 2 MILLIGRAM(S): 100 INJECTION INTRAMUSCULAR at 15:24

## 2022-08-15 NOTE — BH INPATIENT PSYCHIATRY PROGRESS NOTE - NSBHASSESSSUMMFT_PSY_ALL_CORE
Sharona Kumar is a 21 yo female with PMH asthma and PPH MDD, ODD, and cannabis use disorder who was bib mother to [facility] for concerns that pt was not eating, drinking, or acting like herself. Patient seen today where she demonstrates findings of subjective and objectively depressed affect, anhedonia, poor eating, sleeping, concentration, energy, and passive SI over a few weeks' duration. Patient also has some psychotic features including potential internal preoccupation and reports of AH. Findings consistent with major depressive episode with psychotic features. Patient considered to be of potential risk to herself due to depression and suicidality as well as unable to care for herself as she is not tending to her basic needs, requiring admission and treatment.     7/20: Pt more open today, speaks about intimate topics with writer, voices concerns of pregnancy. Denies depressed state and rejects medications but eventually agrees to keep adhering to meds with much encouragement. PO intake increasing. Pt denies SI.     7/25: Pt suffering panic attacks on the unit. Childlike behaviors noted, pt remains anxious and appears depressed. Ativan PRN given with much resistance from the patient.     7/26: Pt calmer, still appearing anxious and depressed. Reports paranoia, opens up more about recent stressors and traumas. Agreed to continue Prozac and started Zyprexa last night with no AE reported. Eating and drinking slightly more.     7/27: Pt anxious, tearful, cannot participate in PCL 5. Disorganized/illogical with persecutory and somatic delusional content.     7/28: Patient noted to be very tearful, difficult to engage in meaningful discussion outside of topic of discharge, had bm yesterday. Orders for constipation in place. Medicine consult continues to follow. Tolerating increase in zyprexa without issue.    7/29: Pt anxious and fearful appearing. Cannot verbalize concerns or converse with writer. Partially adherent to medications.     8/1: Pt remains quite anxious. Poorly related. Paranoia, somatic preoccupations present. Team met with mother today, pending decision of further inpatient tx vs discharge.     8/2: Pt anxious with paranoid ideation and somatic delusions present. Refuses to take medications. Work up returned normal.    8/3: Patient distressed, predominating paranoia, somatically preoccupied. Refusing medications, informed of TOO process.     8/4 Appears psychotic, distressed and confused, seen giggling and smiling to self. Took prn po haldol 5mg/ativan 2mg, poor hygiene- malodorous  8/8 Somatically preoccupied, selectively taking medications  8/9 Refusing medications, appears fearful, may be responding to internal stimuli  8/10 Continues to refuse medications, largely mute during interview. Should consider TOO.  8/11 Almost completely nonverbal; unable to follow commands; little to no PO intake. Eventually tearfully voiced concerns over "this thing" in her abdomen.   8/12 Not verbally interacting with staff today, yesterday she told staff that he had something in her stomach that was causing her to become weak, would not allow abdominal exam    Plan:  #MDE with psychotic features  -Prozac 20 mg for depressive and anxious features   -Zydis 5 mg for psychosis/paranoia, increase as needed   -Encourage pt to attend groups and adhere to medications   -Safety planning prior to discharge   -Ativan PRN 2 mg for anxiety/panic    Current medications acetaminophen     Tablet .. 650 milliGRAM(s) Oral every 6 hours PRN  aluminum hydroxide/magnesium hydroxide/simethicone Suspension 30 milliLiter(s) Oral every 4 hours PRN  diphenhydrAMINE 50 milliGRAM(s) Oral every 6 hours PRN  FLUoxetine Solution 20 milliGRAM(s) Oral daily  haloperidol     Tablet 5 milliGRAM(s) Oral every 6 hours PRN  hydrOXYzine hydrochloride 25 milliGRAM(s) Oral every 6 hours PRN  ibuprofen  Tablet. 200 milliGRAM(s) Oral every 6 hours PRN  ibuprofen  Tablet. 400 milliGRAM(s) Oral every 6 hours PRN  LORazepam     Tablet 2 milliGRAM(s) Oral every 6 hours PRN  magnesium hydroxide Suspension 30 milliLiter(s) Oral daily PRN  OLANZapine Disintegrating Tablet 5 milliGRAM(s) Oral at bedtime  polyethylene glycol 3350 17 Gram(s) Oral two times a day  senna 2 Tablet(s) Oral at bedtime  traZODone 50 milliGRAM(s) Oral at bedtime PRN

## 2022-08-15 NOTE — BH INPATIENT PSYCHIATRY PROGRESS NOTE - NSTXDEPRESGOALOTHER_PSY_ALL_CORE
Pt. will participate in groups and milieu structure of the unit.
Pt. will participate in groups and milieu structure of the unit.
Pt. will participate in groups and milieu tx structure of unit
Pt. will participate in groups and milieu structure of the unit
Pt. will participate in groups and milieu structure of the unit.
Pt. will participate in groups and milieu tx structure of unit
Pt. will participate in groups and milieu structure of the unit.
Pt. will participate in groups and milieu structure of the unit
Pt. will participate in groups and milieu structure of the unit.
Pt. will participate in groups and milieu structure of the unit
Pt. will participate in groups and milieu tx structure of unit
Pt. will participate in groups and milieu tx structure of unit
Pt. will participate in groups and milieu structure of the unit.
Pt. will participate in groups and milieu tx structure of unit
Pt. will participate in groups and milieu structure of the unit.
Pt. will participate in groups and milieu tx structure of unit
Pt. will participate in groups and milieu structure of the unit

## 2022-08-15 NOTE — BH INPATIENT PSYCHIATRY PROGRESS NOTE - CURRENT MEDICATION
MEDICATIONS  (STANDING):  FLUoxetine Solution 20 milliGRAM(s) Oral daily  haloperidol     Tablet 2 milliGRAM(s) Oral every 12 hours  LORazepam     Tablet 1 milliGRAM(s) Oral every 12 hours  OLANZapine Disintegrating Tablet 5 milliGRAM(s) Oral at bedtime  polyethylene glycol 3350 17 Gram(s) Oral two times a day  senna 2 Tablet(s) Oral at bedtime    MEDICATIONS  (PRN):  acetaminophen     Tablet .. 650 milliGRAM(s) Oral every 6 hours PRN Mild Pain (1-3)  aluminum hydroxide/magnesium hydroxide/simethicone Suspension 30 milliLiter(s) Oral every 4 hours PRN Dyspepsia  diphenhydrAMINE 50 milliGRAM(s) Oral every 6 hours PRN agitation  haloperidol     Tablet 2 milliGRAM(s) Oral every 6 hours PRN agitation  hydrOXYzine hydrochloride 25 milliGRAM(s) Oral every 6 hours PRN insomnia/anxiety  ibuprofen  Tablet. 400 milliGRAM(s) Oral every 6 hours PRN headaches  ibuprofen  Tablet. 200 milliGRAM(s) Oral every 6 hours PRN Moderate Pain (4 - 6)  magnesium hydroxide Suspension 30 milliLiter(s) Oral daily PRN Constipation. 2nd line  traZODone 50 milliGRAM(s) Oral at bedtime PRN insomnia

## 2022-08-15 NOTE — BH INPATIENT PSYCHIATRY PROGRESS NOTE - NSBHCHARTREVIEWVS_PSY_A_CORE FT
Vital Signs Last 24 Hrs  T(C): 37.1 (08-15-22 @ 09:00), Max: 37.1 (08-15-22 @ 09:00)  T(F): 98.7 (08-15-22 @ 09:00), Max: 98.7 (08-15-22 @ 09:00)  HR: 113 (08-15-22 @ 09:00) (113 - 113)  BP: 111/74 (08-15-22 @ 09:00) (111/74 - 111/74)  BP(mean): --  RR: 18 (08-15-22 @ 09:00) (18 - 18)  SpO2: 98% (08-15-22 @ 09:00) (98% - 98%)

## 2022-08-15 NOTE — BH INPATIENT PSYCHIATRY PROGRESS NOTE - NSBHFUPINTERVALHXFT_PSY_A_CORE
Pt unable to speak with me today. More catatonic today. Ordering CBC and CMP for tomorrow morning as it is unclear how good her PO intake has been. Ordered haldol 2 mg and ativan 2 mg IM and/or PI but pt refusing. Pt has been refusing all meds for about a week. Will pursue treatment over objection as patient seems to be doing much worse. Pt did have a visit with her mother today.

## 2022-08-15 NOTE — BH INPATIENT PSYCHIATRY PROGRESS NOTE - PRN MEDS
MEDICATIONS  (PRN):  acetaminophen     Tablet .. 650 milliGRAM(s) Oral every 6 hours PRN Mild Pain (1-3)  aluminum hydroxide/magnesium hydroxide/simethicone Suspension 30 milliLiter(s) Oral every 4 hours PRN Dyspepsia  diphenhydrAMINE 50 milliGRAM(s) Oral every 6 hours PRN agitation  haloperidol     Tablet 2 milliGRAM(s) Oral every 6 hours PRN agitation  hydrOXYzine hydrochloride 25 milliGRAM(s) Oral every 6 hours PRN insomnia/anxiety  ibuprofen  Tablet. 400 milliGRAM(s) Oral every 6 hours PRN headaches  ibuprofen  Tablet. 200 milliGRAM(s) Oral every 6 hours PRN Moderate Pain (4 - 6)  magnesium hydroxide Suspension 30 milliLiter(s) Oral daily PRN Constipation. 2nd line  traZODone 50 milliGRAM(s) Oral at bedtime PRN insomnia

## 2022-08-15 NOTE — BH INPATIENT PSYCHIATRY PROGRESS NOTE - NSBHMETABOLIC_PSY_ALL_CORE_FT
BMI: BMI (kg/m2): 22.9 (07-17-22 @ 22:37)  HbA1c: A1C with Estimated Average Glucose Result: 4.7 % (07-18-22 @ 08:41)    Glucose:   BP: 111/74 (08-15-22 @ 09:00) (100/67 - 133/82)  Lipid Panel: Date/Time: 07-18-22 @ 08:41  Cholesterol, Serum: 147  Direct LDL: --  HDL Cholesterol, Serum: 47  Total Cholesterol/HDL Ration Measurement: --  Triglycerides, Serum: 57

## 2022-08-16 PROCEDURE — 99232 SBSQ HOSP IP/OBS MODERATE 35: CPT

## 2022-08-16 RX ADMIN — Medication 1 MILLIGRAM(S): at 21:33

## 2022-08-16 NOTE — BH INPATIENT PSYCHIATRY PROGRESS NOTE - NSBHFUPINTERVALHXFT_PSY_A_CORE
Pt unable to speak with me today. More catatonic today. Ordering CBC and CMP for tomorrow morning as it is unclear how good her PO intake has been. Ordered haldol 2 mg and ativan 2 mg IM and/or PI but pt refusing. Pt has been refusing all meds for about a week. Will pursue treatment over objection as patient seems to be doing much worse. Pt did have a visit with her mother today.  Pt much improved after getting some ativan yesterday. She agreed to take ativan 1 mg q12h and prozac 20 mg daily on a consistent basis. I explained that if she did not we would be pursuing treatment over objection. Scheduled to meet with patient's mother tomorrow at 2pm. Pt even attended a group today. Explained catatonia to her.

## 2022-08-16 NOTE — BH TREATMENT PLAN - NSTXPLANSTARTDATE_PSY_ALL_CORE
09-Aug-2022 10:07
16-Aug-2022 17:58
18-Jul-2022 15:00
20-Jul-2022 10:00
03-Aug-2022 10:00
04-Aug-2022 14:53
26-Jul-2022 10:00

## 2022-08-16 NOTE — BH TREATMENT PLAN - NSTXPSYCHOINTERMD_PSY_ALL_CORE
Individual psychotherapy with patient to assist pt in understanding AVH and other psychotic symptoms. Zyprexa initiated 7/25/22. TOO pending.
Individual psychotherapy with patient to assist pt in understanding AVH and other psychotic symptoms. Zyprexa initiated 7/25/22. 
Individual psychotherapy with patient to assist pt in understanding AVH and other psychotic symptoms. Will encourage Abilify use. 
Individual psychotherapy with patient to assist pt in understanding AVH and other psychotic symptoms. Ativan and prozac for catatonia/anxiety and depression/anxiety respectively . TOO if pt continues not to take meds. 
Individual psychotherapy with patient to assist pt in understanding AVH and other psychotic symptoms. Ativan and prozac for catatonia/anxiety and depression/anxiety respectively . TOO if pt continues not to take meds. 
Individual psychotherapy with patient to assist pt in understanding AVH and other psychotic symptoms. Zyprexa initiated 7/25/22. TOO pending.
EOAE (evoked otoacoustic emission)
Individual psychotherapy with patient to assist pt in understanding AVH and other psychotic symptoms. Zyprexa initiated 7/25/22.

## 2022-08-16 NOTE — BH TREATMENT PLAN - NSTXPLANTHERAPYSESSIONSFT_PSY_ALL_CORE
07-22-22  Type of therapy: Psychoeducation  Type of session: Group  Level of patient participation: Attentive,Quiet  Duration of participation: 45 minutes  Therapy conducted by: Social work  Therapy Summary: Patient attended Wellness and Self-Management Group. Topic: Psychiatric Symptoms. Issues discussed include substance use as coping, managing familial expectations, and social support in the absence of family.    Patient only spoke when prompted, but was attentive throughout.    07-25-22  Type of therapy: Other,Games group  Type of session: Group  Level of patient participation: Participated with encouragement  Duration of participation: 45 minutes  Therapy conducted by: Psych rehab  Therapy Summary: Pt. participated in a "wheel of fortune" type game group; pt. demonstrated improved relatedness and had a brighter affect than previous weeks  
  08-15-22  Type of therapy: Creative arts therapy  Type of session: Group  Level of patient participation: Not engaged,Quiet  --  Therapy conducted by: Psych rehab  Therapy Summary: Pt. attends groups but does not typically participate in the offered activities. Pt. is internally preoccupied and selectively mute. Pt. participated minimally in a word association game in which she quietly read aloud a few words that were written on playing cards.  
  08-01-22  Type of therapy: Creative arts therapy  Type of session: Group  Level of patient participation: Participated with encouragement  Duration of participation: 60 minutes  Therapy conducted by: Psych rehab  Therapy Summary: In both art therapy and open studio groups, pt. demonstrated an improved demeanor and brighter affect than is typical; pt.'s voice was also clearer and far more audible.  
  08-04-22  Type of therapy: Other,Poetry  Type of session: Group  Level of patient participation: Quiet  Duration of participation: Less than 15 minutes  Therapy conducted by: Psych rehab  Therapy Summary: Pt joined the group at the end, when peers were done sharing poetry and discussing the process. Pt was invited to respond to group dialogue but Pt remained mute during this time. When group ended, writer attemped to check in with Pt but she remained mute and appeared to be mildly trembling. Pt was reminded she could seek out support if and when she is ready for it.    08-08-22  Type of therapy: Creative arts therapy  Type of session: Group  Level of patient participation: Participates,Quiet  Duration of participation: 45 minutes  Therapy conducted by: Psych rehab  Therapy Summary: Pt. continues to attend groups regularly; pt. is often quiet and at times appears to be internally preoccupied; pt. does interact once engaged but does not initiate.  
  08-01-22  Type of therapy: Creative arts therapy  Type of session: Group  Level of patient participation: Participated with encouragement  Duration of participation: 60 minutes  Therapy conducted by: Psych rehab  Therapy Summary: In both art therapy and open studio groups, pt. demonstrated an improved demeanor and brighter affect than is typical; pt.'s voice was also clearer and far more audible.

## 2022-08-16 NOTE — BH TREATMENT PLAN - NSTXSLFCREGOAL_PSY_ALL_CORE
Will perform ADLs without assistance/prompts daily

## 2022-08-16 NOTE — BH TREATMENT PLAN - NSCMSPTSTRENGTHS_PSY_ALL_CORE
[place]/spirituality/Intact employment/Intact family/Physically healthy/Supportive family
[place]/spirituality/Future/goal oriented/Intact employment/Intact family/Leisure interest/Physically healthy/Supportive family
Assertive/Future/goal oriented/Intact employment/Supportive family
[place]/spirituality/Intact employment/Intact family/Leisure interest/Physically healthy/Supportive family
[place]/spirituality/Intact family/Leisure interest/Physically healthy/Supportive family

## 2022-08-16 NOTE — BH TREATMENT PLAN - NSBHPRIMARYDX_PSY_ALL_CORE
Major depressive disorder with psychotic features    

## 2022-08-16 NOTE — BH TREATMENT PLAN - NSTXMEDICGOAL_PSY_ALL_CORE
Take all medications as prescribed
Be able to describe the benefit of medication/treatment
Take all medications as prescribed

## 2022-08-16 NOTE — BH INPATIENT PSYCHIATRY PROGRESS NOTE - NSBHCHARTREVIEWVS_PSY_A_CORE FT
Vital Signs Last 24 Hrs  T(C): 36.5 (08-16-22 @ 16:52), Max: 37.2 (08-16-22 @ 09:00)  T(F): 97.7 (08-16-22 @ 16:52), Max: 98.9 (08-16-22 @ 09:00)  HR: 83 (08-16-22 @ 16:52) (83 - 110)  BP: 114/73 (08-16-22 @ 16:52) (106/71 - 114/73)  BP(mean): --  RR: 18 (08-16-22 @ 16:52) (18 - 18)  SpO2: 99% (08-16-22 @ 16:52) (98% - 99%)

## 2022-08-16 NOTE — BH TREATMENT PLAN - NSTXPSYCHOINTERRN_PSY_ALL_CORE
Monitor patient mood, behavior. Patient will be able to verbalize and report any hallucinations. Patient will engage in group therapy/sessions.
Encourage patient to adhere with medications and treatment. Encourage patient to attend groups and verbalize her feelings and concerns. Help patient identify coping strategies that have worked in the past and support the use of these strategies.
Encourage patient to adhere with medications and treatment. Encourage patient to attend groups and verbalize his feelings and concerns. Help patient identify coping strategies that have worked in the past and support the use of these strategies.
Encourage pt to adhere to medication regimen. Encourage pt to attend groups. Establish therapeutic relationship. Give pt opportunity to share feelings.
Encourage patient to adhere with medications and treatment. Encourage patient to attend groups and verbalize her feelings and concerns. Help patient identify coping strategies that have worked in the past and support the use of these strategies.
Encourage pt to adhere to medication regimen. Encourage pt to attend groups. Establish therapeutic relationship. Give pt opportunity to share feelings.
Will monitor patients mood/behavior and monitor for any psychotic symptoms or features. Encouraged patient to verbalize psychotic symptoms to staff

## 2022-08-16 NOTE — BH TREATMENT PLAN - NSTXMEDICINTERMD_PSY_ALL_CORE
psychopharm management x 15 min daily, enlist mother's help in investing pt in treatment plan. 

## 2022-08-16 NOTE — BH INPATIENT PSYCHIATRY PROGRESS NOTE - NSBHMETABOLIC_PSY_ALL_CORE_FT
BMI: BMI (kg/m2): 22.9 (07-17-22 @ 22:37)  HbA1c: A1C with Estimated Average Glucose Result: 4.7 % (07-18-22 @ 08:41)    Glucose:   BP: 114/73 (08-16-22 @ 16:52) (100/67 - 133/82)  Lipid Panel: Date/Time: 07-18-22 @ 08:41  Cholesterol, Serum: 147  Direct LDL: --  HDL Cholesterol, Serum: 47  Total Cholesterol/HDL Ration Measurement: --  Triglycerides, Serum: 57

## 2022-08-16 NOTE — BH TREATMENT PLAN - NSTXDEPRESINTERRN_PSY_ALL_CORE
Encourage pt to adhere to medication regimen. Encourage pt to attend groups. Establish therapeutic relationship. Give pt opportunity to share feelings.
Encourage patient to adhere with medications and treatment. Encourage patient to attend groups and verbalize her feelings and concerns. Help patient identify coping strategies that have worked in the past and support the use of these strategies.
Encourage pt to adhere to medication regimen. Encourage pt to attend groups. Establish therapeutic relationship. Give pt opportunity to share feelings.
Encourage pt to adhere to medication regimen. Encourage pt to attend groups. Establish therapeutic relationship. Give pt opportunity to share feelings.
Encourage patient to adhere with medications and treatment. Encourage patient to attend groups and verbalize his feelings and concerns. Help patient identify coping strategies that have worked in the past and support the use of these strategies.
Encourage patient to adhere with medications and treatment. Encourage patient to attend groups and verbalize her feelings and concerns. Help patient identify coping strategies that have worked in the past and support the use of these strategies.
Monitor behavior and mood, patient interaction with peers, patient will attend group therapy.

## 2022-08-16 NOTE — BH INPATIENT PSYCHIATRY PROGRESS NOTE - NSBHASSESSSUMMFT_PSY_ALL_CORE
Sharona Kumar is a 23 yo female with PMH asthma and PPH MDD, ODD, and cannabis use disorder who was bib mother to [facility] for concerns that pt was not eating, drinking, or acting like herself. Patient seen today where she demonstrates findings of subjective and objectively depressed affect, anhedonia, poor eating, sleeping, concentration, energy, and passive SI over a few weeks' duration. Patient also has some psychotic features including potential internal preoccupation and reports of AH. Findings consistent with major depressive episode with psychotic features. Patient considered to be of potential risk to herself due to depression and suicidality as well as unable to care for herself as she is not tending to her basic needs, requiring admission and treatment.     7/20: Pt more open today, speaks about intimate topics with writer, voices concerns of pregnancy. Denies depressed state and rejects medications but eventually agrees to keep adhering to meds with much encouragement. PO intake increasing. Pt denies SI.     7/25: Pt suffering panic attacks on the unit. Childlike behaviors noted, pt remains anxious and appears depressed. Ativan PRN given with much resistance from the patient.     7/26: Pt calmer, still appearing anxious and depressed. Reports paranoia, opens up more about recent stressors and traumas. Agreed to continue Prozac and started Zyprexa last night with no AE reported. Eating and drinking slightly more.     7/27: Pt anxious, tearful, cannot participate in PCL 5. Disorganized/illogical with persecutory and somatic delusional content.     7/28: Patient noted to be very tearful, difficult to engage in meaningful discussion outside of topic of discharge, had bm yesterday. Orders for constipation in place. Medicine consult continues to follow. Tolerating increase in zyprexa without issue.    7/29: Pt anxious and fearful appearing. Cannot verbalize concerns or converse with writer. Partially adherent to medications.     8/1: Pt remains quite anxious. Poorly related. Paranoia, somatic preoccupations present. Team met with mother today, pending decision of further inpatient tx vs discharge.     8/2: Pt anxious with paranoid ideation and somatic delusions present. Refuses to take medications. Work up returned normal.    8/3: Patient distressed, predominating paranoia, somatically preoccupied. Refusing medications, informed of TOO process.     8/4 Appears psychotic, distressed and confused, seen giggling and smiling to self. Took prn po haldol 5mg/ativan 2mg, poor hygiene- malodorous  8/8 Somatically preoccupied, selectively taking medications  8/9 Refusing medications, appears fearful, may be responding to internal stimuli  8/10 Continues to refuse medications, largely mute during interview. Should consider TOO.  8/11 Almost completely nonverbal; unable to follow commands; little to no PO intake. Eventually tearfully voiced concerns over "this thing" in her abdomen.   8/12 Not verbally interacting with staff today, yesterday she told staff that he had something in her stomach that was causing her to become weak, would not allow abdominal exam    Plan:  #MDE with psychotic features  -Prozac 20 mg for depressive and anxious features   -Zydis 5 mg for psychosis/paranoia, increase as needed   -Encourage pt to attend groups and adhere to medications   -Safety planning prior to discharge   -Ativan PRN 2 mg for anxiety/panic    Current medications acetaminophen     Tablet .. 650 milliGRAM(s) Oral every 6 hours PRN  aluminum hydroxide/magnesium hydroxide/simethicone Suspension 30 milliLiter(s) Oral every 4 hours PRN  diphenhydrAMINE 50 milliGRAM(s) Oral every 6 hours PRN  FLUoxetine Solution 20 milliGRAM(s) Oral daily  haloperidol     Tablet 5 milliGRAM(s) Oral every 6 hours PRN  hydrOXYzine hydrochloride 25 milliGRAM(s) Oral every 6 hours PRN  ibuprofen  Tablet. 200 milliGRAM(s) Oral every 6 hours PRN  ibuprofen  Tablet. 400 milliGRAM(s) Oral every 6 hours PRN  LORazepam     Tablet 2 milliGRAM(s) Oral every 6 hours PRN  magnesium hydroxide Suspension 30 milliLiter(s) Oral daily PRN  OLANZapine Disintegrating Tablet 5 milliGRAM(s) Oral at bedtime  polyethylene glycol 3350 17 Gram(s) Oral two times a day  senna 2 Tablet(s) Oral at bedtime  traZODone 50 milliGRAM(s) Oral at bedtime PRN

## 2022-08-16 NOTE — BH TREATMENT PLAN - NSTXDEPRESINTERMD_PSY_ALL_CORE
Individual psychotherapy, Effexor initiated. Pt educated on use of Effexor. Thus far pt attends group activities. 
Individual psychotherapy, Prozac. Thus far pt attends group activities. Patient speaking with psychologist more. Will administer PCL-5 once pt is further stabilized and able to participate. 
Individual psychotherapy, Prozac. Thus far pt attends group activities. Patient speaking with psychologist more. Will administer PCL-5.
Individual psychotherapy, Prozac. Thus far pt attends group activities. Patient speaking with psychologist more. Will administer PCL-5 once pt is further stabilized and able to participate.

## 2022-08-16 NOTE — BH TREATMENT PLAN - NSTXSLFCREINTERMD_PSY_ALL_CORE
ativan, prozac. Enlist mother's help in investing patient in treatment plan. 

## 2022-08-16 NOTE — BH TREATMENT PLAN - NSTXANXINTERMD_PSY_ALL_CORE
Educate pt on management of anxiety and panic attack treatment. Ativan PRN. Prozac for anxiety management and Zyprexa initiated for paranoia that appears to contribute to anxiety. Haldol 2mg bid initiated. PRNs available as needed
Educate pt on management of anxiety and panic attack treatment. Ativan PRN. Prozac for anxiety management and Zyprexa initiated for paranoia that appears to contribute to anxiety. PRNs available as needed
Educate pt on management of anxiety and panic attack treatment. Ativan PRN. Prozac for anxiety management and Zyprexa initiated for paranoia that appears to contribute to anxiety. 
Educate pt on management of anxiety and panic attack treatment. Ativan PRN. Prozac for anxiety management and Zyprexa initiated for paranoia that appears to contribute to anxiety. 
Educate pt on management of anxiety and panic attack treatment. Ativan PRN. Prozac for anxiety management and Zyprexa initiated for paranoia that appears to contribute to anxiety. Haldol 2mg bid initiated. PRNs available as needed
Educate pt on management of anxiety and panic attack treatment. Ativan PRN. Prozac for anxiety management and Zyprexa initiated for paranoia that appears to contribute to anxiety. Haldol 2mg bid initiated. PRNs available as needed

## 2022-08-16 NOTE — BH TREATMENT PLAN - NSTXPATIENTPARTICIPATE_PSY_ALL_CORE
No, patient unwilling to participate
Patient participated in identification of needs/problems/goals for treatment
Patient participated in identification of needs/problems/goals for treatment
No, patient unwilling to participate
Patient participated in identification of needs/problems/goals for treatment

## 2022-08-16 NOTE — BH TREATMENT PLAN - NSTXDEPRESINTERPR_PSY_ALL_CORE
Pt. will continue to be invited and encouraged to attend all offered groups.
Pt. will be invited and encouraged to join all offered groups
Pt. will continue to be invited and encouraged to attend all offered groups.
Pt. will continue to be invited and encouraged to attend all offered groups.
Pt. will be invited and encouraged to join all offered groups
Pt. will continue to be invited to all offered groups
Pt. will continue to be invited and encouraged to attend all offered groups.

## 2022-08-16 NOTE — BH INPATIENT PSYCHIATRY PROGRESS NOTE - NSBHFUPINTERVALCCFT_PSY_A_CORE
Pt seems more catatonic. Ongoing treatment of depression and psychosis Ongoing treatment of depression with psychotic and catatonic features

## 2022-08-16 NOTE — BH INPATIENT PSYCHIATRY PROGRESS NOTE - CURRENT MEDICATION
MEDICATIONS  (STANDING):  FLUoxetine Solution 20 milliGRAM(s) Oral daily  haloperidol     Tablet 2 milliGRAM(s) Oral every 12 hours  LORazepam     Tablet 1 milliGRAM(s) Oral every 12 hours  polyethylene glycol 3350 17 Gram(s) Oral two times a day  senna 2 Tablet(s) Oral at bedtime    MEDICATIONS  (PRN):  acetaminophen     Tablet .. 650 milliGRAM(s) Oral every 6 hours PRN Mild Pain (1-3)  aluminum hydroxide/magnesium hydroxide/simethicone Suspension 30 milliLiter(s) Oral every 4 hours PRN Dyspepsia  diphenhydrAMINE 50 milliGRAM(s) Oral every 6 hours PRN agitation  haloperidol     Tablet 2 milliGRAM(s) Oral every 6 hours PRN agitation  hydrOXYzine hydrochloride 25 milliGRAM(s) Oral every 6 hours PRN insomnia/anxiety  ibuprofen  Tablet. 400 milliGRAM(s) Oral every 6 hours PRN headaches  ibuprofen  Tablet. 200 milliGRAM(s) Oral every 6 hours PRN Moderate Pain (4 - 6)  magnesium hydroxide Suspension 30 milliLiter(s) Oral daily PRN Constipation. 2nd line  traZODone 50 milliGRAM(s) Oral at bedtime PRN insomnia

## 2022-08-17 PROCEDURE — 99232 SBSQ HOSP IP/OBS MODERATE 35: CPT

## 2022-08-17 RX ADMIN — Medication 20 MILLIGRAM(S): at 10:37

## 2022-08-17 RX ADMIN — Medication 200 MILLIGRAM(S): at 17:30

## 2022-08-17 RX ADMIN — Medication 200 MILLIGRAM(S): at 16:34

## 2022-08-17 RX ADMIN — Medication 1 MILLIGRAM(S): at 21:20

## 2022-08-17 RX ADMIN — Medication 1 MILLIGRAM(S): at 10:37

## 2022-08-17 RX ADMIN — POLYETHYLENE GLYCOL 3350 17 GRAM(S): 17 POWDER, FOR SOLUTION ORAL at 10:37

## 2022-08-17 NOTE — BH INPATIENT PSYCHIATRY PROGRESS NOTE - NSBHCHARTREVIEWVS_PSY_A_CORE FT
Vital Signs Last 24 Hrs  T(C): 37 (08-17-22 @ 16:47), Max: 37 (08-17-22 @ 16:47)  T(F): 98.6 (08-17-22 @ 16:47), Max: 98.6 (08-17-22 @ 16:47)  HR: 86 (08-17-22 @ 16:47) (86 - 91)  BP: 105/71 (08-17-22 @ 16:47) (105/71 - 113/75)  BP(mean): --  RR: 18 (08-17-22 @ 16:47) (18 - 18)  SpO2: 100% (08-17-22 @ 16:47) (99% - 100%)

## 2022-08-17 NOTE — BH INPATIENT PSYCHIATRY PROGRESS NOTE - NSBHASSESSSUMMFT_PSY_ALL_CORE
Sharona Kumar is a 23 yo female with PMH asthma and PPH MDD, ODD, and cannabis use disorder who was bib mother to [facility] for concerns that pt was not eating, drinking, or acting like herself. Patient seen today where she demonstrates findings of subjective and objectively depressed affect, anhedonia, poor eating, sleeping, concentration, energy, and passive SI over a few weeks' duration. Patient also has some psychotic features including potential internal preoccupation and reports of AH. Findings consistent with major depressive episode with psychotic features. Patient considered to be of potential risk to herself due to depression and suicidality as well as unable to care for herself as she is not tending to her basic needs, requiring admission and treatment.     7/20: Pt more open today, speaks about intimate topics with writer, voices concerns of pregnancy. Denies depressed state and rejects medications but eventually agrees to keep adhering to meds with much encouragement. PO intake increasing. Pt denies SI.     7/25: Pt suffering panic attacks on the unit. Childlike behaviors noted, pt remains anxious and appears depressed. Ativan PRN given with much resistance from the patient.     7/26: Pt calmer, still appearing anxious and depressed. Reports paranoia, opens up more about recent stressors and traumas. Agreed to continue Prozac and started Zyprexa last night with no AE reported. Eating and drinking slightly more.     7/27: Pt anxious, tearful, cannot participate in PCL 5. Disorganized/illogical with persecutory and somatic delusional content.     7/28: Patient noted to be very tearful, difficult to engage in meaningful discussion outside of topic of discharge, had bm yesterday. Orders for constipation in place. Medicine consult continues to follow. Tolerating increase in zyprexa without issue.    7/29: Pt anxious and fearful appearing. Cannot verbalize concerns or converse with writer. Partially adherent to medications.     8/1: Pt remains quite anxious. Poorly related. Paranoia, somatic preoccupations present. Team met with mother today, pending decision of further inpatient tx vs discharge.     8/2: Pt anxious with paranoid ideation and somatic delusions present. Refuses to take medications. Work up returned normal.    8/3: Patient distressed, predominating paranoia, somatically preoccupied. Refusing medications, informed of TOO process.     8/4 Appears psychotic, distressed and confused, seen giggling and smiling to self. Took prn po haldol 5mg/ativan 2mg, poor hygiene- malodorous  8/8 Somatically preoccupied, selectively taking medications  8/9 Refusing medications, appears fearful, may be responding to internal stimuli  8/10 Continues to refuse medications, largely mute during interview. Should consider TOO.  8/11 Almost completely nonverbal; unable to follow commands; little to no PO intake. Eventually tearfully voiced concerns over "this thing" in her abdomen.   8/12 Not verbally interacting with staff today, yesterday she told staff that he had something in her stomach that was causing her to become weak, would not allow abdominal exam    Plan:  #MDE with psychotic features  -Prozac 20 mg for depressive and anxious features   -Zydis 5 mg for psychosis/paranoia, increase as needed   -Encourage pt to attend groups and adhere to medications   -Safety planning prior to discharge   -Ativan PRN 2 mg for anxiety/panic    Current medications acetaminophen     Tablet .. 650 milliGRAM(s) Oral every 6 hours PRN  aluminum hydroxide/magnesium hydroxide/simethicone Suspension 30 milliLiter(s) Oral every 4 hours PRN  diphenhydrAMINE 50 milliGRAM(s) Oral every 6 hours PRN  FLUoxetine Solution 20 milliGRAM(s) Oral daily  haloperidol     Tablet 5 milliGRAM(s) Oral every 6 hours PRN  hydrOXYzine hydrochloride 25 milliGRAM(s) Oral every 6 hours PRN  ibuprofen  Tablet. 200 milliGRAM(s) Oral every 6 hours PRN  ibuprofen  Tablet. 400 milliGRAM(s) Oral every 6 hours PRN  LORazepam     Tablet 2 milliGRAM(s) Oral every 6 hours PRN  magnesium hydroxide Suspension 30 milliLiter(s) Oral daily PRN  OLANZapine Disintegrating Tablet 5 milliGRAM(s) Oral at bedtime  polyethylene glycol 3350 17 Gram(s) Oral two times a day  senna 2 Tablet(s) Oral at bedtime  traZODone 50 milliGRAM(s) Oral at bedtime PRN

## 2022-08-17 NOTE — BH INPATIENT PSYCHIATRY PROGRESS NOTE - PRN MEDS
MEDICATIONS  (PRN):  acetaminophen     Tablet .. 650 milliGRAM(s) Oral every 6 hours PRN Mild Pain (1-3)  aluminum hydroxide/magnesium hydroxide/simethicone Suspension 30 milliLiter(s) Oral every 4 hours PRN Dyspepsia  diphenhydrAMINE 50 milliGRAM(s) Oral every 6 hours PRN agitation  haloperidol     Tablet 2 milliGRAM(s) Oral every 6 hours PRN agitation  hydrOXYzine hydrochloride 25 milliGRAM(s) Oral every 6 hours PRN insomnia/anxiety  ibuprofen  Tablet. 200 milliGRAM(s) Oral every 6 hours PRN Moderate Pain (4 - 6)  ibuprofen  Tablet. 400 milliGRAM(s) Oral every 6 hours PRN headaches  magnesium hydroxide Suspension 30 milliLiter(s) Oral daily PRN Constipation. 2nd line  traZODone 50 milliGRAM(s) Oral at bedtime PRN insomnia

## 2022-08-17 NOTE — BH INPATIENT PSYCHIATRY PROGRESS NOTE - NSBHFUPINTERVALHXFT_PSY_A_CORE
Met with mother and discussed meaning of catatonia with her. Discussed plan for pt to continue taking prozac 20 mg daily and ativan 1 mg q12h for depression, anxiety, and catatonia. Discussed how I wish she would also take an antipsychotic but pt only agreeing to two meds at this time. Pt and mother state she will continue taking prozac and ativan after discharge,

## 2022-08-17 NOTE — BH INPATIENT PSYCHIATRY PROGRESS NOTE - NSBHMETABOLIC_PSY_ALL_CORE_FT
BMI: BMI (kg/m2): 22.9 (07-17-22 @ 22:37)  HbA1c: A1C with Estimated Average Glucose Result: 4.7 % (07-18-22 @ 08:41)    Glucose:   BP: 105/71 (08-17-22 @ 16:47) (105/71 - 114/73)  Lipid Panel: Date/Time: 07-18-22 @ 08:41  Cholesterol, Serum: 147  Direct LDL: --  HDL Cholesterol, Serum: 47  Total Cholesterol/HDL Ration Measurement: --  Triglycerides, Serum: 57

## 2022-08-17 NOTE — BH INPATIENT PSYCHIATRY PROGRESS NOTE - CURRENT MEDICATION
MEDICATIONS  (STANDING):  FLUoxetine Solution 20 milliGRAM(s) Oral daily  LORazepam     Tablet 1 milliGRAM(s) Oral every 12 hours  polyethylene glycol 3350 17 Gram(s) Oral two times a day  senna 2 Tablet(s) Oral at bedtime    MEDICATIONS  (PRN):  acetaminophen     Tablet .. 650 milliGRAM(s) Oral every 6 hours PRN Mild Pain (1-3)  aluminum hydroxide/magnesium hydroxide/simethicone Suspension 30 milliLiter(s) Oral every 4 hours PRN Dyspepsia  diphenhydrAMINE 50 milliGRAM(s) Oral every 6 hours PRN agitation  haloperidol     Tablet 2 milliGRAM(s) Oral every 6 hours PRN agitation  hydrOXYzine hydrochloride 25 milliGRAM(s) Oral every 6 hours PRN insomnia/anxiety  ibuprofen  Tablet. 200 milliGRAM(s) Oral every 6 hours PRN Moderate Pain (4 - 6)  ibuprofen  Tablet. 400 milliGRAM(s) Oral every 6 hours PRN headaches  magnesium hydroxide Suspension 30 milliLiter(s) Oral daily PRN Constipation. 2nd line  traZODone 50 milliGRAM(s) Oral at bedtime PRN insomnia

## 2022-08-18 PROCEDURE — 99232 SBSQ HOSP IP/OBS MODERATE 35: CPT

## 2022-08-18 RX ADMIN — Medication 20 MILLIGRAM(S): at 10:04

## 2022-08-18 RX ADMIN — Medication 1 MILLIGRAM(S): at 11:30

## 2022-08-18 RX ADMIN — Medication 200 MILLIGRAM(S): at 22:28

## 2022-08-18 RX ADMIN — Medication 1 MILLIGRAM(S): at 21:30

## 2022-08-18 RX ADMIN — Medication 200 MILLIGRAM(S): at 21:58

## 2022-08-18 NOTE — BH INPATIENT PSYCHIATRY PROGRESS NOTE - NSBHASSESSSUMMFT_PSY_ALL_CORE
Sharona Kumar is a 21 yo female with PMH asthma and PPH MDD, ODD, and cannabis use disorder who was bib mother to [facility] for concerns that pt was not eating, drinking, or acting like herself. Patient seen today where she demonstrates findings of subjective and objectively depressed affect, anhedonia, poor eating, sleeping, concentration, energy, and passive SI over a few weeks' duration. Patient also has some psychotic features including potential internal preoccupation and reports of AH. Findings consistent with major depressive episode with psychotic features. Patient considered to be of potential risk to herself due to depression and suicidality as well as unable to care for herself as she is not tending to her basic needs, requiring admission and treatment.     7/20: Pt more open today, speaks about intimate topics with writer, voices concerns of pregnancy. Denies depressed state and rejects medications but eventually agrees to keep adhering to meds with much encouragement. PO intake increasing. Pt denies SI.     7/25: Pt suffering panic attacks on the unit. Childlike behaviors noted, pt remains anxious and appears depressed. Ativan PRN given with much resistance from the patient.     7/26: Pt calmer, still appearing anxious and depressed. Reports paranoia, opens up more about recent stressors and traumas. Agreed to continue Prozac and started Zyprexa last night with no AE reported. Eating and drinking slightly more.     7/27: Pt anxious, tearful, cannot participate in PCL 5. Disorganized/illogical with persecutory and somatic delusional content.     7/28: Patient noted to be very tearful, difficult to engage in meaningful discussion outside of topic of discharge, had bm yesterday. Orders for constipation in place. Medicine consult continues to follow. Tolerating increase in zyprexa without issue.    7/29: Pt anxious and fearful appearing. Cannot verbalize concerns or converse with writer. Partially adherent to medications.     8/1: Pt remains quite anxious. Poorly related. Paranoia, somatic preoccupations present. Team met with mother today, pending decision of further inpatient tx vs discharge.     8/2: Pt anxious with paranoid ideation and somatic delusions present. Refuses to take medications. Work up returned normal.    8/3: Patient distressed, predominating paranoia, somatically preoccupied. Refusing medications, informed of TOO process.     8/4 Appears psychotic, distressed and confused, seen giggling and smiling to self. Took prn po haldol 5mg/ativan 2mg, poor hygiene- malodorous  8/8 Somatically preoccupied, selectively taking medications  8/9 Refusing medications, appears fearful, may be responding to internal stimuli  8/10 Continues to refuse medications, largely mute during interview. Should consider TOO.  8/11 Almost completely nonverbal; unable to follow commands; little to no PO intake. Eventually tearfully voiced concerns over "this thing" in her abdomen.   8/12 Not verbally interacting with staff today, yesterday she told staff that he had something in her stomach that was causing her to become weak, would not allow abdominal exam    Plan:  #MDE with psychotic features  -Prozac 20 mg for depressive and anxious features   -Zydis 5 mg for psychosis/paranoia, increase as needed   -Encourage pt to attend groups and adhere to medications   -Safety planning prior to discharge   -Ativan PRN 2 mg for anxiety/panic    Current medications acetaminophen     Tablet .. 650 milliGRAM(s) Oral every 6 hours PRN  aluminum hydroxide/magnesium hydroxide/simethicone Suspension 30 milliLiter(s) Oral every 4 hours PRN  diphenhydrAMINE 50 milliGRAM(s) Oral every 6 hours PRN  FLUoxetine Solution 20 milliGRAM(s) Oral daily  haloperidol     Tablet 5 milliGRAM(s) Oral every 6 hours PRN  hydrOXYzine hydrochloride 25 milliGRAM(s) Oral every 6 hours PRN  ibuprofen  Tablet. 200 milliGRAM(s) Oral every 6 hours PRN  ibuprofen  Tablet. 400 milliGRAM(s) Oral every 6 hours PRN  LORazepam     Tablet 2 milliGRAM(s) Oral every 6 hours PRN  magnesium hydroxide Suspension 30 milliLiter(s) Oral daily PRN  OLANZapine Disintegrating Tablet 5 milliGRAM(s) Oral at bedtime  polyethylene glycol 3350 17 Gram(s) Oral two times a day  senna 2 Tablet(s) Oral at bedtime  traZODone 50 milliGRAM(s) Oral at bedtime PRN

## 2022-08-18 NOTE — BH INPATIENT PSYCHIATRY PROGRESS NOTE - NSBHMETABOLIC_PSY_ALL_CORE_FT
BMI: BMI (kg/m2): 22.9 (07-17-22 @ 22:37)  HbA1c: A1C with Estimated Average Glucose Result: 4.7 % (07-18-22 @ 08:41)    Glucose:   BP: 113/74 (08-18-22 @ 16:31) (105/71 - 114/73)  Lipid Panel: Date/Time: 07-18-22 @ 08:41  Cholesterol, Serum: 147  Direct LDL: --  HDL Cholesterol, Serum: 47  Total Cholesterol/HDL Ration Measurement: --  Triglycerides, Serum: 57

## 2022-08-18 NOTE — BH INPATIENT PSYCHIATRY PROGRESS NOTE - NSBHFUPINTERVALHXFT_PSY_A_CORE
Pt brighter and attending more groups. Hygiene improved. Taking meds. Planning for Monday discharge.

## 2022-08-18 NOTE — BH INPATIENT PSYCHIATRY PROGRESS NOTE - NSBHCHARTREVIEWVS_PSY_A_CORE FT
Vital Signs Last 24 Hrs  T(C): 36.7 (08-18-22 @ 16:31), Max: 37.2 (08-18-22 @ 09:30)  T(F): 98 (08-18-22 @ 16:31), Max: 98.9 (08-18-22 @ 09:30)  HR: 84 (08-18-22 @ 16:31) (84 - 98)  BP: 113/74 (08-18-22 @ 16:31) (112/76 - 113/74)  BP(mean): --  RR: 18 (08-18-22 @ 16:31) (17 - 18)  SpO2: 100% (08-18-22 @ 16:31) (99% - 100%)

## 2022-08-19 PROCEDURE — 99232 SBSQ HOSP IP/OBS MODERATE 35: CPT

## 2022-08-19 RX ADMIN — Medication 1 MILLIGRAM(S): at 22:18

## 2022-08-19 RX ADMIN — Medication 20 MILLIGRAM(S): at 09:40

## 2022-08-19 RX ADMIN — Medication 1 MILLIGRAM(S): at 09:41

## 2022-08-19 NOTE — BH INPATIENT PSYCHIATRY PROGRESS NOTE - NSCGIIMPROVESX_PSY_ALL_CORE
4 = No change - symptoms remain essentially unchanged
5 = Minimally worse - slightly worse but may not be clinically meaningful; may represent very little change in basic clinical status or functional capacity
3 = Minimally improved - slightly better with little or no clinically meaningful reduction of symptoms.  Represents very little change in basic clinical status, level of care, or functional capacity.
5 = Minimally worse - slightly worse but may not be clinically meaningful; may represent very little change in basic clinical status or functional capacity
4 = No change - symptoms remain essentially unchanged
4 = No change - symptoms remain essentially unchanged
5 = Minimally worse - slightly worse but may not be clinically meaningful; may represent very little change in basic clinical status or functional capacity
3 = Minimally improved - slightly better with little or no clinically meaningful reduction of symptoms.  Represents very little change in basic clinical status, level of care, or functional capacity.
3 = Minimally improved - slightly better with little or no clinically meaningful reduction of symptoms.  Represents very little change in basic clinical status, level of care, or functional capacity.
5 = Minimally worse - slightly worse but may not be clinically meaningful; may represent very little change in basic clinical status or functional capacity
3 = Minimally improved - slightly better with little or no clinically meaningful reduction of symptoms.  Represents very little change in basic clinical status, level of care, or functional capacity.
3 = Minimally improved - slightly better with little or no clinically meaningful reduction of symptoms.  Represents very little change in basic clinical status, level of care, or functional capacity.

## 2022-08-19 NOTE — BH INPATIENT PSYCHIATRY PROGRESS NOTE - NSTXPSYCHOGOAL_PSY_ALL_CORE
Will be able to report experiencing hallucinations to staff
Will ask for PRN medication to manage hallucinations

## 2022-08-19 NOTE — BH INPATIENT PSYCHIATRY DISCHARGE NOTE - NSBHMETABOLIC_PSY_ALL_CORE_FT
BMI: BMI (kg/m2): 22.9 (07-17-22 @ 22:37)  HbA1c: A1C with Estimated Average Glucose Result: 4.7 % (07-18-22 @ 08:41)    Glucose:   BP: 111/75 (08-19-22 @ 16:36) (105/71 - 114/73)  Lipid Panel: Date/Time: 07-18-22 @ 08:41  Cholesterol, Serum: 147  Direct LDL: --  HDL Cholesterol, Serum: 47  Total Cholesterol/HDL Ration Measurement: --  Triglycerides, Serum: 57

## 2022-08-19 NOTE — BH INPATIENT PSYCHIATRY PROGRESS NOTE - NSBHATTESTCOMMENTATTENDFT_PSY_A_CORE
Recommendations:  Past and current observations and team discussions suggest ongoing psychosis and need for continuing titration of antipsychotic medications.   Brief hx: 23 yo female with PMH asthma and PPH MDD, ODD, and cannabis use disorder who was bib mother to [facility] for concerns that pt was not eating, drinking, or acting like herself. Patient seen today where she demonstrates findings of subjective and objectively depressed affect, anhedonia, poor eating, sleeping, concentration, energy, and passive SI over a few weeks' duration. Patient also has some psychotic features including potential internal preoccupation and reports of AH. Findings consistent with major depressive episode with psychotic features. Patient considered to be of potential risk to herself due to depression and suicidality as well as unable to care for herself as she is not tending to her basic needs, requiring admission and treatment.   ---xyx  ;;08/03:  Isolative; stays in bed; makes minimal eye contact; not conversational.  Talks about her whole body being Xrayed and feeling "weak".  Evidence for thought blocking and long response latencies.  Believed to be hallucinating and psychotic.    Current medications acetaminophen Tablet .. 650 milliGRAM(s) Oral every 6 hours PRN ;aluminum hydroxide/magnesium hydroxide/simethicone Suspension 30 milliLiter(s) Oral every 4 hours PRN ;diphenhydrAMINE 50 milliGRAM(s) Oral every 6 hours PRN ;FLUoxetine Solution 20 milliGRAM(s) Oral daily ;haloperidol Tablet 5 milliGRAM(s) Oral every 6 hours PRN ;hydrOXYzine hydrochloride 25 milliGRAM(s) Oral every 6 hours PRN ;ibuprofen Tablet. 200 milliGRAM(s) Oral every 6 hours PRN ;ibuprofen Tablet. 400 milliGRAM(s) Oral every 6 hours PRN ;LORazepam Tablet 2 milliGRAM(s) Oral every 6 hours PRN ;magnesium hydroxide Suspension 30 milliLiter(s) Oral daily PRN ;OLANZapine Disintegrating Tablet 5 milliGRAM(s) Oral at bedtime ;polyethylene glycol 3350 17 Gram(s) Oral two times a day ;senna 2 Tablet(s) Oral at bedtime ;traZODone 50 milliGRAM(s) Oral at bedtime PRN
Pt barely able to have a conversation with team. Seems psychotic as well as depressed. Pt is very against having to take psychotropics. 
Pt took zyprexa with much encouragement. Self care worse. More oddly related. Pt has extremely regressed behavior with mother. 
Recommendations:  Past and current observations and team discussions suggest ongoing psychosis and need for continuing titration of antipsychotic medications.   Brief hx: 23 yo female with PMH asthma and PPH MDD, ODD, and cannabis use disorder who was bib mother to [facility] for concerns that pt was not eating, drinking, or acting like herself. Patient seen today where she demonstrates findings of subjective and objectively depressed affect, anhedonia, poor eating, sleeping, concentration, energy, and passive SI over a few weeks' duration. Patient also has some psychotic features including potential internal preoccupation and reports of AH. Findings consistent with major depressive episode with psychotic features. Patient considered to be of potential risk to herself due to depression and suicidality as well as unable to care for herself as she is not tending to her basic needs, requiring admission and treatment.   ---xyx  ;;08/03:  Isolative; stays in bed; makes minimal eye contact; not conversational.  Talks about her whole body being Xrayed and feeling "weak".  Evidence for thought blocking and long response latencies.  Believed to be hallucinating and psychotic.    Current medications acetaminophen Tablet .. 650 milliGRAM(s) Oral every 6 hours PRN ;aluminum hydroxide/magnesium hydroxide/simethicone Suspension 30 milliLiter(s) Oral every 4 hours PRN ;diphenhydrAMINE 50 milliGRAM(s) Oral every 6 hours PRN ;FLUoxetine Solution 20 milliGRAM(s) Oral daily ;haloperidol Tablet 5 milliGRAM(s) Oral every 6 hours PRN ;hydrOXYzine hydrochloride 25 milliGRAM(s) Oral every 6 hours PRN ;ibuprofen Tablet. 200 milliGRAM(s) Oral every 6 hours PRN ;ibuprofen Tablet. 400 milliGRAM(s) Oral every 6 hours PRN ;LORazepam Tablet 2 milliGRAM(s) Oral every 6 hours PRN ;magnesium hydroxide Suspension 30 milliLiter(s) Oral daily PRN ;OLANZapine Disintegrating Tablet 5 milliGRAM(s) Oral at bedtime ;polyethylene glycol 3350 17 Gram(s) Oral two times a day ;senna 2 Tablet(s) Oral at bedtime ;traZODone 50 milliGRAM(s) Oral at bedtime PRN
More paranoid. Strongly considering treatment over objection. MDD with psychosis vs PTSD still main differential diagnoses
Pt having somatic delusions and may have delusions of pregnancy. Requesting multiple pregnancy tests. Continues to be very suspicious of psych meds. Continues to report abdominal pain. 
Repeating BMP to see if hypokalemia persists. Pt remains depressed and psychotic. Intermittently taking meds. 
Pt remains very depressed and anxious with a lot of somatic preoccupation. Effexor started. Mother reporting patient does better with liquid medication so will consider liquid prozac and liquid risperdal or zydis. 
Pt seems more depressed, anxious, and psychotic with growing somatic preoccupation and distrust of team. May have to apply for treatment over objection if she continues to resist psychotropics. 
Pt much improved on prozac and ativan, especially the ativan. Pt could well benefit from antipsychotic but continues to refuse one. For discharge Monday. Mother to pick her up and agrees she is much improved. Pt has been attending groups, interacting more with peers, and seems much better.

## 2022-08-19 NOTE — BH INPATIENT PSYCHIATRY PROGRESS NOTE - NSTXDEPRESGOAL_PSY_ALL_CORE
Other...
Attend and participate in at least 2 groups daily despite low mood/energy
Other...
Exhibit improvements in self-grooming, hygiene, sleep and appetite
Other...
Other...
Exhibit improvements in self-grooming, hygiene, sleep and appetite
Other...
Exhibit improvements in self-grooming, hygiene, sleep and appetite
Other...
Exhibit improvements in self-grooming, hygiene, sleep and appetite
Exhibit improvements in self-grooming, hygiene, sleep and appetite
Other...
Exhibit improvements in self-grooming, hygiene, sleep and appetite

## 2022-08-19 NOTE — BH INPATIENT PSYCHIATRY PROGRESS NOTE - NSBHMSETHTPROC_PSY_A_CORE
Linear/Unable to assess
Unable to assess
Linear/Unable to assess
Perseverative
Unable to assess
Perseverative
Unable to assess
Perseverative
Disorganized/Perseverative/Illogical
Disorganized/Perseverative/Illogical
Unable to assess
Perseverative
Linear/Unable to assess
Perseverative
Linear/Normal reasoning
Perseverative
Linear/Unable to assess
Disorganized/Perseverative/Illogical
Unable to assess
Unable to assess
Linear
Perseverative
Linear/Perseverative
Unable to assess
Unable to assess
Linear/Unable to assess

## 2022-08-19 NOTE — BH INPATIENT PSYCHIATRY DISCHARGE NOTE - ATTENDING DISCHARGE PHYSICAL EXAMINATION:
MSE- Well groomed & related, good EC. -PMR/A Speech: wnl Mood: "Much better." Affect: much duffy and less contricted TP: linear, logical TC: -SI/HI/AH/VH/PI. I&J: fair

## 2022-08-19 NOTE — BH INPATIENT PSYCHIATRY PROGRESS NOTE - NSTXSLFCREDATETRGT_PSY_ALL_CORE
18-Aug-2022
18-Aug-2022
25-Aug-2022
18-Aug-2022
25-Aug-2022

## 2022-08-19 NOTE — BH INPATIENT PSYCHIATRY PROGRESS NOTE - NSBHCONSDANGERSELF_PSY_A_CORE
suicidal behavior/unable to care for self

## 2022-08-19 NOTE — BH INPATIENT PSYCHIATRY DISCHARGE NOTE - OTHER PAST PSYCHIATRIC HISTORY (INCLUDE DETAILS REGARDING ONSET, COURSE OF ILLNESS, INPATIENT/OUTPATIENT TREATMENT)
PPH MDD, ODD, and cannabis use disorder who was BIB mother to [facility] for concerns that pt was not eating, drinking, or acting like herself.

## 2022-08-19 NOTE — BH INPATIENT PSYCHIATRY PROGRESS NOTE - PRN MEDS
MEDICATIONS  (PRN):  acetaminophen     Tablet .. 650 milliGRAM(s) Oral every 6 hours PRN Mild Pain (1-3)  aluminum hydroxide/magnesium hydroxide/simethicone Suspension 30 milliLiter(s) Oral every 4 hours PRN Dyspepsia  diphenhydrAMINE 50 milliGRAM(s) Oral every 6 hours PRN agitation  haloperidol     Tablet 2 milliGRAM(s) Oral every 6 hours PRN agitation  hydrOXYzine hydrochloride 25 milliGRAM(s) Oral every 6 hours PRN insomnia/anxiety  ibuprofen  Tablet. 400 milliGRAM(s) Oral every 6 hours PRN headaches  ibuprofen  Tablet. 200 milliGRAM(s) Oral every 6 hours PRN Moderate Pain (4 - 6)  magnesium hydroxide Suspension 30 milliLiter(s) Oral daily PRN Constipation. 2nd line  traZODone 50 milliGRAM(s) Oral at bedtime PRN insomnia   MEDICATIONS  (PRN):

## 2022-08-19 NOTE — BH INPATIENT PSYCHIATRY PROGRESS NOTE - NSBHCONTPROVIDER_PSY_ALL_CORE
Not applicable

## 2022-08-19 NOTE — BH INPATIENT PSYCHIATRY PROGRESS NOTE - NSTXPSYCHOINTERMD_PSY_ALL_CORE
Individual psychotherapy with patient to assist pt in understanding AVH and other psychotic symptoms. Ativan and prozac for catatonia/anxiety and depression/anxiety respectively . TOO if pt continues not to take meds. 
Individual psychotherapy with patient to assist pt in understanding AVH and other psychotic symptoms. Zyprexa initiated 7/25/22. 
Individual psychotherapy with patient to assist pt in understanding AVH and other psychotic symptoms. Zyprexa initiated 7/25/22. TOO pending.
Individual psychotherapy with patient to assist pt in understanding AVH and other psychotic symptoms. Will encourage Abilify use. 
Individual psychotherapy with patient to assist pt in understanding AVH and other psychotic symptoms. Zyprexa initiated 7/25/22. 
Individual psychotherapy with patient to assist pt in understanding AVH and other psychotic symptoms. Zyprexa initiated 7/25/22. 
Individual psychotherapy with patient to assist pt in understanding AVH and other psychotic symptoms. Zyprexa initiated 7/25/22. TOO pending.
Individual psychotherapy with patient to assist pt in understanding AVH and other psychotic symptoms. Zyprexa initiated 7/25/22. 
Individual psychotherapy with patient to assist pt in understanding AVH and other psychotic symptoms. Will encourage Abilify use. 
Individual psychotherapy with patient to assist pt in understanding AVH and other psychotic symptoms. Will encourage Abilify use. 
Individual psychotherapy with patient to assist pt in understanding AVH and other psychotic symptoms. Zyprexa initiated 7/25/22. 
Individual psychotherapy with patient to assist pt in understanding AVH and other psychotic symptoms. Will encourage Abilify use. 
Individual psychotherapy with patient to assist pt in understanding AVH and other psychotic symptoms. Zyprexa initiated 7/25/22. TOO pending.
Individual psychotherapy with patient to assist pt in understanding AVH and other psychotic symptoms. Zyprexa initiated 7/25/22. TOO pending.
Individual psychotherapy with patient to assist pt in understanding AVH and other psychotic symptoms. Ativan and prozac for catatonia/anxiety and depression/anxiety respectively . TOO if pt continues not to take meds. 
Individual psychotherapy with patient to assist pt in understanding AVH and other psychotic symptoms. Zyprexa initiated 7/25/22. TOO pending.
Individual psychotherapy with patient to assist pt in understanding AVH and other psychotic symptoms. Zyprexa initiated 7/25/22. 
Individual psychotherapy with patient to assist pt in understanding AVH and other psychotic symptoms. Will encourage Abilify use. 
Individual psychotherapy with patient to assist pt in understanding AVH and other psychotic symptoms. Zyprexa initiated 7/25/22. TOO pending.
Individual psychotherapy with patient to assist pt in understanding AVH and other psychotic symptoms. Ativan and prozac for catatonia/anxiety and depression/anxiety respectively . TOO if pt continues not to take meds.

## 2022-08-19 NOTE — BH INPATIENT PSYCHIATRY DISCHARGE NOTE - NSDCMRMEDTOKEN_GEN_ALL_CORE_FT
FLUoxetine 20 mg/5 mL oral solution: 5 milliliter(s) orally once a day  LORazepam 1 mg oral tablet: 1 tab(s) orally every 12 hours   Ativan 1 mg oral tablet: 1 tab(s) orally 2 times a day MDD:2 mg  Ativan 1 mg oral tablet: 1 tab(s) orally 2 times a day MDD:2 mg  FLUoxetine 20 mg/5 mL oral solution: 5 milliliter(s) orally once a day   FLUoxetine 20 mg/5 mL oral solution: 5 milliliter(s) orally once a day  FLUoxetine 20 mg/5 mL oral solution: 5 milliliter(s) orally once a day   LORazepam 1 mg oral tablet: 1 tab(s) orally every 12 hours

## 2022-08-19 NOTE — BH INPATIENT PSYCHIATRY PROGRESS NOTE - NSBHATTESTBILLINGAW_PSY_A_CORE
[id number]-Rydewyrsuf Inpatient care - moderate complexity - 25 minutes
57223-Fsarmwhvgk Inpatient care - low complexity - 15 minutes
75709-Vuoxutcarw Inpatient care - high complexity - 35 minutes
35298-Bnvvpvgsam Inpatient care - high complexity - 35 minutes
16774-Dtqsasmhmk Inpatient care - moderate complexity - 25 minutes
07241-Jtylpnoqgd Inpatient care - high complexity - 35 minutes
38658-Unmhorgagv Inpatient care - high complexity - 35 minutes
78414-Cktrgjgbwk Inpatient care - moderate complexity - 25 minutes
92434-Czslyflfgd Inpatient care - high complexity - 35 minutes
23632-Rhokmzhnjb Inpatient care - moderate complexity - 25 minutes
47011-Vapdcoxlxz Inpatient care - moderate complexity - 25 minutes
63777-Jljsacywhb Inpatient care - moderate complexity - 25 minutes
32327-Rzjnfhmxzs Inpatient care - moderate complexity - 25 minutes
42061-Hzntonykav Inpatient care - moderate complexity - 25 minutes
46143-Ufwtrjcbyx Inpatient care - moderate complexity - 25 minutes
23235-Raomtpqmzu Inpatient care - high complexity - 35 minutes
36852-Ysbhbwqitd Inpatient care - moderate complexity - 25 minutes
65631-Sdkcgwvldj Inpatient care - moderate complexity - 25 minutes
52865-Oyywjkhimw Inpatient care - moderate complexity - 25 minutes
81715-Tudwkulscu Inpatient care - moderate complexity - 25 minutes
20567-Hbnnkechdk Inpatient care - moderate complexity - 25 minutes
52034-Lcuxcoglqv Inpatient care - moderate complexity - 25 minutes
94009-Tdjqvpobnt Inpatient care - moderate complexity - 25 minutes
45292-Ypptvekhfj Inpatient care - moderate complexity - 25 minutes
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
## 2022-08-19 NOTE — BH INPATIENT PSYCHIATRY PROGRESS NOTE - NSBHASSESSSUMMFT_PSY_ALL_CORE
Sharona Kumar is a 23 yo female with PMH asthma and PPH MDD, ODD, and cannabis use disorder who was bib mother to [facility] for concerns that pt was not eating, drinking, or acting like herself. Patient seen today where she demonstrates findings of subjective and objectively depressed affect, anhedonia, poor eating, sleeping, concentration, energy, and passive SI over a few weeks' duration. Patient also has some psychotic features including potential internal preoccupation and reports of AH. Findings consistent with major depressive episode with psychotic features. Patient considered to be of potential risk to herself due to depression and suicidality as well as unable to care for herself as she is not tending to her basic needs, requiring admission and treatment.     7/20: Pt more open today, speaks about intimate topics with writer, voices concerns of pregnancy. Denies depressed state and rejects medications but eventually agrees to keep adhering to meds with much encouragement. PO intake increasing. Pt denies SI.     7/25: Pt suffering panic attacks on the unit. Childlike behaviors noted, pt remains anxious and appears depressed. Ativan PRN given with much resistance from the patient.     7/26: Pt calmer, still appearing anxious and depressed. Reports paranoia, opens up more about recent stressors and traumas. Agreed to continue Prozac and started Zyprexa last night with no AE reported. Eating and drinking slightly more.     7/27: Pt anxious, tearful, cannot participate in PCL 5. Disorganized/illogical with persecutory and somatic delusional content.     7/28: Patient noted to be very tearful, difficult to engage in meaningful discussion outside of topic of discharge, had bm yesterday. Orders for constipation in place. Medicine consult continues to follow. Tolerating increase in zyprexa without issue.    7/29: Pt anxious and fearful appearing. Cannot verbalize concerns or converse with writer. Partially adherent to medications.     8/1: Pt remains quite anxious. Poorly related. Paranoia, somatic preoccupations present. Team met with mother today, pending decision of further inpatient tx vs discharge.     8/2: Pt anxious with paranoid ideation and somatic delusions present. Refuses to take medications. Work up returned normal.    8/3: Patient distressed, predominating paranoia, somatically preoccupied. Refusing medications, informed of TOO process.     8/4 Appears psychotic, distressed and confused, seen giggling and smiling to self. Took prn po haldol 5mg/ativan 2mg, poor hygiene- malodorous  8/8 Somatically preoccupied, selectively taking medications  8/9 Refusing medications, appears fearful, may be responding to internal stimuli  8/10 Continues to refuse medications, largely mute during interview. Should consider TOO.  8/11 Almost completely nonverbal; unable to follow commands; little to no PO intake. Eventually tearfully voiced concerns over "this thing" in her abdomen.   8/12 Not verbally interacting with staff today, yesterday she told staff that he had something in her stomach that was causing her to become weak, would not allow abdominal exam    - 08/19: patient is improving, affect is brighter and less constricted and she reports improved mood. Has residual signs of symptoms of depression, but is overall much improved. No SI. Plan is to discharge patient on Monday.

## 2022-08-19 NOTE — BH INPATIENT PSYCHIATRY PROGRESS NOTE - NSTXMEDICINTERMD_PSY_ALL_CORE
psychopharm management x 15 min daily, enlist mother's help in investing pt in treatment plan. 

## 2022-08-19 NOTE — BH INPATIENT PSYCHIATRY PROGRESS NOTE - NSTXDEPRESDATEEST_PSY_ALL_CORE
25-Jul-2022
17-Aug-2022
15-Aug-2022
08-Aug-2022
25-Jul-2022
01-Aug-2022
01-Aug-2022
25-Jul-2022
01-Aug-2022
08-Aug-2022
08-Aug-2022
18-Jul-2022
01-Aug-2022
18-Jul-2022
16-Aug-2022
25-Jul-2022
25-Jul-2022
18-Jul-2022
01-Aug-2022
18-Jul-2022
08-Aug-2022
01-Aug-2022
25-Jul-2022
17-Aug-2022
01-Aug-2022
17-Aug-2022

## 2022-08-19 NOTE — BH INPATIENT PSYCHIATRY PROGRESS NOTE - NSBHADMITMEDEDUDETAILS_PSY_A_CORE FT
yes

## 2022-08-19 NOTE — BH INPATIENT PSYCHIATRY PROGRESS NOTE - NSBHMSEBODY_PSY_A_CORE
Average build

## 2022-08-19 NOTE — BH INPATIENT PSYCHIATRY DISCHARGE NOTE - NSBHDCCONDITION_PSY_ALL_CORE
Symptomatic, but no longer needs inpatient level of care The patient is a 29y Male complaining of seizures.

## 2022-08-19 NOTE — BH INPATIENT PSYCHIATRY PROGRESS NOTE - NSTXDEPRESDATETRGT_PSY_ALL_CORE
16-Aug-2022
08-Aug-2022
08-Aug-2022
22-Aug-2022
08-Aug-2022
01-Aug-2022
18-Aug-2022
01-Aug-2022
11-Aug-2022
08-Aug-2022
16-Aug-2022
08-Aug-2022
25-Jul-2022
11-Aug-2022
01-Aug-2022
22-Jul-2022
01-Aug-2022
25-Jul-2022
25-Jul-2022
16-Aug-2022
01-Aug-2022
18-Aug-2022
01-Aug-2022
18-Aug-2022

## 2022-08-19 NOTE — BH INPATIENT PSYCHIATRY PROGRESS NOTE - NSTXSLFCREINTERMD_PSY_ALL_CORE
ativan, prozac. Enlist mother's help in investing patient in treatment plan. 
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
## 2022-08-19 NOTE — BH INPATIENT PSYCHIATRY PROGRESS NOTE - MSE OPTIONS
Structured MSE

## 2022-08-19 NOTE — BH INPATIENT PSYCHIATRY PROGRESS NOTE - NSTXANXDATEEST_PSY_ALL_CORE
25-Jul-2022

## 2022-08-19 NOTE — BH SAFETY PLAN - WARNING SIGN 1
Pt completed a written safety plan and received a copy of it to take with her. An additional copy has been filed in Pt's chart on the unit.

## 2022-08-19 NOTE — BH INPATIENT PSYCHIATRY PROGRESS NOTE - NSTXSLFCREDATEEST_PSY_ALL_CORE
17-Aug-2022

## 2022-08-19 NOTE — BH INPATIENT PSYCHIATRY PROGRESS NOTE - NSTXMEDICDATETRGT_PSY_ALL_CORE
18-Aug-2022
25-Aug-2022
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
## 2022-08-19 NOTE — BH INPATIENT PSYCHIATRY PROGRESS NOTE - NSTXMEDICGOAL_PSY_ALL_CORE
Take all medications as prescribed

## 2022-08-19 NOTE — BH INPATIENT PSYCHIATRY PROGRESS NOTE - NSTXDEPRESPROGRES_PSY_ALL_CORE
Improving
Worsening
Improving
Improving
Worsening
Improving
No Change
Improving
No Change
Improving
Improving
No Change
Improving
No Change
Improving
Improving
No Change
No Change
Improving
No Change
Improving
No Change
Met - goal discontinued
Worsening

## 2022-08-19 NOTE — BH INPATIENT PSYCHIATRY PROGRESS NOTE - NSBHATTESTSTAFFAMEND_PSY_A_CORE
I have personally seen and examined this patient. I fully participated in the care of this patient. I have made amendments to the documentation where appropriate and otherwise agree with the history, physical exam, and plan as documented by the

## 2022-08-19 NOTE — BH INPATIENT PSYCHIATRY DISCHARGE NOTE - NSDCPROCEDURESFT_PSY_ALL_CORE
COVID-19 PCR: NotDetec (22 Jul 2022 11:43)  COVID-19 PCR: NotDetec (17 Jul 2022 21:57)  Syphilis, HIV, GC, Chlamydia negative  Serum glucose suggesting pre-DM

## 2022-08-19 NOTE — BH INPATIENT PSYCHIATRY PROGRESS NOTE - NSBHMSEMOVE_PSY_A_CORE
No abnormal movements
Tremors/Other
Tremors/Other
No abnormal movements
Tremors/Other
No abnormal movements
No abnormal movements
Tremors/Other
Tremors/Other
No abnormal movements
Tremors/Other
No abnormal movements
Tremors/Other
Tremors/Other
No abnormal movements
Tremors/Other
No abnormal movements

## 2022-08-19 NOTE — BH INPATIENT PSYCHIATRY PROGRESS NOTE - NSBHADMITMEDEDUDETAILS_A_CORE FT
yes 

## 2022-08-19 NOTE — BH INPATIENT PSYCHIATRY PROGRESS NOTE - NSTXANXGOAL_PSY_ALL_CORE
Identify and practice 3 coping skills to manage anxiety

## 2022-08-19 NOTE — BH INPATIENT PSYCHIATRY PROGRESS NOTE - NSTXANXINTERMD_PSY_ALL_CORE
Educate pt on management of anxiety and panic attack treatment. Ativan PRN. Prozac for anxiety management and Zyprexa initiated for paranoia that appears to contribute to anxiety. 
Educate pt on management of anxiety and panic attack treatment. Ativan PRN. Prozac for anxiety management and Zyprexa initiated for paranoia that appears to contribute to anxiety. Haldol 2mg bid initiated. PRNs available as needed
Educate pt on management of anxiety and panic attack treatment. Ativan PRN. Prozac for anxiety management and Zyprexa initiated for paranoia that appears to contribute to anxiety. 
Educate pt on management of anxiety and panic attack treatment. Ativan PRN. Prozac for anxiety management and Zyprexa initiated for paranoia that appears to contribute to anxiety. Haldol 2mg bid initiated. PRNs available as needed
Educate pt on management of anxiety and panic attack treatment. Ativan PRN. Prozac for anxiety management and Zyprexa initiated for paranoia that appears to contribute to anxiety. 
Educate pt on management of anxiety and panic attack treatment. Ativan PRN. Prozac for anxiety management and Zyprexa initiated for paranoia that appears to contribute to anxiety. Haldol 2mg bid initiated. PRNs available as needed
Educate pt on management of anxiety and panic attack treatment. Ativan PRN. Prozac for anxiety management and Zyprexa initiated for paranoia that appears to contribute to anxiety. 
Educate pt on management of anxiety and panic attack treatment. Ativan PRN. Prozac for anxiety management and Zyprexa initiated for paranoia that appears to contribute to anxiety. 
Educate pt on management of anxiety and panic attack treatment. Ativan PRN. Prozac for anxiety management and Zyprexa initiated for paranoia that appears to contribute to anxiety. Haldol 2mg bid initiated. PRNs available as needed
Educate pt on management of anxiety and panic attack treatment. Ativan PRN. Prozac for anxiety management and Zyprexa initiated for paranoia that appears to contribute to anxiety. Haldol 2mg bid initiated. PRNs available as needed
Educate pt on management of anxiety and panic attack treatment. Ativan PRN. Prozac for anxiety management and Zyprexa initiated for paranoia that appears to contribute to anxiety. PRNs available as needed
Educate pt on management of anxiety and panic attack treatment. Ativan PRN. Prozac for anxiety management and Zyprexa initiated for paranoia that appears to contribute to anxiety. Haldol 2mg bid initiated. PRNs available as needed
Educate pt on management of anxiety and panic attack treatment. Ativan PRN. 
Educate pt on management of anxiety and panic attack treatment. Ativan PRN. Prozac for anxiety management and Zyprexa initiated for paranoia that appears to contribute to anxiety. Haldol 2mg bid initiated. PRNs available as needed
Educate pt on management of anxiety and panic attack treatment. Ativan PRN. Prozac for anxiety management and Zyprexa initiated for paranoia that appears to contribute to anxiety. 
Educate pt on management of anxiety and panic attack treatment. Ativan PRN. Prozac for anxiety management and Zyprexa initiated for paranoia that appears to contribute to anxiety. Haldol 2mg bid initiated. PRNs available as needed
Educate pt on management of anxiety and panic attack treatment. Ativan PRN. Prozac for anxiety management and Zyprexa initiated for paranoia that appears to contribute to anxiety. 
Educate pt on management of anxiety and panic attack treatment. Ativan PRN. Prozac for anxiety management and Zyprexa initiated for paranoia that appears to contribute to anxiety. Haldol 2mg bid initiated. PRNs available as needed
Educate pt on management of anxiety and panic attack treatment. Ativan PRN. Prozac for anxiety management and Zyprexa initiated for paranoia that appears to contribute to anxiety. Haldol 2mg bid initiated. PRNs available as needed
Educate pt on management of anxiety and panic attack treatment. Ativan PRN. Prozac for anxiety management and Zyprexa initiated for paranoia that appears to contribute to anxiety. PRNs available as needed
Educate pt on management of anxiety and panic attack treatment. Ativan PRN. Prozac for anxiety management and Zyprexa initiated for paranoia that appears to contribute to anxiety. Haldol 2mg bid initiated. PRNs available as needed
Educate pt on management of anxiety and panic attack treatment. Ativan PRN. Prozac for anxiety management and Zyprexa initiated for paranoia that appears to contribute to anxiety. Haldol 2mg bid initiated. PRNs available as needed

## 2022-08-19 NOTE — BH INPATIENT PSYCHIATRY PROGRESS NOTE - NSTXPROBSLFCRE_PSY_ALL_CORE
SELF-CARE DEFICIT

## 2022-08-19 NOTE — BH INPATIENT PSYCHIATRY PROGRESS NOTE - NSICDXBHSECONDARYDX_PSY_ALL_CORE
Catatonia   F06.1  
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

## 2022-08-19 NOTE — BH INPATIENT PSYCHIATRY PROGRESS NOTE - NSTXPSYCHOPROGRES_PSY_ALL_CORE
Improving
No Change
Improving
No Change
No Change
Improving
Improving
No Change
Improving
No Change
Improving
Improving
No Change
Improving
No Change
No Change
Improving

## 2022-08-19 NOTE — BH INPATIENT PSYCHIATRY PROGRESS NOTE - GENERAL APPEARANCE
No deformities present

## 2022-08-19 NOTE — BH INPATIENT PSYCHIATRY PROGRESS NOTE - NSBHMSEATTEN_PSY_A_CORE
Impaired
Normal
Impaired
Normal
Impaired
Unable to assess
Impaired
Normal
Normal
Impaired
Normal
Impaired
Normal
Unable to assess
Impaired
Impaired
Unable to assess
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
## 2022-08-19 NOTE — BH INPATIENT PSYCHIATRY PROGRESS NOTE - NSTXPSYCHODATENEW_PSY_ALL_CORE
01-Aug-2022

## 2022-08-19 NOTE — BH INPATIENT PSYCHIATRY PROGRESS NOTE - NSTXDEPRESDATENEW_PSY_ALL_CORE
24-Aug-2022

## 2022-08-19 NOTE — BH INPATIENT PSYCHIATRY PROGRESS NOTE - NSCGISEVERILLNESS_PSY_ALL_CORE
5 = Markedly ill - intrusive symptoms that distinctly impair social/occupational function or cause intrusive levels of distress

## 2022-08-19 NOTE — BH INPATIENT PSYCHIATRY PROGRESS NOTE - NSBHFUPINTERVALHXFT_PSY_A_CORE
Patient was interviewed today for follow up. She reports she feels better, had a good night of sleep yesterday and good appetite. She reports improved mood, smiled during the interview (brighter affect) and looks forward to going home. She denies SI.

## 2022-08-19 NOTE — BH INPATIENT PSYCHIATRY PROGRESS NOTE - NSTXSLFCREGOAL_PSY_ALL_CORE
Will perform ADLs without assistance/prompts daily

## 2022-08-19 NOTE — BH INPATIENT PSYCHIATRY PROGRESS NOTE - NSBHCHARTREVIEWVS_PSY_A_CORE FT
Vital Signs Last 24 Hrs  T(C): 36.6 (08-19-22 @ 08:08), Max: 36.7 (08-18-22 @ 16:31)  T(F): 97.9 (08-19-22 @ 08:08), Max: 98 (08-18-22 @ 16:31)  HR: 90 (08-19-22 @ 08:08) (84 - 90)  BP: 110/72 (08-19-22 @ 08:08) (110/72 - 113/74)  BP(mean): --  RR: 18 (08-19-22 @ 08:08) (18 - 18)  SpO2: 100% (08-19-22 @ 08:08) (100% - 100%)     Vital Signs Last 24 Hrs  T(C): --  T(F): --  HR: --  BP: --  BP(mean): --  RR: --  SpO2: --

## 2022-08-19 NOTE — BH INPATIENT PSYCHIATRY PROGRESS NOTE - NSBHMSEINSIGHT_PSY_A_CORE
Fair
Poor
Fair
Poor
Fair
Poor
Poor
Fair
Fair
Poor
Fair
Poor
Unable to assess
Poor
Fair
Unable to assess
Poor
Fair
Fair
Poor
Fair
Unable to assess

## 2022-08-19 NOTE — BH INPATIENT PSYCHIATRY PROGRESS NOTE - NSBHATTESTTYPEVISIT_PSY_A_CORE
On-site Attending supervising JENNIFER (99XXX codes)
On-site Attending supervising JENNIFER (99XXX codes)
Attending with Resident/Fellow/Student
Attending Only
On-site Attending supervising JENNIFER (99XXX codes)
Attending Only
On-site Attending supervising JENNIFER (99XXX codes)
Attending Only
On-site Attending supervising JENNIFER (99XXX codes)
On-site Attending supervising JENNIFER (99XXX codes)
Attending with Resident/Fellow/Student
Attending with Resident/Fellow/Student
Attending Only
Attending Only
Attending with Resident/Fellow/Student
Attending Only
On-site Attending supervising JENNIFER (99XXX codes)
Attending Only
Attending with Resident/Fellow/Student
On-site Attending supervising JENNIFER (99XXX codes)

## 2022-08-19 NOTE — BH INPATIENT PSYCHIATRY PROGRESS NOTE - NSTXPROBPSYCHO_PSY_ALL_CORE
PSYCHOTIC SYMPTOMS

## 2022-08-19 NOTE — BH INPATIENT PSYCHIATRY PROGRESS NOTE - NSBHMETABOLIC_PSY_ALL_CORE_FT
BMI: BMI (kg/m2): 22.9 (07-17-22 @ 22:37)  HbA1c: A1C with Estimated Average Glucose Result: 4.7 % (07-18-22 @ 08:41)    Glucose:   BP: 110/72 (08-19-22 @ 08:08) (105/71 - 114/73)  Lipid Panel: Date/Time: 07-18-22 @ 08:41  Cholesterol, Serum: 147  Direct LDL: --  HDL Cholesterol, Serum: 47  Total Cholesterol/HDL Ration Measurement: --  Triglycerides, Serum: 57   BMI: BMI (kg/m2): 22.9 (07-17-22 @ 22:37)  HbA1c: A1C with Estimated Average Glucose Result: 4.7 % (07-18-22 @ 08:41)    Glucose:   BP: 107/72 (08-22-22 @ 09:00) (105/71 - 110/73)  Lipid Panel: Date/Time: 07-18-22 @ 08:41  Cholesterol, Serum: 147  Direct LDL: --  HDL Cholesterol, Serum: 47  Total Cholesterol/HDL Ration Measurement: --  Triglycerides, Serum: 57

## 2022-08-19 NOTE — BH INPATIENT PSYCHIATRY PROGRESS NOTE - NSTXDEPRESINTERMD_PSY_ALL_CORE
Individual psychotherapy, Prozac. Thus far pt attends group activities. Patient speaking with psychologist more. Will administer PCL-5 once pt is further stabilized and able to participate. 
Individual psychotherapy, Effexor initiated. Pt educated on use of Effexor. Thus far pt attends group activities. 
Individual psychotherapy, Prozac. Thus far pt attends group activities. Patient speaking with psychologist more. Will administer PCL-5 once pt is further stabilized and able to participate. 
Individual psychotherapy, Prozac. Thus far pt attends group activities. Patient speaking with psychologist more. Will administer PCL-5.
Individual psychotherapy, Prozac. Thus far pt attends group activities. Patient speaking with psychologist more. Will administer PCL-5 once pt is further stabilized and able to participate. 
Individual psychotherapy, Effexor initiated. Pt educated on use of Effexor. Thus far pt attends group activities. 
Individual psychotherapy, Prozac. Thus far pt attends group activities. Patient speaking with psychologist more. Will administer PCL-5 once pt is further stabilized and able to participate. 
Individual psychotherapy, Prozac. Thus far pt attends group activities. Patient speaking with psychologist more. Will administer PCL-5 once pt is further stabilized and able to participate. 
Individual psychotherapy, Effexor initiated. Pt educated on use of Effexor. Thus far pt attends group activities. 
Individual psychotherapy, Prozac. Thus far pt attends group activities. Patient speaking with psychologist more. Will administer PCL-5 once pt is further stabilized and able to participate. 
Individual psychotherapy, Effexor initiated. Pt educated on use of Effexor. Thus far pt attends group activities. 
Individual psychotherapy, Prozac. Thus far pt attends group activities. Patient speaking with psychologist more. Will administer PCL-5 once pt is further stabilized and able to participate. 
Individual psychotherapy, Prozac. Thus far pt attends group activities. 
Individual psychotherapy, Prozac. Thus far pt attends group activities. Patient speaking with psychologist more. Will administer PCL-5 once pt is further stabilized and able to participate. 

## 2022-08-19 NOTE — BH INPATIENT PSYCHIATRY PROGRESS NOTE - CURRENT MEDICATION
MEDICATIONS  (STANDING):  FLUoxetine Solution 20 milliGRAM(s) Oral daily  LORazepam     Tablet 1 milliGRAM(s) Oral every 12 hours  polyethylene glycol 3350 17 Gram(s) Oral two times a day  senna 2 Tablet(s) Oral at bedtime    MEDICATIONS  (PRN):  acetaminophen     Tablet .. 650 milliGRAM(s) Oral every 6 hours PRN Mild Pain (1-3)  aluminum hydroxide/magnesium hydroxide/simethicone Suspension 30 milliLiter(s) Oral every 4 hours PRN Dyspepsia  diphenhydrAMINE 50 milliGRAM(s) Oral every 6 hours PRN agitation  haloperidol     Tablet 2 milliGRAM(s) Oral every 6 hours PRN agitation  hydrOXYzine hydrochloride 25 milliGRAM(s) Oral every 6 hours PRN insomnia/anxiety  ibuprofen  Tablet. 400 milliGRAM(s) Oral every 6 hours PRN headaches  ibuprofen  Tablet. 200 milliGRAM(s) Oral every 6 hours PRN Moderate Pain (4 - 6)  magnesium hydroxide Suspension 30 milliLiter(s) Oral daily PRN Constipation. 2nd line  traZODone 50 milliGRAM(s) Oral at bedtime PRN insomnia   MEDICATIONS  (STANDING):    MEDICATIONS  (PRN):

## 2022-08-19 NOTE — BH INPATIENT PSYCHIATRY PROGRESS NOTE - NSTXPROBANX_PSY_ALL_CORE
ANXIETY/PANIC/FEAR

## 2022-08-19 NOTE — BH INPATIENT PSYCHIATRY PROGRESS NOTE - NSTXMEDICPROGRES_PSY_ALL_CORE
Improving
Improving
Worsening
Improving

## 2022-08-19 NOTE — BH INPATIENT PSYCHIATRY PROGRESS NOTE - NSTXPSYCHODATEEST_PSY_ALL_CORE
18-Jul-2022
17-Aug-2022
18-Jul-2022
18-Jul-2022
17-Aug-2022
18-Jul-2022
18-Aug-2022
18-Jul-2022
17-Aug-2022
18-Jul-2022
16-Aug-2022

## 2022-08-19 NOTE — BH INPATIENT PSYCHIATRY PROGRESS NOTE - NSTXANXPROGRES_PSY_ALL_CORE
No Change
Worsening
No Change

## 2022-08-19 NOTE — BH INPATIENT PSYCHIATRY PROGRESS NOTE - NSBHMSEAFFQUAL_PSY_A_CORE
Anxious
Depressed
Anxious
Depressed/Anxious
Anxious
Depressed/Anxious
Depressed
Anxious
Depressed/Anxious
Depressed
Depressed/Anxious
Anxious
Depressed/Anxious
Depressed
Anxious
Affect was brighter, is improving, but still has residual level of depression/Depressed
Depressed/Anxious
Anxious

## 2022-08-19 NOTE — BH INPATIENT PSYCHIATRY PROGRESS NOTE - NSICDXBHPRIMARYDX_PSY_ALL_CORE
Major depressive disorder with psychotic features   F32.3  

## 2022-08-19 NOTE — BH INPATIENT PSYCHIATRY PROGRESS NOTE - NSBHMSEAFFRANGE_PSY_A_CORE
Constricted
Other
Constricted
Affect was brighter, is improving, but still is constricted/Constricted/Other
Constricted

## 2022-08-19 NOTE — BH INPATIENT PSYCHIATRY DISCHARGE NOTE - NSDCCPCAREPLAN_GEN_ALL_CORE_FT
PRINCIPAL DISCHARGE DIAGNOSIS  Diagnosis: Major depressive disorder, single episode with catatonia  Assessment and Plan of Treatment:        PRINCIPAL DISCHARGE DIAGNOSIS  Diagnosis: Severe recurrent major depression  Assessment and Plan of Treatment:       SECONDARY DISCHARGE DIAGNOSES  Diagnosis: Catatonia  Assessment and Plan of Treatment:

## 2022-08-19 NOTE — BH INPATIENT PSYCHIATRY PROGRESS NOTE - NSTXANXDATETRGT_PSY_ALL_CORE
16-Aug-2022
01-Aug-2022
01-Aug-2022
16-Aug-2022
01-Aug-2022
16-Aug-2022
16-Aug-2022
11-Aug-2022
16-Aug-2022
01-Aug-2022
16-Aug-2022
01-Aug-2022
16-Aug-2022
01-Aug-2022
11-Aug-2022
16-Aug-2022

## 2022-08-20 RX ADMIN — Medication 1 MILLIGRAM(S): at 10:43

## 2022-08-20 RX ADMIN — Medication 1 MILLIGRAM(S): at 21:52

## 2022-08-20 RX ADMIN — Medication 20 MILLIGRAM(S): at 11:30

## 2022-08-21 RX ORDER — FLUOXETINE HCL 10 MG
5 CAPSULE ORAL
Qty: 0 | Refills: 0 | DISCHARGE
Start: 2022-08-21

## 2022-08-21 RX ADMIN — Medication 1 MILLIGRAM(S): at 10:31

## 2022-08-21 RX ADMIN — Medication 20 MILLIGRAM(S): at 10:30

## 2022-08-21 RX ADMIN — Medication 1 MILLIGRAM(S): at 21:51

## 2022-08-21 NOTE — BH DISCHARGE NOTE NURSING/SOCIAL WORK/PSYCH REHAB - NSDCADDINFO1FT_PSY_ALL_CORE
Intake appointment scheduled for Thursday, 8/25 at 11am. This is an IN-PERSON appointment. Please bring a photo ID and insurance card.

## 2022-08-21 NOTE — BH DISCHARGE NOTE NURSING/SOCIAL WORK/PSYCH REHAB - PATIENT PORTAL LINK FT
You can access the FollowMyHealth Patient Portal offered by [facility] by registering at the following website: [email or web address]. By joining [facility]’s FollowMyHealth portal, you will also be able to view your health information using other applications (apps) compatible with our system.

## 2022-08-21 NOTE — BH DISCHARGE NOTE NURSING/SOCIAL WORK/PSYCH REHAB - NSDCPRGOAL_PSY_ALL_CORE
Over the course of tx., pt. fluctuated between self awareness, catatonia, labile, and panicked; once pt. consistently took prescribed medication she was able to speak at full volume, express her needs clearly, join groups, and be far more social on the unit; pt. was able to complete a safety plan wherein she was able to identify the specific action steps and coping skills needed for continued wellness.

## 2022-08-21 NOTE — BH DISCHARGE NOTE NURSING/SOCIAL WORK/PSYCH REHAB - NSCDUDCCRISIS_PSY_A_CORE
[facility] Well  1 [phone]) [facility]-WELL ([phone])  Text "WELL" to 22261  Website: www.[employer]/.[employer]iz[employer] 1 (706) 181-GXHY (9767) Website: www.safehorizon.org/.National Suicide Prevention Lifeline 5 (582) 845-6385/.  [employer]  1 (401) LIFENET (625-7651)/.  [facility]’s Behavioral [facility] Crisis [facility]  [address] [place] [address] [phone]   Hours:  Monday through Friday from 9 AM to 3 PM/.  [facility] Dept of  Affairs - Veterans Crisis Line  [phone], Option 1 [facility] Well  1 (218) [facility]-WELL ([phone])  Text "WELL" to 71273  Website: www.[employer]/.Safe Horizons 1 (110) 751-BRHH (1561) Website: www.safehorizon.org/.National Suicide Prevention Lifeline 3 (711) 082-5489/.  [employer]  1 (628) LIFENET (097-4227)/.  [facility]’s Behavioral Health Crisis Center  [address] [place] [address] [phone]   Hours:  Monday through Friday from 9 AM to 3 PM [facility] Well  1 (210) [facility]-WELL ([phone])  Text "WELL" to 89564  Website: www.[employer]/.Safe Horizons 1 (283) 481-NLCL (5567) Website: www.safehorizon.org/.National Suicide Prevention Lifeline 3 (858) 704-8511/.  [employer]  1 (439) LIFENET (199-0687)/.  [facility]’s Behavioral Health Crisis Center  [address] [place] [address] [phone]   Hours:  Monday through Friday from 9 AM to 3 PM/.  [employer].S[employer] Dept of  Affairs - Veterans Crisis Line  4 [phone], Option 1

## 2022-08-22 VITALS
RESPIRATION RATE: 18 BRPM | SYSTOLIC BLOOD PRESSURE: 107 MMHG | DIASTOLIC BLOOD PRESSURE: 72 MMHG | HEART RATE: 85 BPM | OXYGEN SATURATION: 100 % | TEMPERATURE: 98 F

## 2022-08-22 PROCEDURE — 80061 LIPID PANEL: CPT

## 2022-08-22 PROCEDURE — 87591 N.GONORRHOEAE DNA AMP PROB: CPT

## 2022-08-22 PROCEDURE — 81025 URINE PREGNANCY TEST: CPT

## 2022-08-22 PROCEDURE — 80053 COMPREHEN METABOLIC PANEL: CPT

## 2022-08-22 PROCEDURE — 83036 HEMOGLOBIN GLYCOSYLATED A1C: CPT

## 2022-08-22 PROCEDURE — 74019 RADEX ABDOMEN 2 VIEWS: CPT

## 2022-08-22 PROCEDURE — 83690 ASSAY OF LIPASE: CPT

## 2022-08-22 PROCEDURE — 81003 URINALYSIS AUTO W/O SCOPE: CPT

## 2022-08-22 PROCEDURE — U0003: CPT

## 2022-08-22 PROCEDURE — 83735 ASSAY OF MAGNESIUM: CPT

## 2022-08-22 PROCEDURE — U0005: CPT

## 2022-08-22 PROCEDURE — 85027 COMPLETE CBC AUTOMATED: CPT

## 2022-08-22 PROCEDURE — 87389 HIV-1 AG W/HIV-1&-2 AB AG IA: CPT

## 2022-08-22 PROCEDURE — 84100 ASSAY OF PHOSPHORUS: CPT

## 2022-08-22 PROCEDURE — 84443 ASSAY THYROID STIM HORMONE: CPT

## 2022-08-22 PROCEDURE — 87491 CHLMYD TRACH DNA AMP PROBE: CPT

## 2022-08-22 PROCEDURE — 36415 COLL VENOUS BLD VENIPUNCTURE: CPT

## 2022-08-22 PROCEDURE — 99239 HOSP IP/OBS DSCHRG MGMT >30: CPT

## 2022-08-22 PROCEDURE — 86780 TREPONEMA PALLIDUM: CPT

## 2022-08-22 PROCEDURE — 86769 SARS-COV-2 COVID-19 ANTIBODY: CPT

## 2022-08-22 RX ORDER — FLUOXETINE HCL 10 MG
5 CAPSULE ORAL
Qty: 150 | Refills: 0
Start: 2022-08-22 | End: 2022-09-20

## 2022-08-22 RX ADMIN — Medication 1 MILLIGRAM(S): at 10:22

## 2022-08-22 RX ADMIN — Medication 20 MILLIGRAM(S): at 10:22

## 2022-08-26 DIAGNOSIS — K30 FUNCTIONAL DYSPEPSIA: ICD-10-CM

## 2022-08-26 DIAGNOSIS — F06.1 CATATONIC DISORDER DUE TO KNOWN PHYSIOLOGICAL CONDITION: ICD-10-CM

## 2022-08-26 DIAGNOSIS — R45.1 RESTLESSNESS AND AGITATION: ICD-10-CM

## 2022-08-26 DIAGNOSIS — F41.0 PANIC DISORDER [EPISODIC PAROXYSMAL ANXIETY]: ICD-10-CM

## 2022-08-26 DIAGNOSIS — Z91.410 PERSONAL HISTORY OF ADULT PHYSICAL AND SEXUAL ABUSE: ICD-10-CM

## 2022-08-26 DIAGNOSIS — R45.851 SUICIDAL IDEATIONS: ICD-10-CM

## 2022-08-26 DIAGNOSIS — F29 UNSPECIFIED PSYCHOSIS NOT DUE TO A SUBSTANCE OR KNOWN PHYSIOLOGICAL CONDITION: ICD-10-CM

## 2022-08-26 DIAGNOSIS — Z91.14 PATIENT'S OTHER NONCOMPLIANCE WITH MEDICATION REGIMEN: ICD-10-CM

## 2022-08-26 DIAGNOSIS — R63.8 OTHER SYMPTOMS AND SIGNS CONCERNING FOOD AND FLUID INTAKE: ICD-10-CM

## 2022-08-26 DIAGNOSIS — R73.03 PREDIABETES: ICD-10-CM

## 2022-08-26 DIAGNOSIS — Z53.20 PROCEDURE AND TREATMENT NOT CARRIED OUT BECAUSE OF PATIENT'S DECISION FOR UNSPECIFIED REASONS: ICD-10-CM

## 2022-08-26 DIAGNOSIS — G47.00 INSOMNIA, UNSPECIFIED: ICD-10-CM

## 2022-08-26 DIAGNOSIS — F41.9 ANXIETY DISORDER, UNSPECIFIED: ICD-10-CM

## 2022-08-26 DIAGNOSIS — Z56.89 OTHER PROBLEMS RELATED TO EMPLOYMENT: ICD-10-CM

## 2022-08-26 DIAGNOSIS — F12.10 CANNABIS ABUSE, UNCOMPLICATED: ICD-10-CM

## 2022-08-26 DIAGNOSIS — K59.00 CONSTIPATION, UNSPECIFIED: ICD-10-CM

## 2022-08-26 DIAGNOSIS — F33.3 MAJOR DEPRESSIVE DISORDER, RECURRENT, SEVERE WITH PSYCHOTIC SYMPTOMS: ICD-10-CM

## 2022-08-26 DIAGNOSIS — R14.1 GAS PAIN: ICD-10-CM

## 2022-08-26 NOTE — BH SOCIAL WORK CONFIRMATION FOLLOW UP NOTE - NSLINKEDTOLOC_PSY_ALL_CORE
[facility] [address] [place], [place], [address]  [phone]

## 2022-08-26 NOTE — BH SOCIAL WORK CONFIRMATION FOLLOW UP NOTE - NSCOMMENTS_PSY_ALL_CORE
Linkage call made to clinic above who confirmed pt attended their intake appointment. Per census, pt low SI risk, no caring call needed.

## 2023-06-10 NOTE — BH TREATMENT PLAN - NSTXPSYCHOGOAL_PSY_ALL_CORE
Will ask for PRN medication to manage hallucinations
Will be able to report experiencing hallucinations to staff
MÓNICA Avendano RN
Will be able to report experiencing hallucinations to staff

## 2023-06-11 NOTE — BH TREATMENT PLAN - NSTXDEPRESGOAL_PSY_ALL_CORE
Exhibit improvements in self-grooming, hygiene, sleep and appetite
Other...
Other...
Attend and participate in at least 2 groups daily despite low mood/energy
Other...
spouse

## 2023-12-01 NOTE — BH PSYCHOLOGY - CLINICIAN PSYCHOTHERAPY NOTE - TOKEN PULL-DIAGNOSIS
Anesthesia Post-op Note    Patient: Zeus Finney  Procedure(s) Performed: COLONOSCOPY  Anesthesia type: MAC    Vitals Value Taken Time   Temp 36.1 °C (97 °F) 12/01/23 1007   Pulse 60 12/01/23 1020   Resp 16 12/01/23 1020   SpO2 96 % 12/01/23 1020   /79 12/01/23 1020         Patient Location: Phase II  Post-op Vital Signs:stable  Level of Consciousness: participates in exam, awake and oriented  Respiratory Status: spontaneous ventilation and room air  Cardiovascular stable  Hydration: euvolemic  Pain Management: well controlled  Handoff: Handoff to receiving clinician was performed and questions were answered  Vomiting: none  Nausea: None  Airway Patency:patent  Post-op Assessment: awake, alert, appropriately conversant, or baseline, no complications, patient tolerated procedure well, dentition within defined limits and moving all extremities      No notable events documented.                     Primary Diagnosis:  Major depressive disorder with psychotic features [F32.3]        Problem Dx:

## 2024-02-29 ENCOUNTER — INPATIENT (INPATIENT)
Facility: HOSPITAL | Age: 24
LOS: 41 days | Discharge: HOME | DRG: 885 | End: 2024-04-11
Attending: PSYCHIATRY & NEUROLOGY | Admitting: PSYCHIATRY & NEUROLOGY
Payer: MEDICAID

## 2024-03-01 VITALS
RESPIRATION RATE: 19 BRPM | DIASTOLIC BLOOD PRESSURE: 86 MMHG | TEMPERATURE: 100 F | HEART RATE: 92 BPM | OXYGEN SATURATION: 100 % | SYSTOLIC BLOOD PRESSURE: 121 MMHG

## 2024-03-01 PROBLEM — J45.909 UNSPECIFIED ASTHMA, UNCOMPLICATED: Chronic | Status: ACTIVE | Noted: 2022-07-18

## 2024-03-01 PROCEDURE — 99223 1ST HOSP IP/OBS HIGH 75: CPT

## 2024-03-01 RX ADMIN — Medication 1 MILLIGRAM(S): at 15:11

## 2024-03-01 RX ADMIN — Medication 2 MILLIGRAM(S): at 21:24

## 2024-03-01 NOTE — BH INPATIENT PSYCHIATRY ASSESSMENT NOTE - DESCRIPTION
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

## 2024-03-01 NOTE — BH INPATIENT PSYCHIATRY ASSESSMENT NOTE - NSBHCONSBHPROVCNTCTNOFT_PSY_A_CORE
Patient not connected to outpatient psychiatrist.  Documents received from [facility], where she was before admission to [facility].

## 2024-03-01 NOTE — BH PATIENT PROFILE - FALL HARM RISK - UNIVERSAL INTERVENTIONS
Bed in lowest position, wheels locked, appropriate side rails in place/Call bell, personal items and telephone in reach/Instruct patient to call for assistance before getting out of bed or chair/Non-slip footwear when patient is out of bed/[place] to call system/Physically safe environment - no spills, clutter or unnecessary equipment/Purposeful Proactive Rounding/Room/bathroom lighting operational, light cord in reach

## 2024-03-01 NOTE — BH INPATIENT PSYCHIATRY ASSESSMENT NOTE - NSBHCHARTREVIEWVS_PSY_A_CORE FT
Vital Signs Last 24 Hrs  T(C): 36.7 (03-01-24 @ 08:20), Max: 37.5 (03-01-24 @ 01:05)  T(F): 98 (03-01-24 @ 08:20), Max: 99.5 (03-01-24 @ 01:05)  HR: 109 (03-01-24 @ 08:20) (92 - 109)  BP: 112/75 (03-01-24 @ 08:20) (112/75 - 121/86)  BP(mean): --  RR: 18 (03-01-24 @ 08:20) (18 - 19)  SpO2: 99% (03-01-24 @ 08:20) (99% - 100%)     Vital Signs Last 24 Hrs  T(C): 36.7 (03-01-24 @ 17:20), Max: 37.5 (03-01-24 @ 01:05)  T(F): 98 (03-01-24 @ 17:20), Max: 99.5 (03-01-24 @ 01:05)  HR: 85 (03-01-24 @ 17:20) (85 - 109)  BP: 103/69 (03-01-24 @ 17:20) (103/69 - 121/86)  BP(mean): --  RR: 18 (03-01-24 @ 08:20) (18 - 19)  SpO2: 96% (03-01-24 @ 17:20) (96% - 100%)

## 2024-03-01 NOTE — BH INPATIENT PSYCHIATRY ASSESSMENT NOTE - NSBHMETABOLIC_PSY_ALL_CORE_FT
BMI:   HbA1c:   Glucose:   BP: 112/75 (03-01-24 @ 08:20) (112/75 - 121/86)Vital Signs Last 24 Hrs  T(C): 36.7 (03-01-24 @ 08:20), Max: 37.5 (03-01-24 @ 01:05)  T(F): 98 (03-01-24 @ 08:20), Max: 99.5 (03-01-24 @ 01:05)  HR: 109 (03-01-24 @ 08:20) (92 - 109)  BP: 112/75 (03-01-24 @ 08:20) (112/75 - 121/86)  BP(mean): --  RR: 18 (03-01-24 @ 08:20) (18 - 19)  SpO2: 99% (03-01-24 @ 08:20) (99% - 100%)      Lipid Panel:  BMI:   HbA1c:   Glucose:   BP: 103/69 (03-01-24 @ 17:20) (103/69 - 121/86)Vital Signs Last 24 Hrs  T(C): 36.7 (03-01-24 @ 17:20), Max: 37.5 (03-01-24 @ 01:05)  T(F): 98 (03-01-24 @ 17:20), Max: 99.5 (03-01-24 @ 01:05)  HR: 85 (03-01-24 @ 17:20) (85 - 109)  BP: 103/69 (03-01-24 @ 17:20) (103/69 - 121/86)  BP(mean): --  RR: 18 (03-01-24 @ 08:20) (18 - 19)  SpO2: 96% (03-01-24 @ 17:20) (96% - 100%)      Lipid Panel:

## 2024-03-01 NOTE — BH PATIENT PROFILE - HOME MEDICATIONS
FLUoxetine 20 mg/5 mL oral solution , 5 milliliter(s) orally once a day   Ativan 1 mg oral tablet , 1 tab(s) orally 2 times a day MDD:2 mg  FLUoxetine 20 mg/5 mL oral solution , 5 milliliter(s) orally once a day   Ativan 1 mg oral tablet , 1 tab(s) orally 2 times a day MDD:2 mg  FLUoxetine 20 mg/5 mL oral solution , 5 milliliter(s) orally once a day   LORazepam 1 mg oral tablet , 1 tab(s) orally every 12 hours  FLUoxetine 20 mg/5 mL oral solution , 5 milliliter(s) orally once a day

## 2024-03-01 NOTE — BH INPATIENT PSYCHIATRY ASSESSMENT NOTE - HPI (INCLUDE ILLNESS QUALITY, SEVERITY, DURATION, TIMING, CONTEXT, MODIFYING FACTORS, ASSOCIATED SIGNS AND SYMPTOMS)
Ms. 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     The patient is interviewed at bedside.  She is reclining in bed with her back relaxed against the back wall and her arms folded across her stomach.  She is calm, dysphoric, makes intermittent eye contact (overall quality low), has speech latency, often replies with head nods yes/no, and has poverty of speech, sometimes requiring questions to be re-asked.    The patient reports mood as "okay," but then later mentions she is "sad." She reports she does not remember how she ended up in the hospital, saying that she just remembers "being dizzy" and "sleeping a lot."  She says at home she spent most of her time pacing before coming to the hospital.  She cannot identify other task, activities, or jobs she did just prior to admission to the hospital.  She is unable to provide a trigger or life stressor that prompted her to start feeling sad and dizzy.  However, when pressed about relationships, she does say she recently broke up with her boyfriend.  Notably, when mentioning her ex-boyfriend she become more closed off in body posture and avoided eye contact by staring down.    She confirms depressed mood, but denies anxiety or manic symptoms.  She reports decrease eating and increased sleeping.  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  Denies current and past suicidal ideation and self-harm.  Denies current and past HI.  Reports history of AH, but denies VH.  Says she is not have AH, but did in the past, well before the present admission.  She does not provide specifics on the quality of the AH when pressed.  States that she uses cannabis daily, and alcohol (~2 drinks), but denies other substance use. Reports she does not follow with a psychiatrist and was prior admitted to [facility] but did not follow-up with any provider or take the medications prescribed to her.  Ms. 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     The patient is interviewed at bedside.  She is reclining in bed with her back relaxed against the back wall and her arms folded across her stomach.  She is calm, dysphoric, makes intermittent eye contact (overall quality low), has speech latency, often replies with head nods yes/no, and has poverty of speech, sometimes requiring questions to be re-asked.    The patient reports mood as "okay," but then later mentions she is "sad." She reports she does not remember how she ended up in the hospital, saying that she just remembers "being dizzy" and "sleeping a lot."  She says at home she spent most of her time pacing before coming to the hospital.  She cannot identify other task, activities, or jobs she did just prior to admission to the hospital.  She is unable to provide a trigger or life stressor that prompted her to start feeling sad and dizzy.  However, when pressed about relationships, she does say she recently broke up with her boyfriend.  Notably, when mentioning her ex-boyfriend she become more closed off in body posture and avoided eye contact by staring down.    She confirms depressed mood, but denies anxiety or manic symptoms.  She reports decrease eating and increased sleeping.  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  Denies current and past suicidal ideation and self-harm.  Denies current and past HI.  Reports history of AH, but denies VH.  Says she is not have AH, but did in the past, well before the present admission.  She does not provide specifics on the quality of the AH when pressed.  States that she uses cannabis daily, and alcohol (~2 drinks), but denies other substance use. Reports she does not follow with a psychiatrist and was prior admitted to [facility] but did not follow-up with any provider or take the medications prescribed to her.     Later, about an hour after the patient received Ativan 1mg intramuscular for catatonia, the patient is more talkative, initiating questions about the medication.  She is sitting upright on the side of the bed and reports feeling "better," while denying dizziness and stomach aches.  She also has a tube of vaseline in her hand and is applying it to dry skin.  Her eye contact is also improved and speech latency absent.
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
## 2024-03-01 NOTE — BH INPATIENT PSYCHIATRY ASSESSMENT NOTE - RISK ASSESSMENT
Low risk of SI, no reported SI current or past, no SA, supportive family is protective, risk is acute MDD psychiatric decompensation

## 2024-03-01 NOTE — ED BEHAVIORAL HEALTH NOTE - BEHAVIORAL HEALTH NOTE
Patient interviewed and part B completed at [facility]. Orders have been entered and handoff provided to inpatient RN and Inpatient MD Lund. Full ED BH Assessment is located in MRN [id number] in [place] [employer] EMR.

## 2024-03-01 NOTE — BH SOCIAL WORK INITIAL PSYCHOSOCIAL EVALUATION - OTHER PAST PSYCHIATRIC HISTORY (INCLUDE DETAILS REGARDING ONSET, COURSE OF ILLNESS, INPATIENT/OUTPATIENT TREATMENT)
Patient is a 23 year old female, single, domiciled with mother and two siblings, unemployed (formerly employed as a HHA, , and school para), with PMH of asthma and PPH of MDD w/ catatonic and psychotic features, ODD, and cannabis use and prior admission to [facility] (7/17/22-8/22/22 discharged on Ativan for catatonia and Prozac for depressed mood) who was brought in by mother to [facility] before transfer to [facility] for tearfulness, decreased activity, and decreased appetite. She was admitted on 3/1/24 for MDD with catatonic features.

## 2024-03-01 NOTE — BH INPATIENT PSYCHIATRY ASSESSMENT NOTE - OTHER PAST PSYCHIATRIC HISTORY (INCLUDE DETAILS REGARDING ONSET, COURSE OF ILLNESS, INPATIENT/OUTPATIENT TREATMENT)
MDD w/ catatonic and psychotic features, ODD, and cannabis use and prior admission to [facility] (7/17/22-8/22/22 discharged on Ativan for catatonia and Prozac for depressed mood)

## 2024-03-01 NOTE — BH INPATIENT PSYCHIATRY ASSESSMENT NOTE - MSE UNSTRUCTURED FT
Appearance: appears stated age, mildly disheveled  Behavior: slow to move, reclining in bed, poor but intermittent eye contact, cooperative though superficial at times  Psychomotor: +PMR  Speech: speech latency, reduce rate, reduced volume, paucity of speech, monotone   Mood: "okay...sad"  Affect: mood congruent, dysphoric, stable, constricted, monotone  Thought process: poverty of speech though linear and logical when she does form sentences  Thought content: somatic feelings of being unwell, denies SI/SA/HI  Perception: denies current AVH, but reports past AH\  Memory: impaired, not formally tested  Concentration: impaired, not formally tested  Insight: poor  Judgement: poor  Impulse Control: appropriate to setting

## 2024-03-01 NOTE — BH TREATMENT PLAN - NSTXPATIENTAGREEMENT_PSY_ALL_CORE
Uti treated with iv cipro.  Will be discharged on po cipro.  Afebrile.  Continues to c/o back pain, abdominal pain, mother stating a little better since golytely stopped.  Golytely completed with follow up xray.  Viewed by Dr. Brown, clear of stool.  Will allow to be discharged to home under mother's care.  Mother asking for pain control at home.  Dr. SHARMA discussed options with mother, mainly if pain is intolerable she needs to bring her to 's office.  Discharge instructions given to mother, she verbalizes understanding of all instructions. Cipro delivered to bedside by our outpatient pharmacy, checked for accuracy.        No

## 2024-03-01 NOTE — BH INPATIENT PSYCHIATRY ASSESSMENT NOTE - CURRENT MEDICATION
MEDICATIONS  (STANDING):  LORazepam     Tablet 2 milliGRAM(s) Oral two times a day    MEDICATIONS  (PRN):  LORazepam     Tablet 1 milliGRAM(s) Oral every 6 hours PRN agitation   MEDICATIONS  (STANDING):  LORazepam     Tablet 2 milliGRAM(s) Oral two times a day  LORazepam   Injectable 1 milliGRAM(s) IntraMuscular once    MEDICATIONS  (PRN):  LORazepam     Tablet 1 milliGRAM(s) Oral every 6 hours PRN agitation

## 2024-03-01 NOTE — BH INPATIENT PSYCHIATRY ASSESSMENT NOTE - ACCESS TO FIREARM
Group Lifestyle Balance Follow-up Progress Note      Subjective     Patient is here for group medical appointment for Group Lifestyle Balance weight management program follow-up for the chronic conditions of HTN, Hypothyroidism, COPD, XAVIER, Bipolar Disorder, Asthma, Pulmonary HTN, GERD, HLD, Hx Stroke. Patient continues on the program and tolerating well. Total weight gain of 8 lbs. No current issues. Allergies: Allergies   Allergen Reactions    Motrin [Ibuprofen]     Aspirin Rash    Blue Dyes (Parenteral) Rash    Hctz [Hydrochlorothiazide] Rash     Fixed drug reaction    Sulfa Antibiotics Rash    Tetracyclines & Related Rash       Past Medical History:     Past Medical History:   Diagnosis Date    Anxiety     Arrhythmia     Asthma     Bipolar disorder (HCC)     Depression     GERD (gastroesophageal reflux disease)     GERD (gastroesophageal reflux disease)     Hyperlipidemia     Hypertension     Hypothyroidism     OAB (overactive bladder)     Osteoarthritis     Pulmonary fibrosis (HCC)     sjogrens syndrome    Pulmonary hypertension (HCC)     Stroke (HCC)     Unspecified sleep apnea     on cpap    Urinary incontinence    . Past Surgical History:  Past Surgical History:   Procedure Laterality Date    COLONOSCOPY  04/2015    COLONOSCOPY  2018           Family History:  Family History   Problem Relation Age of Onset    Arthritis Mother     Depression Mother     Heart Disease Mother     High Blood Pressure Father     High Cholesterol Father     Stroke Father     High Blood Pressure Brother        Social History:  Social History     Socioeconomic History    Marital status:       Spouse name: Not on file    Number of children: Not on file    Years of education: Not on file    Highest education level: Not on file   Occupational History    Not on file   Social Needs    Financial resource strain: Not on file    Food insecurity:     Worry: Not on file Inability: Not on file    Transportation needs:     Medical: Not on file     Non-medical: Not on file   Tobacco Use    Smoking status: Never Smoker    Smokeless tobacco: Never Used   Substance and Sexual Activity    Alcohol use: No    Drug use: No    Sexual activity: Not on file   Lifestyle    Physical activity:     Days per week: Not on file     Minutes per session: Not on file    Stress: Not on file   Relationships    Social connections:     Talks on phone: Not on file     Gets together: Not on file     Attends Zoroastrian service: Not on file     Active member of club or organization: Not on file     Attends meetings of clubs or organizations: Not on file     Relationship status: Not on file    Intimate partner violence:     Fear of current or ex partner: Not on file     Emotionally abused: Not on file     Physically abused: Not on file     Forced sexual activity: Not on file   Other Topics Concern    Not on file   Social History Narrative    Not on file       Current Medications:  Current Outpatient Medications   Medication Sig Dispense Refill    levothyroxine (SYNTHROID) 25 MCG tablet TAKE ONE TABLET BY MOUTH ONCE DAILY **CALL  FOR  APPOINTMENT  BEFORE  ANY  OTHER  REFILLS** 30 tablet 2    amLODIPine (NORVASC) 10 MG tablet TAKE 1 TABLET BY MOUTH ONCE DAILY 90 tablet 1    mometasone (NASONEX) 50 MCG/ACT nasal spray 2 sprays by Nasal route daily 1 spray each nostril daily      ranitidine (ZANTAC) 150 MG tablet Take 150 mg by mouth 2 times daily      cephALEXin (KEFLEX) 500 MG capsule Take 500 mg by mouth 2 times daily Indications: prescribed by dermatologist      valACYclovir (VALTREX) 1 g tablet Take 1,000 mg by mouth daily      albuterol sulfate HFA (PROAIR HFA) 108 (90 Base) MCG/ACT inhaler Inhale 2 puffs into the lungs 2 times daily as needed for Wheezing (no more than 4 times daily)      montelukast (SINGULAIR) 10 MG tablet Take 10 mg by mouth nightly      budesonide-formoterol (SYMBICORT) 160-4.5 MCG/ACT AERO Inhale 2 puffs into the lungs 2 times daily      rifaximin (XIFAXAN) 550 MG tablet Take 550 mg by mouth every 8 hours      acetaminophen (TYLENOL) 325 MG tablet Take 2 tablets by mouth every 6 hours as needed for Pain 60 tablet 0    FLUoxetine (PROZAC) 40 MG capsule Take 40 mg by mouth daily      ipratropium (ATROVENT) 0.03 % nasal spray 2 sprays by Nasal route 3 times daily as needed for Rhinitis       lithium 300 MG capsule Take 300 mg by mouth 2 times daily (with meals).  QUEtiapine (SEROQUEL) 300 MG tablet Take 300 mg by mouth 3 times daily        No current facility-administered medications for this visit. Vital Signs:  /70 (Site: Right Upper Arm, Position: Sitting, Cuff Size: Large Adult)   Pulse 68   Ht 5' 4.02\" (1.626 m)   Wt 195 lb 1.6 oz (88.5 kg)   BMI 33.47 kg/m²     BMI/Height/Weight:  Body mass index is 33.47 kg/m². Review of Systems  Constitutional: Weight gain      Objective:      Physical Exam   Vital signs reviewed. General Appearance: Well-developed and well-nourished. No acute distress. Skin: Warm, dry. Head: Normocephalic. Pulmonary/Chest: Normal respiratory effort. Musculoskeletal: Movement x4. Neurological:  Alert and oriented. Individual goal for this encounter:  I would like to become more active and start eating the right foods. [x] Vital signs reviewed and discussed with patient.   [] Labs/EKG reviewed and discussed with patient. [] Blood sugar log reviewed and discussed with patient. [] Physical activity discussed. [] Medication changes recommended. [] Smoking cessation discussed. [x] Specific questions/concerns addressed. Specific group medical question(s) addressed in this encounter:  Discussion about metabolism.     Group discussion topic for this encounter:     [] Welcome Jumpstart   [] Be a Fat & Calorie    [] Healthy Eating   [] Move Those Muscles   [] Tip the Calorie Balance   [] Take charge of What's Around Jimena Blanco   [] Problem Solving   [] Four Keys to Healthy Eating Out   [] Slippery Beckham of Lifestyle Change   [] Jumpstart your Activity Plan   [] Make Social Cues Work for Jimena Blanco   [] Ways to Stay Motivated   [] Preparing for Long Term Self-Management   [] More Volume, Fewer Calories   [x] Balance Your Thoughts   [] Strengthen your Exercise Program   [] Mindful Eating   [] Stress and Time Managment   [] Standing Up for Your Health   [] Heart Health   [] Stretching:  The Truth About Flexibility   [] Looking Back and Looking Forward    Total time:  90 minutes, greater than 50% of visit spent in group counseling/education. Assessment:       Diagnosis Orders   1. Essential hypertension     2. Pure hypercholesterolemia     3. Chronic obstructive pulmonary disease, unspecified COPD type ([facility] [address]     4. Obstructive sleep apnea syndrome     5. Gastroesophageal reflux disease, esophagitis presence not specified     6. Hypothyroidism, unspecified type     7. Mild persistent asthma without complication     8. History of stroke     9. Bipolar 1 disorder ([facility] [address]     10. Obesity (BMI 30-39. 9)         Plan:      Track food and weight. Return to clinic as per group medical appointment schedule. Follow-up:  Return in about 1 month (around 7/4/2019). Orders:  No orders of the defined types were placed in this encounter. Prescriptions:  No orders of the defined types were placed in this encounter.       Electronically signed by:  Ra Paz CNP No

## 2024-03-01 NOTE — BH INPATIENT PSYCHIATRY ASSESSMENT NOTE - NSBHASSESSSUMMFT_PSY_ALL_CORE
Ms. 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 (7/17/22-8/22/22 discharged on Ativan for catatonia and Prozac for depressed mood) who presented brought in by mother to [facility] before transfer to [facility] for tearfulness, decreased activity, and decreased appetite.    The patient's presentation is most consistent with recurrent MDD with catatonic features (severe) in the context of medication non-adherence.  The patient reports depressed mood, decrease in appetite,  Ms. 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    The patient's presentation is most consistent with recurrent MDD with catatonic features (severe) in the context of medication non-adherence.  The patient reports depressed mood, decrease in appetite, change in sleep (increased), psychomotor retardation, somatic symptoms, decreased interest in activities and concentration.  Moreover, patient has catatonic features of staring/poor eye contact, poverty of speech, decrease mobility, and withdrawal.  Given history or MDD w/ catatonic features, suspect the patient has decompensated and would respond well to Ativan as she did during her last [facility] admission.  Maternal engagement resulted in temporary better medication adherence so will involve the mother closely in case.  Although depressed mood, patient is low SA risk given no prior history and no current thoughts of SI.  However, will continue to assess for degree of depression and SI once the patient's catatonia is addressed and improved.  The patient necessitates inpatient psychiatric admission and continued care due to danger to self as she is unable to care for basic needs in her current state.    Plan:  - Start Ativan 1mg po BID and monitor for necessity of increasing or decreasing dose frequency and amount  - Continue to monitor depression symptoms after addition of Ativan and consider initiating and agent to address low mood if not improving concurrently with catatonic features while on Ativan  - Encourage the patient to engage in therapeutic activities on the unit

## 2024-03-02 PROCEDURE — 99231 SBSQ HOSP IP/OBS SF/LOW 25: CPT

## 2024-03-02 RX ORDER — FLUOXETINE HCL 10 MG
10 CAPSULE ORAL ONCE
Refills: 0 | Status: DISCONTINUED | OUTPATIENT
Start: 2024-03-02 | End: 2024-03-03

## 2024-03-02 RX ORDER — ONDANSETRON 8 MG/1
4 TABLET, FILM COATED ORAL
Refills: 0 | Status: DISCONTINUED | OUTPATIENT
Start: 2024-03-02 | End: 2024-04-04

## 2024-03-02 RX ADMIN — Medication 2 MILLIGRAM(S): at 09:54

## 2024-03-02 RX ADMIN — ONDANSETRON 4 MILLIGRAM(S): 8 TABLET, FILM COATED ORAL at 17:02

## 2024-03-02 RX ADMIN — Medication 2 MILLIGRAM(S): at 22:36

## 2024-03-02 NOTE — BH INPATIENT PSYCHIATRY PROGRESS NOTE - PRN MEDS
MEDICATIONS  (PRN):  LORazepam    Concentrate 1 milliGRAM(s) Oral every 6 hours PRN agitation, anxiety

## 2024-03-02 NOTE — BH INPATIENT PSYCHIATRY PROGRESS NOTE - NSBHCHARTREVIEWVS_PSY_A_CORE FT
Vital Signs Last 24 Hrs  T(C): 36.7 (03-01-24 @ 17:20), Max: 36.7 (03-01-24 @ 17:20)  T(F): 98 (03-01-24 @ 17:20), Max: 98 (03-01-24 @ 17:20)  HR: 103 (03-01-24 @ 21:23) (85 - 103)  BP: 109/74 (03-01-24 @ 21:23) (103/69 - 109/74)  BP(mean): --  RR: 16 (03-01-24 @ 21:23) (16 - 16)  SpO2: 99% (03-01-24 @ 21:23) (96% - 99%)

## 2024-03-02 NOTE — BH INPATIENT PSYCHIATRY PROGRESS NOTE - NSBHMETABOLIC_PSY_ALL_CORE_FT
BMI:   HbA1c:   Glucose:   BP: 109/74 (03-01-24 @ 21:23) (103/69 - 121/86)Vital Signs Last 24 Hrs  T(C): 36.7 (03-01-24 @ 17:20), Max: 36.7 (03-01-24 @ 17:20)  T(F): 98 (03-01-24 @ 17:20), Max: 98 (03-01-24 @ 17:20)  HR: 103 (03-01-24 @ 21:23) (85 - 103)  BP: 109/74 (03-01-24 @ 21:23) (103/69 - 109/74)  BP(mean): --  RR: 16 (03-01-24 @ 21:23) (16 - 16)  SpO2: 99% (03-01-24 @ 21:23) (96% - 99%)      Lipid Panel:

## 2024-03-02 NOTE — BH INPATIENT PSYCHIATRY PROGRESS NOTE - NSBHFUPINTERVALHXFT_PSY_A_CORE
On evaluation, patient is cooperative with significant psychomotor retardation. She states that she has trouble swallowing pills at times, and would prefer liquid. She asks if the medication can cause dizziness, as she has been feeling dizzy. However, when asked, she states that she has been feeling dizzy even prior to arrival here. She asks if the medication would cause her to move slowly. Writer explained that the purpose of the medication is to help her with this problem. She admits to feeling depressed for a long time and crying frequently daily. She states that she was not eating at all at home, but ate a bit today. She states that her appetite is improving but is not back to normal. She denies any AH, VH, SI, or HI. She recalls taking antidepressants previously but does not recall which. She is willing to take one now. Writer explained the risks and benefits of Prozac.

## 2024-03-02 NOTE — BH INPATIENT PSYCHIATRY PROGRESS NOTE - NSBHASSESSSUMMFT_PSY_ALL_CORE
Patient is a 23 year old female, single, domiciled with mother and two siblings, unemployed (formerly employed as a HHA, , and school para), with PMH of asthma and PPH of MDD w/ catatonic and psychotic features, ODD, and cannabis use and prior admission to [facility] (7/17/22-8/22/22 discharged on Ativan for catatonia and Prozac for depressed mood) who was brought in by mother to [facility] before transfer to [facility] for tearfulness, decreased activity, and decreased appetite. She was admitted on 3/1/24 for MDD with catatonic features.     On evaluation today, patient is cooperative with continued psychomotor retardation but appears less catatonic than on presentation yesterday. She is receptive to starting an antidepressant today for depressive symptoms. Restart Prozac and continue to monitor improvement in catatonia.     Plan:  - Change Ativan 1mg po BID to liquid and monitor for necessity of increasing or decreasing dose frequency and amount  - Start liquid Prozac 10 mg QD for depression  - Encourage the patient to engage in therapeutic activities on the unit

## 2024-03-02 NOTE — BH INPATIENT PSYCHIATRY PROGRESS NOTE - CURRENT MEDICATION
MEDICATIONS  (STANDING):  FLUoxetine Solution 10 milliGRAM(s) Oral once  LORazepam    Concentrate 2 milliGRAM(s) Oral two times a day    MEDICATIONS  (PRN):  LORazepam    Concentrate 1 milliGRAM(s) Oral every 6 hours PRN agitation, anxiety

## 2024-03-03 PROCEDURE — 99231 SBSQ HOSP IP/OBS SF/LOW 25: CPT

## 2024-03-03 RX ORDER — MECLIZINE HCL 12.5 MG
12.5 TABLET ORAL EVERY 6 HOURS
Refills: 0 | Status: DISCONTINUED | OUTPATIENT
Start: 2024-03-03 | End: 2024-04-04

## 2024-03-03 RX ORDER — FLUOXETINE HCL 10 MG
10 CAPSULE ORAL ONCE
Refills: 0 | Status: COMPLETED | OUTPATIENT
Start: 2024-03-03 | End: 2024-03-03

## 2024-03-03 RX ADMIN — Medication 10 MILLIGRAM(S): at 13:12

## 2024-03-03 RX ADMIN — ONDANSETRON 4 MILLIGRAM(S): 8 TABLET, FILM COATED ORAL at 10:36

## 2024-03-03 RX ADMIN — Medication 2 MILLIGRAM(S): at 10:17

## 2024-03-03 RX ADMIN — ONDANSETRON 4 MILLIGRAM(S): 8 TABLET, FILM COATED ORAL at 15:35

## 2024-03-03 NOTE — BH INPATIENT PSYCHIATRY PROGRESS NOTE - PRN MEDS
MEDICATIONS  (PRN):  aluminum hydroxide/magnesium hydroxide/simethicone Suspension 30 milliLiter(s) Oral every 6 hours PRN Dyspepsia  LORazepam    Concentrate 1 milliGRAM(s) Oral every 6 hours PRN agitation, anxiety  ondansetron   Disintegrating Tablet 4 milliGRAM(s) Oral four times a day PRN nausea

## 2024-03-03 NOTE — BH INPATIENT PSYCHIATRY PROGRESS NOTE - NSBHMETABOLIC_PSY_ALL_CORE_FT
BMI:   HbA1c:   Glucose:   BP: 116/77 (03-03-24 @ 09:07) (103/69 - 124/80)Vital Signs Last 24 Hrs  T(C): 36.7 (03-03-24 @ 09:07), Max: 36.7 (03-03-24 @ 09:07)  T(F): 98 (03-03-24 @ 09:07), Max: 98 (03-03-24 @ 09:07)  HR: 90 (03-03-24 @ 09:07) (90 - 109)  BP: 116/77 (03-03-24 @ 09:07) (116/77 - 124/80)  BP(mean): --  RR: --  SpO2: 98% (03-03-24 @ 09:07) (98% - 98%)      Lipid Panel:

## 2024-03-03 NOTE — BH INPATIENT PSYCHIATRY PROGRESS NOTE - CURRENT MEDICATION
MEDICATIONS  (STANDING):    MEDICATIONS  (PRN):  aluminum hydroxide/magnesium hydroxide/simethicone Suspension 30 milliLiter(s) Oral every 6 hours PRN Dyspepsia  LORazepam    Concentrate 1 milliGRAM(s) Oral every 6 hours PRN agitation, anxiety  ondansetron   Disintegrating Tablet 4 milliGRAM(s) Oral four times a day PRN nausea

## 2024-03-03 NOTE — BH INPATIENT PSYCHIATRY PROGRESS NOTE - NSBHCHARTREVIEWVS_PSY_A_CORE FT
Vital Signs Last 24 Hrs  T(C): 36.7 (03-03-24 @ 09:07), Max: 36.7 (03-03-24 @ 09:07)  T(F): 98 (03-03-24 @ 09:07), Max: 98 (03-03-24 @ 09:07)  HR: 90 (03-03-24 @ 09:07) (90 - 109)  BP: 116/77 (03-03-24 @ 09:07) (116/77 - 124/80)  BP(mean): --  RR: --  SpO2: 98% (03-03-24 @ 09:07) (98% - 98%)

## 2024-03-03 NOTE — BH INPATIENT PSYCHIATRY PROGRESS NOTE - NSBHASSESSSUMMFT_PSY_ALL_CORE
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 She was admitted on 3/1/24 for MDD with catatonic features.     On evaluation today, patient is cooperative with slightly worse psychomotor retardation but improved mood per patient. She has good muscle tone without any waxy flexibility or posturing. She appears to have some difficulty with PO consumption, and has been reporting nausea and dizziness which she attributes to Ativan. Trial discontinuation of Ativan 1 mg BID liquid as patient's catatonia is significantly improved. Further collateral would be beneficial to better ascertain her baseline.     Plan:  - Trial discontinuation of Ativan 1 mg BID liquid as patient's catatonia is significantly improved.   - Start liquid Prozac 10 mg QD for depression (not given yesterday), increase to 20 mg if tolerated  - Start meclizine 12.5 mg Q6H PRN for dizziness  - Continue Zofran 4 mg QID PRN for nausea

## 2024-03-03 NOTE — BH INPATIENT PSYCHIATRY PROGRESS NOTE - NSBHFUPINTERVALHXFT_PSY_A_CORE
On evaluation, patient is calm and cooperative though with blunted affect and psychomotor retardation. She states that she feels better, as if she is back to normal. She reports okay mood. Writer remarks that she appears down, and she states that other people have said this to her, but she feels that she is fine. She does not feel that she is moving or speaking slowly. She is concerned that the Ativan is making her dizzier and weaker, and she would prefer to stop taking it. She states that she is not eating as well today as her appetite remains low. She denies any AH, VH, SI, or HI.     Of note, patient had difficulty this morning with liquid Ativan and struggled to take it due to the taste, despite drinking several cups of water. She had an episode of emesis yesterday after eating.

## 2024-03-04 PROCEDURE — 99232 SBSQ HOSP IP/OBS MODERATE 35: CPT

## 2024-03-04 RX ADMIN — ONDANSETRON 4 MILLIGRAM(S): 8 TABLET, FILM COATED ORAL at 11:07

## 2024-03-04 NOTE — BH INPATIENT PSYCHIATRY PROGRESS NOTE - NSBHCHARTREVIEWVS_PSY_A_CORE FT
Vital Signs Last 24 Hrs  T(C): 36.5 (03-03-24 @ 17:58), Max: 36.5 (03-03-24 @ 17:58)  T(F): 97.7 (03-03-24 @ 17:58), Max: 97.7 (03-03-24 @ 17:58)  HR: 78 (03-03-24 @ 17:58) (78 - 78)  BP: 106/74 (03-03-24 @ 17:58) (106/74 - 106/74)  BP(mean): --  RR: --  SpO2: 100% (03-03-24 @ 17:58) (100% - 100%)     Vital Signs Last 24 Hrs  T(C): 36.6 (03-04-24 @ 09:05), Max: 36.6 (03-04-24 @ 09:05)  T(F): 97.8 (03-04-24 @ 09:05), Max: 97.8 (03-04-24 @ 09:05)  HR: 79 (03-04-24 @ 09:05) (78 - 79)  BP: 111/74 (03-04-24 @ 09:05) (106/74 - 111/74)  BP(mean): --  RR: 18 (03-04-24 @ 09:05) (18 - 18)  SpO2: 100% (03-04-24 @ 09:05) (100% - 100%)     Vital Signs Last 24 Hrs  T(C): 36.6 (03-04-24 @ 17:41), Max: 36.6 (03-04-24 @ 09:05)  T(F): 97.9 (03-04-24 @ 17:41), Max: 97.9 (03-04-24 @ 17:41)  HR: 75 (03-04-24 @ 17:41) (75 - 79)  BP: 112/78 (03-04-24 @ 17:41) (111/74 - 112/78)  BP(mean): --  RR: 18 (03-04-24 @ 17:41) (18 - 18)  SpO2: 100% (03-04-24 @ 17:41) (100% - 100%)

## 2024-03-04 NOTE — BH INPATIENT PSYCHIATRY PROGRESS NOTE - CURRENT MEDICATION
MEDICATIONS  (STANDING):    MEDICATIONS  (PRN):  aluminum hydroxide/magnesium hydroxide/simethicone Suspension 30 milliLiter(s) Oral every 6 hours PRN Dyspepsia  LORazepam    Concentrate 1 milliGRAM(s) Oral every 6 hours PRN agitation, anxiety  meclizine 12.5 milliGRAM(s) Oral every 6 hours PRN dizziness  ondansetron   Disintegrating Tablet 4 milliGRAM(s) Oral four times a day PRN nausea   MEDICATIONS  (STANDING):      MEDICATIONS  (PRN):  aluminum hydroxide/magnesium hydroxide/simethicone Suspension 30 milliLiter(s) Oral every 6 hours PRN Dyspepsia  LORazepam    Concentrate 1 milliGRAM(s) Oral every 6 hours PRN agitation, anxiety  meclizine 12.5 milliGRAM(s) Oral every 6 hours PRN dizziness  ondansetron   Disintegrating Tablet 4 milliGRAM(s) Oral four times a day PRN nausea   MEDICATIONS  (STANDING):  LORazepam     Tablet 2 milliGRAM(s) Oral two times a day    MEDICATIONS  (PRN):  aluminum hydroxide/magnesium hydroxide/simethicone Suspension 30 milliLiter(s) Oral every 6 hours PRN Dyspepsia  LORazepam    Concentrate 1 milliGRAM(s) Oral every 6 hours PRN agitation, anxiety  meclizine 12.5 milliGRAM(s) Oral every 6 hours PRN dizziness  ondansetron   Disintegrating Tablet 4 milliGRAM(s) Oral four times a day PRN nausea

## 2024-03-04 NOTE — BH INPATIENT PSYCHIATRY PROGRESS NOTE - NSBHASSESSSUMMFT_PSY_ALL_CORE
Ms. 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    The patient's presentation is most consistent with recurrent MDD with catatonic features (severe) in the context of medication non-adherence.  The patient reports depressed mood, decrease in appetite, change in sleep (increased), psychomotor retardation, somatic symptoms, decreased interest in activities and concentration.  Moreover, patient has catatonic features of staring/poor eye contact, poverty of speech, decrease mobility, and withdrawal.  Given history or MDD w/ catatonic features, suspect the patient has decompensated and would respond well to Ativan as she did during her last [facility] admission.  Maternal engagement resulted in temporary better medication adherence so will involve the mother closely in case.  Although depressed mood, patient is low SA risk given no prior history and no current thoughts of SI.  However, will continue to assess for degree of depression and SI once the patient's catatonia is addressed and improved.  The patient necessitates inpatient psychiatric admission and continued care due to danger to self as she is unable to care for basic needs in her current state.    3/4 - Patient more visible on the unit and engaging during conversation.  Suspect the Ativan regimen is helping the patient.  He dizziness does not logically correlate to the medication, which suggests that may be more of a symptoms secondary to her MDD with catatonic/psychotic features.    Plan:  - Continue Ativan 2mg po BID for catatonia  - Continue to monitor depression symptoms   - Encourage the patient to engage in therapeutic activities on the unit

## 2024-03-04 NOTE — BH INPATIENT PSYCHIATRY PROGRESS NOTE - PRN MEDS
MEDICATIONS  (PRN):  aluminum hydroxide/magnesium hydroxide/simethicone Suspension 30 milliLiter(s) Oral every 6 hours PRN Dyspepsia  LORazepam    Concentrate 1 milliGRAM(s) Oral every 6 hours PRN agitation, anxiety  meclizine 12.5 milliGRAM(s) Oral every 6 hours PRN dizziness  ondansetron   Disintegrating Tablet 4 milliGRAM(s) Oral four times a day PRN nausea

## 2024-03-04 NOTE — BH INPATIENT PSYCHIATRY PROGRESS NOTE - NSBHMETABOLIC_PSY_ALL_CORE_FT
BMI:   HbA1c:   Glucose:   BP: 106/74 (03-03-24 @ 17:58) (103/69 - 124/80)Vital Signs Last 24 Hrs  T(C): 36.5 (03-03-24 @ 17:58), Max: 36.5 (03-03-24 @ 17:58)  T(F): 97.7 (03-03-24 @ 17:58), Max: 97.7 (03-03-24 @ 17:58)  HR: 78 (03-03-24 @ 17:58) (78 - 78)  BP: 106/74 (03-03-24 @ 17:58) (106/74 - 106/74)  BP(mean): --  RR: --  SpO2: 100% (03-03-24 @ 17:58) (100% - 100%)      Lipid Panel:  BMI:   HbA1c:   Glucose:   BP: 111/74 (03-04-24 @ 09:05) (103/69 - 124/80)Vital Signs Last 24 Hrs  T(C): 36.6 (03-04-24 @ 09:05), Max: 36.6 (03-04-24 @ 09:05)  T(F): 97.8 (03-04-24 @ 09:05), Max: 97.8 (03-04-24 @ 09:05)  HR: 79 (03-04-24 @ 09:05) (78 - 79)  BP: 111/74 (03-04-24 @ 09:05) (106/74 - 111/74)  BP(mean): --  RR: 18 (03-04-24 @ 09:05) (18 - 18)  SpO2: 100% (03-04-24 @ 09:05) (100% - 100%)      Lipid Panel:  BMI:   HbA1c:   Glucose:   BP: 112/78 (03-04-24 @ 17:41) (106/74 - 124/80)Vital Signs Last 24 Hrs  T(C): 36.6 (03-04-24 @ 17:41), Max: 36.6 (03-04-24 @ 09:05)  T(F): 97.9 (03-04-24 @ 17:41), Max: 97.9 (03-04-24 @ 17:41)  HR: 75 (03-04-24 @ 17:41) (75 - 79)  BP: 112/78 (03-04-24 @ 17:41) (111/74 - 112/78)  BP(mean): --  RR: 18 (03-04-24 @ 17:41) (18 - 18)  SpO2: 100% (03-04-24 @ 17:41) (100% - 100%)      Lipid Panel:

## 2024-03-04 NOTE — BH INPATIENT PSYCHIATRY PROGRESS NOTE - NSBHFUPINTERVALHXFT_PSY_A_CORE
The patient was adherent to lorazepam over the weekend and took Zofran multiple times for nausea/dizziness.  The patient is preservative on not wanting to take the medication and demonstrates no insight into how her activity level improves after receiving lorazepam.  She shares she thinks the dizziness is entirely related to the lorazepam injection she received on Friday.  She says this dizziness is different from the one she felt before coming to the hospital.  However, she then contradicts herself, saying it's all the same "fogginess" and "blurred vision" she had before ever receiving the Ativan.  Patient is open to starting an SSRI.  She reports good sleep and improving appetite.  Denies SI, HI, and AVH. The patient was adherent to lorazepam over the weekend and took Zofran multiple times for nausea/dizziness.  The patient is preservative on not wanting to take the medication and demonstrates no insight into how her activity level improves after receiving lorazepam.  She shares she thinks the dizziness is entirely related to the lorazepam injection she received on Friday.  She says this dizziness is different from the one she felt before coming to the hospital.  However, she then contradicts herself, saying it's all the same "fogginess" and "blurred vision" she had before ever receiving the Ativan.  She reports good sleep and improving appetite.  Denies SI, HI, and AVH.

## 2024-03-04 NOTE — BH INPATIENT PSYCHIATRY PROGRESS NOTE - NSTXPSYCHODATETRGT_PSY_ALL_CORE
25-Jul-2022
04-Aug-2022
04-Aug-2022
25-Jul-2022
25-Jul-2022
16-Aug-2022
18-Aug-2022
25-Jul-2022
25-Jul-2022
No
16-Aug-2022
25-Jul-2022
25-Jul-2022
04-Aug-2022
25-Jul-2022
18-Aug-2022
18-Aug-2022
11-Aug-2022
25-Jul-2022
04-Aug-2022
16-Aug-2022
16-Aug-2022
11-Aug-2022
25-Jul-2022
16-Aug-2022
04-Aug-2022
17-Aug-2022

## 2024-03-04 NOTE — BH INPATIENT PSYCHIATRY PROGRESS NOTE - MSE UNSTRUCTURED FT
Appearance: appears stated age, well groomed, hair tied back  Behavior: slow to move though faster than day prior, reclining in bed but sits up and stands during interview, intermittent eye contact, cooperative  Psychomotor: +PMR  Speech: mild speech latency, reduce rate, reduced volume, monotone   Mood: "good"  Affect: mood incongruent, dysphoric though improved from prior, stable, constricted, monotone  Thought process: linear and logical  Thought content: somatic feelings of being unwell, denies SI/SA/HI  Perception: denies current AVH, but reports past AH  Memory: impaired, not formally tested  Concentration: impaired, not formally tested  Insight: poor  Judgement: poor  Impulse Control: appropriate to setting

## 2024-03-05 PROCEDURE — 99232 SBSQ HOSP IP/OBS MODERATE 35: CPT

## 2024-03-05 RX ORDER — ALBUTEROL 90 UG/1
2 AEROSOL, METERED ORAL EVERY 6 HOURS
Refills: 0 | Status: DISCONTINUED | OUTPATIENT
Start: 2024-03-05 | End: 2024-04-04

## 2024-03-05 NOTE — BH CHART NOTE - NSEVENTNOTEFT_PSY_ALL_CORE
Patient assessed with attending psychiatrist on call. Patient sitting in bed, non-verbal, tearful. Per nursing staff, patient was wheezing and walking in the hallways. Patient not wheezing upon arrival. Patient offered ativan PO and denied; offered ativan IM and denied. Patient observed walking up and down hallway comfortably.  Patient assessed with attending psychiatrist on call. Patient sitting in bed, non-verbal, tearful. Per nursing staff, patient was wheezing and walking in the hallways. Patient not wheezing upon arrival. Patient offered ativan PO and denied; offered ativan IM and denied. Patient observed walking up and down hallway comfortably.  - albuterol inh available to pt d/t hx of asthma

## 2024-03-05 NOTE — BH INPATIENT PSYCHIATRY PROGRESS NOTE - NSBHCHARTREVIEWVS_PSY_A_CORE FT
Vital Signs Last 24 Hrs  T(C): 36.6 (03-05-24 @ 09:42), Max: 36.6 (03-04-24 @ 17:41)  T(F): 97.9 (03-05-24 @ 09:42), Max: 97.9 (03-04-24 @ 17:41)  HR: 91 (03-05-24 @ 09:42) (75 - 91)  BP: 114/76 (03-05-24 @ 09:42) (112/78 - 114/76)  BP(mean): --  RR: 18 (03-05-24 @ 09:42) (18 - 18)  SpO2: 98% (03-05-24 @ 09:42) (98% - 100%)     Vital Signs Last 24 Hrs  T(C): 36.6 (03-05-24 @ 09:42), Max: 36.6 (03-04-24 @ 20:56)  T(F): 97.9 (03-05-24 @ 09:42), Max: 97.9 (03-04-24 @ 20:56)  HR: 91 (03-05-24 @ 09:42) (82 - 91)  BP: 114/76 (03-05-24 @ 09:42) (113/79 - 114/76)  BP(mean): --  RR: 18 (03-05-24 @ 09:42) (18 - 18)  SpO2: 98% (03-05-24 @ 09:42) (98% - 100%)

## 2024-03-05 NOTE — BH INPATIENT PSYCHIATRY PROGRESS NOTE - NSBHFUPINTERVALHXFT_PSY_A_CORE
The patient was not adherent to lorazepam per EMR record over the past day.  Per therapeutic activities leaders reports, the patient attends but is limitedly engaged and does not say anything unless somebody tries initiating conversation with the patient.      The patient is seen in her room this morning laying in bed, calm.  She is less talkative than the day prior and requires frequent pressing with questions and re-engagement.  Says she is "Ms. Kumar is still preservative on not wanting to take the medication and demonstrates no insight into how her activity level improves after receiving lorazepam.  She continues to report dizziness.  Denies SI, HI, and AVH.     Later in the afternoon, the patient appears more sullen with a rigid facial expression.  Upon attempts to engage, the patient does not speak or make any eye contact with this writer.  Rather, she paces the hallways, staring forward without acknowledgement of this writer's presence. The patient was not adherent to lorazepam per EMR record over the past day.  Per therapeutic activities leaders reports, the patient attends but is limitedly engaged and does not say anything unless somebody tries initiating conversation with the patient.      The patient is seen in her room this morning laying in bed, calm.  She is less talkative than the day prior and requires frequent pressing with questions and re-engagement.  Says she is "okay."  Ms. Kumar is still preservative on not wanting to take the medication and demonstrates no insight into how her activity level improves after receiving lorazepam.  She continues to report dizziness.  Denies SI, HI, and AVH.     Later in the afternoon, the patient appears more sullen with a rigid facial expression.  Upon attempts to engage, the patient does not speak or make any eye contact with this writer.  Rather, she paces the hallways, staring forward without acknowledgement of this writer's presence.

## 2024-03-05 NOTE — BH CHART NOTE - NSEVENTNOTEFT_PSY_ALL_CORE
I attempted to engage Ms. Kumar at 11:45 am. She appeared somewhat dysregulated, though emotionally blunted and was pacing the halls. She was not verbally responsive to my attempts to talk or walk with her.

## 2024-03-05 NOTE — BH INPATIENT PSYCHIATRY PROGRESS NOTE - CURRENT MEDICATION
MEDICATIONS  (STANDING):  LORazepam     Tablet 2 milliGRAM(s) Oral two times a day    MEDICATIONS  (PRN):  aluminum hydroxide/magnesium hydroxide/simethicone Suspension 30 milliLiter(s) Oral every 6 hours PRN Dyspepsia  LORazepam    Concentrate 1 milliGRAM(s) Oral every 6 hours PRN agitation, anxiety  meclizine 12.5 milliGRAM(s) Oral every 6 hours PRN dizziness  ondansetron   Disintegrating Tablet 4 milliGRAM(s) Oral four times a day PRN nausea

## 2024-03-05 NOTE — BH INPATIENT PSYCHIATRY PROGRESS NOTE - MSE UNSTRUCTURED FT
Appearance: appears stated age, expressionless face  Behavior: slow to move, increasingly uncooperative, pacing the hallway, no eye contact  Psychomotor: +PMR  Speech: mild speech latency, reduce rate, reduced volume, monotone in the morning, then mute in the afternoon  Mood: "good"  Affect: mood incongruent, dysphoric though improved from prior, stable, constricted, monotone  Thought process: linear and logical  Thought content: somatic feelings of being unwell, denies SI/SA/HI  Perception: denies current AVH, but reports past AH  Memory: impaired, not formally tested  Concentration: impaired, not formally tested  Insight: poor  Judgement: poor  Impulse Control: appropriate to setting Appearance: appears stated age, expressionless face  Behavior: slow to move, increasingly uncooperative, pacing the hallway, no eye contact  Psychomotor: +PMR  Speech: mild speech latency, reduce rate, reduced volume, monotone in the morning, then mute in the afternoon  Mood: "okay"  Affect: mood incongruent, dysphoric, stable, constricted, monotone  Thought process: unable to adequately assess due to limited engagement  Thought content: denies SI/SA/HI  Perception: denies current AVH  Memory: impaired, not formally tested  Concentration: impaired, not formally tested  Insight: poor  Judgement: poor  Impulse Control: appropriate to setting

## 2024-03-05 NOTE — BH INPATIENT PSYCHIATRY PROGRESS NOTE - NSBHASSESSSUMMFT_PSY_ALL_CORE
Ms. 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 before transfer to [facility] for tearfulness, decreased activity, and decreased appetite.    The patient's presentation is most consistent with recurrent MDD with catatonic features (severe) in the context of medication non-adherence.  The patient reports depressed mood, decrease in appetite, change in sleep (increased), psychomotor retardation, somatic symptoms, decreased interest in activities and concentration.  Moreover, patient has catatonic features of staring/poor eye contact, poverty of speech, decrease mobility, and withdrawal.  Given history or MDD w/ catatonic features, suspect the patient has decompensated and would respond well to Ativan as she did during her last [facility] admission.  Maternal engagement resulted in temporary better medication adherence so will involve the mother closely in case.  Although depressed mood, patient is low SA risk given no prior history and no current thoughts of SI.  However, will continue to assess for degree of depression and SI once the patient's catatonia is addressed and improved.  The patient necessitates inpatient psychiatric admission and continued care due to danger to self as she is unable to care for basic needs in her current state.    3/4 - Patient more visible on the unit and engaging during conversation.  Suspect the Ativan regimen is helping the patient.  He dizziness does not logically correlate to the medication, which suggests that may be more of a symptoms secondary to her MDD with catatonic/psychotic features.    Plan:  - Continue Ativan 2mg po BID for catatonia  - Continue to monitor depression symptoms   - Encourage the patient to engage in therapeutic activities on the unit Ms. 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    The patient's presentation is most consistent with recurrent MDD with catatonic features (severe) in the context of medication non-adherence.  The patient reports depressed mood, decrease in appetite, change in sleep (increased), psychomotor retardation, somatic symptoms, decreased interest in activities and concentration.  Moreover, patient has catatonic features of staring/poor eye contact, poverty of speech, decrease mobility, and withdrawal.  Given history or MDD w/ catatonic features, suspect the patient has decompensated and would respond well to Ativan as she did during her last [facility] admission.  Maternal engagement resulted in temporary better medication adherence so will involve the mother closely in case.  Although depressed mood, patient is low SA risk given no prior history and no current thoughts of SI.  However, will continue to assess for degree of depression and SI once the patient's catatonia is addressed and improved.  The patient necessitates inpatient psychiatric admission and continued care due to danger to self as she is unable to care for basic needs in her current state.    3/5 - The patient has noticeably declined and become more catatonic now that she has declined recent doses of Ativan.  This finding strengthens the clinical decision to use Ativan to treat the patient's catatonia.  Will encourage the patient's mother to have and in-person meeting to promote medication adherence as this worked during prior admission.    Plan:  - Continue Ativan 2mg po BID for catatonia  - Plan for family meeting tomorrow with the patient's mother to encourage medication adherence  - Continue to monitor depression symptoms; consider adding SSRI once able to better assess after treatment of catatonia with benzodiazepines  - Encourage the patient to engage in therapeutic activities on the unit Ms. 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    The patient's presentation is most consistent with recurrent MDD with catatonic features (severe) in the context of medication non-adherence.  The patient reports depressed mood, decrease in appetite, change in sleep (increased), psychomotor retardation, somatic symptoms, decreased interest in activities and concentration.  Moreover, patient has catatonic features of staring/poor eye contact, poverty of speech, decrease mobility, and withdrawal.  Given history or MDD w/ catatonic features, suspect the patient has decompensated and would respond well to Ativan as she did during her last [facility] admission.  Maternal engagement resulted in temporary better medication adherence so will involve the mother closely in case.  Although depressed mood, patient is low SA risk given no prior history and no current thoughts of SI.  However, will continue to assess for degree of depression and SI once the patient's catatonia is addressed and improved.  The patient necessitates inpatient psychiatric admission and continued care due to danger to self as she is unable to care for basic needs in her current state.    3/5 - The patient has noticeably declined and become more catatonic now that she has declined recent doses of Ativan.  This finding strengthens the clinical decision to use Ativan to treat the patient's catatonia.  Will encourage the patient's mother to have and in-person meeting to promote medication adherence as this worked during prior admission.    Plan:  - Continue Ativan 2mg po BID for catatonia  - albuterol inh available d/t hx of asthma   - Plan for family meeting tomorrow with the patient's mother to encourage medication adherence  - Continue to monitor depression symptoms; consider adding SSRI once able to better assess after treatment of catatonia with benzodiazepines  - Encourage the patient to engage in therapeutic activities on the unit

## 2024-03-05 NOTE — BH INPATIENT PSYCHIATRY PROGRESS NOTE - NSBHMETABOLIC_PSY_ALL_CORE_FT
BMI: BMI (kg/m2): 27.3 (03-04-24 @ 21:11)  HbA1c:   Glucose:   BP: 114/76 (03-05-24 @ 09:42) (106/74 - 124/80)Vital Signs Last 24 Hrs  T(C): 36.6 (03-05-24 @ 09:42), Max: 36.6 (03-04-24 @ 17:41)  T(F): 97.9 (03-05-24 @ 09:42), Max: 97.9 (03-04-24 @ 17:41)  HR: 91 (03-05-24 @ 09:42) (75 - 91)  BP: 114/76 (03-05-24 @ 09:42) (112/78 - 114/76)  BP(mean): --  RR: 18 (03-05-24 @ 09:42) (18 - 18)  SpO2: 98% (03-05-24 @ 09:42) (98% - 100%)      Lipid Panel:  BMI: BMI (kg/m2): 27.3 (03-04-24 @ 21:11)  HbA1c:   Glucose:   BP: 114/76 (03-05-24 @ 09:42) (106/74 - 116/77)Vital Signs Last 24 Hrs  T(C): 36.6 (03-05-24 @ 09:42), Max: 36.6 (03-04-24 @ 17:41)  T(F): 97.9 (03-05-24 @ 09:42), Max: 97.9 (03-04-24 @ 17:41)  HR: 91 (03-05-24 @ 09:42) (75 - 91)  BP: 114/76 (03-05-24 @ 09:42) (112/78 - 114/76)  BP(mean): --  RR: 18 (03-05-24 @ 09:42) (18 - 18)  SpO2: 98% (03-05-24 @ 09:42) (98% - 100%)      Lipid Panel:  BMI: BMI (kg/m2): 27.3 (03-04-24 @ 21:11)  HbA1c:   Glucose:   BP: 114/76 (03-05-24 @ 09:42) (106/74 - 116/77)Vital Signs Last 24 Hrs  T(C): 36.6 (03-05-24 @ 09:42), Max: 36.6 (03-04-24 @ 20:56)  T(F): 97.9 (03-05-24 @ 09:42), Max: 97.9 (03-04-24 @ 20:56)  HR: 91 (03-05-24 @ 09:42) (82 - 91)  BP: 114/76 (03-05-24 @ 09:42) (113/79 - 114/76)  BP(mean): --  RR: 18 (03-05-24 @ 09:42) (18 - 18)  SpO2: 98% (03-05-24 @ 09:42) (98% - 100%)      Lipid Panel:

## 2024-03-06 NOTE — BH INPATIENT PSYCHIATRY PROGRESS NOTE - MSE UNSTRUCTURED FT
Appearance: appears stated age, expressionless face  Behavior: slow to move, uncooperative, pacing the hallway, fleeting eye contact  Psychomotor: +PMR  Speech: mute  Mood: unable to assess  Affect: unable to assess  Thought process: unable to assess  Thought content: unable to assess  Perception: unable to assess  Memory: unable to assess  Concentration: unable to assess  Insight: unable to assess  Judgement: unable to assess  Impulse Control: unable to assess

## 2024-03-06 NOTE — BH INPATIENT PSYCHIATRY PROGRESS NOTE - CURRENT MEDICATION
MEDICATIONS  (STANDING):  LORazepam     Tablet 2 milliGRAM(s) Oral two times a day    MEDICATIONS  (PRN):  albuterol    90 MICROgram(s) HFA Inhaler 2 Puff(s) Inhalation every 6 hours PRN Shortness of Breath and/or Wheezing  aluminum hydroxide/magnesium hydroxide/simethicone Suspension 30 milliLiter(s) Oral every 6 hours PRN Dyspepsia  LORazepam    Concentrate 1 milliGRAM(s) Oral every 6 hours PRN agitation, anxiety  meclizine 12.5 milliGRAM(s) Oral every 6 hours PRN dizziness  ondansetron   Disintegrating Tablet 4 milliGRAM(s) Oral four times a day PRN nausea

## 2024-03-06 NOTE — BH INPATIENT PSYCHIATRY PROGRESS NOTE - PRN MEDS
MEDICATIONS  (PRN):  albuterol    90 MICROgram(s) HFA Inhaler 2 Puff(s) Inhalation every 6 hours PRN Shortness of Breath and/or Wheezing  aluminum hydroxide/magnesium hydroxide/simethicone Suspension 30 milliLiter(s) Oral every 6 hours PRN Dyspepsia  LORazepam    Concentrate 1 milliGRAM(s) Oral every 6 hours PRN agitation, anxiety  meclizine 12.5 milliGRAM(s) Oral every 6 hours PRN dizziness  ondansetron   Disintegrating Tablet 4 milliGRAM(s) Oral four times a day PRN nausea

## 2024-03-06 NOTE — BH INPATIENT PSYCHIATRY PROGRESS NOTE - NSBHMETABOLIC_PSY_ALL_CORE_FT
BMI: BMI (kg/m2): 27.3 (03-04-24 @ 21:11)  HbA1c:   Glucose:   BP: 117/69 (03-06-24 @ 09:49) (106/74 - 124/92)Vital Signs Last 24 Hrs  T(C): 36.7 (03-06-24 @ 09:49), Max: 36.7 (03-06-24 @ 09:49)  T(F): 98 (03-06-24 @ 09:49), Max: 98 (03-06-24 @ 09:49)  HR: 93 (03-06-24 @ 09:49) (93 - 99)  BP: 117/69 (03-06-24 @ 09:49) (117/69 - 124/92)  BP(mean): --  RR: 16 (03-06-24 @ 09:49) (16 - 18)  SpO2: 99% (03-06-24 @ 09:49) (99% - 100%)      Lipid Panel:

## 2024-03-06 NOTE — BH INPATIENT PSYCHIATRY PROGRESS NOTE - NSBHFUPINTERVALHXFT_PSY_A_CORE
The patient is seen pacing back and forth down the main hallway with patient rooms branching off.  Upon approach and multiple attempts to engage, she makes fleeting eye contact and does not verbally respond or change any facial expression in attempts to interact with this writer.

## 2024-03-06 NOTE — BH INPATIENT PSYCHIATRY PROGRESS NOTE - NSBHASSESSSUMMFT_PSY_ALL_CORE
Ms. 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    The patient's presentation is most consistent with recurrent MDD with catatonic features (severe) in the context of medication non-adherence.  The patient reports depressed mood, decrease in appetite, change in sleep (increased), psychomotor retardation, somatic symptoms, decreased interest in activities and concentration.  Moreover, patient has catatonic features of staring/poor eye contact, poverty of speech, decrease mobility, and withdrawal.  Given history or MDD w/ catatonic features, suspect the patient has decompensated and would respond well to Ativan as she did during her last [facility] admission.  Maternal engagement resulted in temporary better medication adherence so will involve the mother closely in case.  Although depressed mood, patient is low SA risk given no prior history and no current thoughts of SI.  However, will continue to assess for degree of depression and SI once the patient's catatonia is addressed and improved.  The patient necessitates inpatient psychiatric admission and continued care due to danger to self as she is unable to care for basic needs in her current state.    3/6 - The patient has declined as she is now mute, expressionless, and pacing.  She has become more catatonic now that she has declined doses of Ativan.  This finding strengthens the clinical decision to use Ativan to treat the patient's catatonia.    Plan:  - Continue Ativan 2mg po BID for catatonia  - Albuterol inh available d/t hx of asthma   - Plan for family meeting when patient's mother is able to encourage medication adherence  - Continue to monitor depression symptoms; consider adding SSRI once able to better assess after treatment of catatonia with benzodiazepines  - Encourage the patient to engage in therapeutic activities on the unit

## 2024-03-06 NOTE — BH INPATIENT PSYCHIATRY PROGRESS NOTE - NSBHCHARTREVIEWVS_PSY_A_CORE FT
Vital Signs Last 24 Hrs  T(C): 36.7 (03-06-24 @ 09:49), Max: 36.7 (03-06-24 @ 09:49)  T(F): 98 (03-06-24 @ 09:49), Max: 98 (03-06-24 @ 09:49)  HR: 93 (03-06-24 @ 09:49) (93 - 99)  BP: 117/69 (03-06-24 @ 09:49) (117/69 - 124/92)  BP(mean): --  RR: 16 (03-06-24 @ 09:49) (16 - 18)  SpO2: 99% (03-06-24 @ 09:49) (99% - 100%)

## 2024-03-07 PROCEDURE — 99232 SBSQ HOSP IP/OBS MODERATE 35: CPT

## 2024-03-07 RX ADMIN — Medication 2 MILLIGRAM(S): at 13:15

## 2024-03-07 RX ADMIN — Medication 2 MILLIGRAM(S): at 16:14

## 2024-03-07 NOTE — CHART NOTE - NSCHARTNOTEFT_GEN_A_CORE
on call note  Pt assessed this evening as she has been catatonic receiving lorazepam.  Chart and VS reviewed.  Per team, pt received Ativan 2mg PO at noon with no response (not seen in MAR); Ativan 2mg IM at 16:14.  d/w staff, pt had good response to the IM ativan, became more verbal asking to do laundry, no sedation.  Pt assessed on 8U this evening, pt internally preoccupied, ambulating normally, minimally engaged with writer and did not answer any questions or indicate any distress, slowly walked away declining exam.  No acute interventions at this time; recommend continued IM lorazepam 2mg TID tomorrow.    Vital Signs Last 24 Hrs  T(C): 36.9 (06 Mar 2024 20:19), Max: 36.9 (06 Mar 2024 20:19)  T(F): 98.5 (06 Mar 2024 20:19), Max: 98.5 (06 Mar 2024 20:19)  HR: 95 (07 Mar 2024 17:36) (88 - 95)  BP: 107/75 (07 Mar 2024 17:36) (107/75 - 134/102)  BP(mean): --  RR: 18 (06 Mar 2024 20:19) (18 - 18)  SpO2: 100% (07 Mar 2024 17:36) (99% - 100%)    Parameters below as of 07 Mar 2024 17:36  Patient On (Oxygen Delivery Method): room air

## 2024-03-07 NOTE — BH INPATIENT PSYCHIATRY PROGRESS NOTE - NSBHCHARTREVIEWVS_PSY_A_CORE FT
Vital Signs Last 24 Hrs  T(C): 36.9 (03-06-24 @ 20:19), Max: 36.9 (03-06-24 @ 20:19)  T(F): 98.5 (03-06-24 @ 20:19), Max: 98.5 (03-06-24 @ 20:19)  HR: 91 (03-07-24 @ 08:33) (88 - 92)  BP: 134/102 (03-07-24 @ 08:33) (110/74 - 134/102)  BP(mean): --  RR: 18 (03-06-24 @ 20:19) (18 - 18)  SpO2: 99% (03-07-24 @ 08:33) (99% - 100%)

## 2024-03-07 NOTE — BH INPATIENT PSYCHIATRY PROGRESS NOTE - NSBHMETABOLIC_PSY_ALL_CORE_FT
BMI: BMI (kg/m2): 27.3 (03-04-24 @ 21:11)  HbA1c:   Glucose:   BP: 134/102 (03-07-24 @ 08:33) (110/74 - 134/102)Vital Signs Last 24 Hrs  T(C): 36.9 (03-06-24 @ 20:19), Max: 36.9 (03-06-24 @ 20:19)  T(F): 98.5 (03-06-24 @ 20:19), Max: 98.5 (03-06-24 @ 20:19)  HR: 91 (03-07-24 @ 08:33) (88 - 92)  BP: 134/102 (03-07-24 @ 08:33) (110/74 - 134/102)  BP(mean): --  RR: 18 (03-06-24 @ 20:19) (18 - 18)  SpO2: 99% (03-07-24 @ 08:33) (99% - 100%)      Lipid Panel:

## 2024-03-07 NOTE — BH INPATIENT PSYCHIATRY PROGRESS NOTE - NSBHFUPINTERVALHXFT_PSY_A_CORE
Pacing the hallway most of the morning; refuses to speak ; seen with RN during lunch ; won't eat; food on table ; neat but untouched; discussed oral Ativan refused; appears psychotic and catatonic; IM Ativan 2mg to be administered for catatonia;  As patient is consistently not compliant with medication needed to treat the patient's condition and the patient's symptoms remain severe and interfere with functioning, application is planned for treatment over objection

## 2024-03-07 NOTE — BH INPATIENT PSYCHIATRY PROGRESS NOTE - NSBHASSESSSUMMFT_PSY_ALL_CORE
Ms. 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    The patient's presentation is most consistent with recurrent MDD with catatonic features (severe) in the context of medication non-adherence.  The patient reports depressed mood, decrease in appetite, change in sleep (increased), psychomotor retardation, somatic symptoms, decreased interest in activities and concentration.  Moreover, patient has catatonic features of staring/poor eye contact, poverty of speech, decrease mobility, and withdrawal.  Given history or MDD w/ catatonic features, suspect the patient has decompensated and would respond well to Ativan as she did during her last [facility] admission.  Maternal engagement resulted in temporary better medication adherence so will involve the mother closely in case.  Although depressed mood, patient is low SA risk given no prior history and no current thoughts of SI.  However, will continue to assess for degree of depression and SI once the patient's catatonia is addressed and improved.  The patient necessitates inpatient psychiatric admission and continued care due to danger to self as she is unable to care for basic needs in her current state.    3/6 - The patient has declined as she is now mute, expressionless, and pacing.  She has become more catatonic now that she has declined doses of Ativan.  This finding strengthens the clinical decision to use Ativan to treat the patient's catatonia.    Plan:  - Continue Ativan 2mg po BID for catatonia  - Albuterol inh available d/t hx of asthma   - Plan for family meeting when patient's mother is able to encourage medication adherence  - Continue to monitor depression symptoms; consider adding SSRI once able to better assess after treatment of catatonia with benzodiazepines  - Encourage the patient to engage in therapeutic activities on the unit    ;;03/07: appears to be worsening over last few days ; Pacing the hallway most of the morning; refuses to speak ; seen with RN during lunch ; won't eat; food on table ; neat but untouched; discussed oral Ativan refused; appears psychotic and catatonic; IM Ativan 2mg to be administered for catatonia;  As patient is consistently not compliant with medication needed to treat the patient's condition and the patient's symptoms remain severe and interfere with functioning, application is planned for treatment over objection

## 2024-03-07 NOTE — BH INPATIENT PSYCHIATRY PROGRESS NOTE - CURRENT MEDICATION
MEDICATIONS  (STANDING):  LORazepam     Tablet 2 milliGRAM(s) Oral two times a day    MEDICATIONS  (PRN):  albuterol    90 MICROgram(s) HFA Inhaler 2 Puff(s) Inhalation every 6 hours PRN Shortness of Breath and/or Wheezing  aluminum hydroxide/magnesium hydroxide/simethicone Suspension 30 milliLiter(s) Oral every 6 hours PRN Dyspepsia  LORazepam    Concentrate 1 milliGRAM(s) Oral every 6 hours PRN agitation, anxiety  meclizine 12.5 milliGRAM(s) Oral every 6 hours PRN dizziness  ondansetron   Disintegrating Tablet 4 milliGRAM(s) Oral four times a day PRN nausea   MEDICATIONS  (STANDING):  LORazepam     Tablet 2 milliGRAM(s) Oral two times a day  LORazepam   Injectable 2 milliGRAM(s) IntraMuscular once    MEDICATIONS  (PRN):  albuterol    90 MICROgram(s) HFA Inhaler 2 Puff(s) Inhalation every 6 hours PRN Shortness of Breath and/or Wheezing  aluminum hydroxide/magnesium hydroxide/simethicone Suspension 30 milliLiter(s) Oral every 6 hours PRN Dyspepsia  LORazepam    Concentrate 1 milliGRAM(s) Oral every 6 hours PRN agitation, anxiety  meclizine 12.5 milliGRAM(s) Oral every 6 hours PRN dizziness  ondansetron   Disintegrating Tablet 4 milliGRAM(s) Oral four times a day PRN nausea

## 2024-03-08 PROCEDURE — 99231 SBSQ HOSP IP/OBS SF/LOW 25: CPT

## 2024-03-08 NOTE — BH INPATIENT PSYCHIATRY PROGRESS NOTE - NSBHASSESSSUMMFT_PSY_ALL_CORE
Ms. 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    The patient's presentation is most consistent with recurrent MDD with catatonic features (severe) in the context of medication non-adherence.  The patient reports depressed mood, decrease in appetite, change in sleep (increased), psychomotor retardation, somatic symptoms, decreased interest in activities and concentration.  Moreover, patient has catatonic features of staring/poor eye contact, poverty of speech, decrease mobility, and withdrawal.  Given history or MDD w/ catatonic features, suspect the patient has decompensated and would respond well to Ativan as she did during her last [facility] admission.  Maternal engagement resulted in temporary better medication adherence so will involve the mother closely in case.  Although depressed mood, patient is low SA risk given no prior history and no current thoughts of SI.  However, will continue to assess for degree of depression and SI once the patient's catatonia is addressed and improved.  The patient necessitates inpatient psychiatric admission and continued care due to danger to self as she is unable to care for basic needs in her current state.    3/8 - The patient continues to be catatonia without medication.  This finding strengthens the clinical decision to use Ativan to treat the patient's catatonia.  Consider treatment over objection as the patient has stopped eating and this will be a danger to self emergency if the patient continues without medications.    Plan:  - Continue Ativan 2mg po BID for catatonia; IM Ativan as needed when patient is refusing medication and posing danger to self  - Albuterol inh available d/t hx of asthma   - Plan for family meeting when patient's mother is able to encourage medication adherence  - Continue to monitor depression symptoms; consider adding SSRI once able to better assess after treatment of catatonia with benzodiazepines  - Encourage the patient to engage in therapeutic activities on the unit

## 2024-03-08 NOTE — BH INPATIENT PSYCHIATRY PROGRESS NOTE - PRN MEDS
MEDICATIONS  (PRN):  albuterol    90 MICROgram(s) HFA Inhaler 2 Puff(s) Inhalation every 6 hours PRN Shortness of Breath and/or Wheezing  aluminum hydroxide/magnesium hydroxide/simethicone Suspension 30 milliLiter(s) Oral every 6 hours PRN Dyspepsia  LORazepam    Concentrate 1 milliGRAM(s) Oral every 6 hours PRN agitation, anxiety  meclizine 12.5 milliGRAM(s) Oral every 6 hours PRN dizziness  ondansetron   Disintegrating Tablet 4 milliGRAM(s) Oral four times a day PRN nausea   MEDICATIONS  (PRN):

## 2024-03-08 NOTE — BH INPATIENT PSYCHIATRY PROGRESS NOTE - NSBHFUPINTERVALHXFT_PSY_A_CORE
Per staff report, the patient has not been eating and yesterday IM Ativan was given with good effect.  Patient is seen in her room laying in the bed around noon with sheets up to her chin.  She makes intermittent eye contact with the interviewer and replies in only a few words maximum without asking any questions or making attempts to continue the conversation.   She reports feeling "fine" eating and sleeping well.  She does not answer when asked if she has reached out to her mother.  She denies SI, HI, and AVH.  She does not answer when asked about medications.

## 2024-03-08 NOTE — BH INPATIENT PSYCHIATRY PROGRESS NOTE - CURRENT MEDICATION
MEDICATIONS  (STANDING):  LORazepam     Tablet 2 milliGRAM(s) Oral two times a day    MEDICATIONS  (PRN):  albuterol    90 MICROgram(s) HFA Inhaler 2 Puff(s) Inhalation every 6 hours PRN Shortness of Breath and/or Wheezing  aluminum hydroxide/magnesium hydroxide/simethicone Suspension 30 milliLiter(s) Oral every 6 hours PRN Dyspepsia  LORazepam    Concentrate 1 milliGRAM(s) Oral every 6 hours PRN agitation, anxiety  meclizine 12.5 milliGRAM(s) Oral every 6 hours PRN dizziness  ondansetron   Disintegrating Tablet 4 milliGRAM(s) Oral four times a day PRN nausea   MEDICATIONS  (STANDING):    MEDICATIONS  (PRN):

## 2024-03-08 NOTE — BH INPATIENT PSYCHIATRY PROGRESS NOTE - NSBHCHARTREVIEWVS_PSY_A_CORE FT
Vital Signs Last 24 Hrs  T(C): --  T(F): --  HR: 109 (03-08-24 @ 09:00) (95 - 114)  BP: 115/72 (03-08-24 @ 09:00) (106/77 - 115/72)  BP(mean): --  RR: --  SpO2: 96% (03-08-24 @ 09:00) (96% - 100%)     Vital Signs Last 24 Hrs  T(C): --  T(F): --  HR: --  BP: --  BP(mean): --  RR: --  SpO2: --

## 2024-03-08 NOTE — BH INPATIENT PSYCHIATRY PROGRESS NOTE - MSE UNSTRUCTURED FT
Appearance: appears stated age, expressionless face  Behavior: slow to move, uncooperative, laying in bed  Psychomotor: +PMR  Speech: poverty of speech, soft volume, monotone  Mood: "fine"  Affect: dysphoric, mood incongruent, stable  Thought process: unable to assess  Thought content: unable to assess  Perception: denies AVH  Memory: unable to assess  Concentration: unable to assess  Insight: poor  Judgement: poor  Impulse Control: appropriate to setting

## 2024-03-08 NOTE — BH INPATIENT PSYCHIATRY PROGRESS NOTE - NSBHMETABOLIC_PSY_ALL_CORE_FT
BMI: BMI (kg/m2): 27.3 (03-04-24 @ 21:11)  HbA1c:   Glucose:   BP: 115/72 (03-08-24 @ 09:00) (106/77 - 134/102)Vital Signs Last 24 Hrs  T(C): --  T(F): --  HR: 109 (03-08-24 @ 09:00) (95 - 114)  BP: 115/72 (03-08-24 @ 09:00) (106/77 - 115/72)  BP(mean): --  RR: --  SpO2: 96% (03-08-24 @ 09:00) (96% - 100%)      Lipid Panel:  BMI: BMI (kg/m2): 27.3 (03-04-24 @ 21:11)  HbA1c:   Glucose:   BP: 115/74 (04-11-24 @ 08:08) (109/74 - 120/74)Vital Signs Last 24 Hrs  T(C): --  T(F): --  HR: --  BP: --  BP(mean): --  RR: --  SpO2: --      Lipid Panel:

## 2024-03-09 RX ADMIN — Medication 2 MILLIGRAM(S): at 01:37

## 2024-03-11 PROCEDURE — 99231 SBSQ HOSP IP/OBS SF/LOW 25: CPT

## 2024-03-11 NOTE — BH INPATIENT PSYCHIATRY PROGRESS NOTE - MSE UNSTRUCTURED FT
Appearance: appears stated age, expressionless face, tear running down face  Behavior: slow to move, uncooperative, sitting in bed  Psychomotor: +PMR  Speech: unable to assess  Mood: unable to assess  Affect: dysphoric, mood incongruent, stable  Thought process: unable to assess  Thought content: unable to assess  Perception: unable to assess  Memory: unable to assess  Concentration: unable to assess  Insight: unable to assess  Judgement: unable to assess  Impulse Control: appropriate to setting

## 2024-03-11 NOTE — BH INPATIENT PSYCHIATRY PROGRESS NOTE - NSBHMETABOLIC_PSY_ALL_CORE_FT
BMI: BMI (kg/m2): 27.3 (03-04-24 @ 21:11)  HbA1c:   Glucose:   BP: 113/83 (03-10-24 @ 20:51) (92/68 - 118/82)Vital Signs Last 24 Hrs  T(C): --  T(F): --  HR: 98 (03-10-24 @ 20:51) (96 - 98)  BP: 113/83 (03-10-24 @ 20:51) (92/68 - 113/83)  BP(mean): --  RR: 20 (03-10-24 @ 20:51) (16 - 20)  SpO2: 99% (03-10-24 @ 20:51) (99% - 100%)    Orthostatic VS  03-09-24 @ 11:14  Lying BP: --/-- HR: --  Sitting BP: --/-- HR: --  Standing BP: 111/81 HR: 130  Site: upper right arm  Mode: electronic    Lipid Panel:  BMI: BMI (kg/m2): 27.3 (03-04-24 @ 21:11)  HbA1c:   Glucose:   BP: 115/74 (04-11-24 @ 08:08) (109/74 - 120/74)Vital Signs Last 24 Hrs  T(C): --  T(F): --  HR: --  BP: --  BP(mean): --  RR: --  SpO2: --      Lipid Panel:

## 2024-03-11 NOTE — BH INPATIENT PSYCHIATRY PROGRESS NOTE - NSBHCHARTREVIEWVS_PSY_A_CORE FT
Vital Signs Last 24 Hrs  T(C): --  T(F): --  HR: 98 (03-10-24 @ 20:51) (96 - 98)  BP: 113/83 (03-10-24 @ 20:51) (92/68 - 113/83)  BP(mean): --  RR: 20 (03-10-24 @ 20:51) (16 - 20)  SpO2: 99% (03-10-24 @ 20:51) (99% - 100%)    Orthostatic VS  03-09-24 @ 11:14  Lying BP: --/-- HR: --  Sitting BP: --/-- HR: --  Standing BP: 111/81 HR: 130  Site: upper right arm  Mode: electronic   Vital Signs Last 24 Hrs  T(C): --  T(F): --  HR: --  BP: --  BP(mean): --  RR: --  SpO2: --

## 2024-03-11 NOTE — BH INPATIENT PSYCHIATRY PROGRESS NOTE - NSBHASSESSSUMMFT_PSY_ALL_CORE
Ms. 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    The patient's presentation is most consistent with recurrent MDD with catatonic features (severe) in the context of medication non-adherence.  The patient reports depressed mood, decrease in appetite, change in sleep (increased), psychomotor retardation, somatic symptoms, decreased interest in activities and concentration.  Moreover, patient has catatonic features of staring/poor eye contact, poverty of speech, decrease mobility, and withdrawal.  Given history or MDD w/ catatonic features, suspect the patient has decompensated and would respond well to Ativan as she did during her last [facility] admission.  Maternal engagement resulted in temporary better medication adherence so will involve the mother closely in case.  Although depressed mood, patient is low SA risk given no prior history and no current thoughts of SI.  However, will continue to assess for degree of depression and SI once the patient's catatonia is addressed and improved.  The patient necessitates inpatient psychiatric admission and continued care due to danger to self as she is unable to care for basic needs in her current state.    3/11 - The patient continues to be catatonia without medication.  This finding strengthens the clinical decision to use Ativan to treat the patient's catatonia.  At this point, patient consistently refusing medications of psychiatric necessity. Team will complete treatment over objection.     Plan:  - Continue Ativan 2mg po BID for catatonia; IM Ativan as needed when patient is refusing medication and posing danger to self  - Albuterol inh available d/t hx of asthma   - Plan for family meeting when patient's mother is able to encourage medication adherence  - Continue to monitor depression symptoms; consider adding SSRI once able to better assess after treatment of catatonia with benzodiazepines  - Encourage the patient to engage in therapeutic activities on the unit  - Treatment over objection

## 2024-03-11 NOTE — BH INPATIENT PSYCHIATRY PROGRESS NOTE - NSBHFUPINTERVALHXFT_PSY_A_CORE
Per staff report, patient was eating over the weekend. Today upon approach, patient was sitting up on the edge of her bed, with a dysphoric affect. She did not appear to be in any acute pain or distress. Her face was expressionless and she had a tear running down her cheek. She was playing with her hospital band that she appeared to have ripped off her wrist. Eye contact was poor and she did not communicate or answer any questions at this time.

## 2024-03-12 PROCEDURE — 99232 SBSQ HOSP IP/OBS MODERATE 35: CPT

## 2024-03-12 NOTE — BH INPATIENT PSYCHIATRY PROGRESS NOTE - NSBHMETABOLIC_PSY_ALL_CORE_FT
BMI: BMI (kg/m2): 27.3 (03-04-24 @ 21:11)  HbA1c:   Glucose:   BP: 113/76 (03-11-24 @ 16:55) (92/68 - 113/83)Vital Signs Last 24 Hrs  T(C): --  T(F): --  HR: 88 (03-11-24 @ 16:55) (88 - 88)  BP: 113/76 (03-11-24 @ 16:55) (113/76 - 113/76)  BP(mean): --  RR: --  SpO2: 97% (03-11-24 @ 16:55) (97% - 97%)      Lipid Panel:  BMI: BMI (kg/m2): 27.3 (03-04-24 @ 21:11)  HbA1c:   Glucose:   BP: 115/74 (04-11-24 @ 08:08) (109/74 - 120/74)Vital Signs Last 24 Hrs  T(C): --  T(F): --  HR: --  BP: --  BP(mean): --  RR: --  SpO2: --      Lipid Panel:

## 2024-03-12 NOTE — BH INPATIENT PSYCHIATRY PROGRESS NOTE - NSBHFUPINTERVALHXFT_PSY_A_CORE
Per staff report, patient did not eat and continues to refuse medications. Today, patient is slowly pacing the hopson with a dysphoric affect and unwavering facial expression.  Eye contact was poor and she did not communicate or answer any questions at this time.    Collateral:  Per Dr. Lund who spoke with the patient's mother, Ms. Kumar's mother is agreeable with treatment over objection.

## 2024-03-12 NOTE — BH INPATIENT PSYCHIATRY PROGRESS NOTE - NSBHASSESSSUMMFT_PSY_ALL_CORE
Ms. 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    The patient's presentation is most consistent with recurrent MDD with catatonic features (severe) in the context of medication non-adherence.  The patient reports depressed mood, decrease in appetite, change in sleep (increased), psychomotor retardation, somatic symptoms, decreased interest in activities and concentration.  Moreover, patient has catatonic features of staring/poor eye contact, poverty of speech, decrease mobility, and withdrawal.  Given history or MDD w/ catatonic features, suspect the patient has decompensated and would respond well to Ativan as she did during her last [facility] admission.  Maternal engagement resulted in temporary better medication adherence so will involve the mother closely in case.  Although depressed mood, patient is low SA risk given no prior history and no current thoughts of SI.  However, will continue to assess for degree of depression and SI once the patient's catatonia is addressed and improved.  The patient necessitates inpatient psychiatric admission and continued care due to danger to self as she is unable to care for basic needs in her current state.    3/12 - The patient continues to be catatonia without medication.  This finding strengthens the clinical decision to use Ativan to treat the patient's catatonia.  At this point, patient consistently refusing medications of psychiatric necessity.  Team spoke with patient's mother who is agreeable with treatment over objection.     Plan:  - Continue Ativan 2mg po BID for catatonia; IM Ativan as needed when patient is refusing medication and posing danger to self  - Albuterol inh available d/t hx of asthma   - Plan for family meeting when patient's mother is able to encourage medication adherence  - Continue to monitor depression symptoms; consider adding SSRI once able to better assess after treatment of catatonia with benzodiazepines  - Encourage the patient to engage in therapeutic activities on the unit  - Treatment over objection

## 2024-03-12 NOTE — BH INPATIENT PSYCHIATRY PROGRESS NOTE - NSBHCHARTREVIEWVS_PSY_A_CORE FT
Vital Signs Last 24 Hrs  T(C): --  T(F): --  HR: 88 (03-11-24 @ 16:55) (88 - 88)  BP: 113/76 (03-11-24 @ 16:55) (113/76 - 113/76)  BP(mean): --  RR: --  SpO2: 97% (03-11-24 @ 16:55) (97% - 97%)     Vital Signs Last 24 Hrs  T(C): --  T(F): --  HR: --  BP: --  BP(mean): --  RR: --  SpO2: --

## 2024-03-12 NOTE — BH INPATIENT PSYCHIATRY PROGRESS NOTE - MSE UNSTRUCTURED FT
Appearance: appears stated age, expressionless face, mildly disheveled  Behavior: slow to move, uncooperative, pacing hallways  Psychomotor: +PMR  Speech: unable to assess  Mood: unable to assess  Affect: dysphoric, stable  Thought process: unable to assess  Thought content: unable to assess  Perception: unable to assess  Memory: unable to assess  Concentration: unable to assess  Insight: unable to assess  Judgement: unable to assess  Impulse Control: appropriate to setting

## 2024-03-13 PROCEDURE — 99231 SBSQ HOSP IP/OBS SF/LOW 25: CPT

## 2024-03-13 NOTE — BH INPATIENT PSYCHIATRY PROGRESS NOTE - NSBHMETABOLIC_PSY_ALL_CORE_FT
BMI: BMI (kg/m2): 27.3 (03-04-24 @ 21:11)  HbA1c:   Glucose:   BP: 117/80 (03-12-24 @ 09:00) (92/68 - 117/80)Vital Signs Last 24 Hrs  T(C): --  T(F): --  HR: --  BP: --  BP(mean): --  RR: --  SpO2: --      Lipid Panel:  BMI: BMI (kg/m2): 27.3 (03-04-24 @ 21:11)  HbA1c:   Glucose:   BP: 115/74 (04-11-24 @ 08:08) (109/74 - 120/74)Vital Signs Last 24 Hrs  T(C): --  T(F): --  HR: --  BP: --  BP(mean): --  RR: --  SpO2: --      Lipid Panel:

## 2024-03-13 NOTE — BH INPATIENT PSYCHIATRY PROGRESS NOTE - NSBHFUPINTERVALHXFT_PSY_A_CORE
Per staff report, patient eats minimally and continues to refuse medications. Patient attends groups but does not engage with the activities.  Today, patient is slowly pacing the hopson with a dysphoric affect and unwavering facial expression.  Eye contact was poor and she did not communicate.

## 2024-03-13 NOTE — BH INPATIENT PSYCHIATRY PROGRESS NOTE - MSE UNSTRUCTURED FT
Appearance: appears stated age, expressionless face, mildly disheveled  Behavior: slow to move, uncooperative, pacing hallways  Psychomotor: +PMR  Speech: unable to assess  Mood: unable to assess  Affect: dysphoric based on outward expression, stable  Thought process: unable to assess  Thought content: unable to assess  Perception: unable to assess  Memory: unable to assess  Concentration: unable to assess  Insight: unable to assess  Judgement: unable to assess  Impulse Control: appropriate to setting

## 2024-03-13 NOTE — BH INPATIENT PSYCHIATRY PROGRESS NOTE - NSBHASSESSSUMMFT_PSY_ALL_CORE
Ms. 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    The patient's presentation is most consistent with recurrent MDD with catatonic features (severe) in the context of medication non-adherence.  The patient reports depressed mood, decrease in appetite, change in sleep (increased), psychomotor retardation, somatic symptoms, decreased interest in activities and concentration.  Moreover, patient has catatonic features of staring/poor eye contact, poverty of speech, decrease mobility, and withdrawal.  Given history or MDD w/ catatonic features, suspect the patient has decompensated and would respond well to Ativan as she did during her last [facility] admission.  Maternal engagement resulted in temporary better medication adherence so will involve the mother closely in case.  Although depressed mood, patient is low SA risk given no prior history and no current thoughts of SI.  However, will continue to assess for degree of depression and SI once the patient's catatonia is addressed and improved.  The patient necessitates inpatient psychiatric admission and continued care due to danger to self as she is unable to care for basic needs in her current state.    3/13 - The patient continues to be catatonia without medication.  This finding strengthens the clinical decision to use Ativan to treat the patient's catatonia.  At this point, patient consistently refusing medications of psychiatric necessity, team is completing treatment over objection petition.     Plan:  - Continue Ativan 2mg po BID for catatonia; IM Ativan as needed when patient is refusing medication and posing danger to self  - Albuterol inh available d/t hx of asthma   - Plan for family meeting when patient's mother is able to encourage medication adherence  - Continue to monitor depression symptoms; consider adding SSRI once able to better assess after treatment of catatonia with benzodiazepines  - Encourage the patient to engage in therapeutic activities on the unit  - Treatment over objection

## 2024-03-14 PROCEDURE — 99231 SBSQ HOSP IP/OBS SF/LOW 25: CPT

## 2024-03-14 RX ORDER — PALIPERIDONE 1.5 MG/1
3 TABLET, EXTENDED RELEASE ORAL AT BEDTIME
Refills: 0 | Status: DISCONTINUED | OUTPATIENT
Start: 2024-03-14 | End: 2024-03-25

## 2024-03-14 NOTE — BH INPATIENT PSYCHIATRY PROGRESS NOTE - NSBHFUPINTERVALHXFT_PSY_A_CORE
Per staff report, patient continues to eat minimally and continues to refuse medications. Has been attending groups, appears to have connected with another patient on the floor. Per staff report, patient responded "no" to a question and has had other minimal communication begin to emerge. Patient was again pacing the hopson today with similar dysphoric affect and unwavering facial expression as yesterday. On approach today, eye contact was poor and she did not communicate.

## 2024-03-14 NOTE — BH INPATIENT PSYCHIATRY PROGRESS NOTE - NSBHMETABOLIC_PSY_ALL_CORE_FT
BMI: BMI (kg/m2): 27.3 (03-04-24 @ 21:11)  HbA1c:   Glucose:   BP: 117/80 (03-12-24 @ 09:00) (113/76 - 117/80)Vital Signs Last 24 Hrs  T(C): --  T(F): --  HR: --  BP: --  BP(mean): --  RR: --  SpO2: --      Lipid Panel:  BMI: BMI (kg/m2): 27.3 (03-04-24 @ 21:11)  HbA1c:   Glucose:   BP: 115/74 (04-11-24 @ 08:08) (109/74 - 120/74)Vital Signs Last 24 Hrs  T(C): --  T(F): --  HR: --  BP: --  BP(mean): --  RR: --  SpO2: --      Lipid Panel:

## 2024-03-15 PROCEDURE — 99231 SBSQ HOSP IP/OBS SF/LOW 25: CPT

## 2024-03-15 NOTE — BH INPATIENT PSYCHIATRY PROGRESS NOTE - NSBHASSESSSUMMFT_PSY_ALL_CORE
Ms. 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 before transfer to [facility] for tearfulness, decreased activity, and decreased appetite.    The patient's presentation is most consistent with recurrent MDD with catatonic features (severe) in the context of medication non-adherence.  The patient reports depressed mood, decrease in appetite, change in sleep (increased), psychomotor retardation, somatic symptoms, decreased interest in activities and concentration.  Moreover, patient has catatonic features of staring/poor eye contact, poverty of speech, decrease mobility, and withdrawal.  Given history or MDD w/ catatonic features, suspect the patient has decompensated and would respond well to Ativan as she did during her last [facility] admission.  Maternal engagement resulted in temporary better medication adherence so will involve the mother closely in case.  Although depressed mood, patient is low SA risk given no prior history and no current thoughts of SI.  However, will continue to assess for degree of depression and SI once the patient's catatonia is addressed and improved.  The patient necessitates inpatient psychiatric admission and continued care due to danger to self as she is unable to care for basic needs in her current state.    3/14 - The patient continues to be catatonia without medication.  This finding strengthens the clinical decision to use Ativan to treat the patient's catatonia.  At this point, patient consistently refusing medications of psychiatric necessity, team has submitted treatment over objection petition.     Plan:  - Continue Ativan 2mg po BID for catatonia; IM Ativan as needed when patient is refusing medication and posing danger to self  - Continue Invega 3 mg po at bedtime  - Albuterol inh available d/t hx of asthma   - Plan for family meeting when patient's mother is able to encourage medication adherence  - Continue to monitor depression symptoms; consider adding SSRI once able to better assess after treatment of catatonia with benzodiazepines  - Encourage the patient to engage in therapeutic activities on the unit  - Treatment over objection

## 2024-03-15 NOTE — BH INPATIENT PSYCHIATRY PROGRESS NOTE - NSBHMETABOLIC_PSY_ALL_CORE_FT
BMI: BMI (kg/m2): 27.3 (03-04-24 @ 21:11)  HbA1c:   Glucose:   BP: 119/84 (03-15-24 @ 09:22) (109/80 - 119/84)Vital Signs Last 24 Hrs  T(C): --  T(F): --  HR: 114 (03-15-24 @ 09:22) (114 - 114)  BP: 119/84 (03-15-24 @ 09:22) (119/84 - 119/84)  BP(mean): --  RR: 18 (03-15-24 @ 09:22) (18 - 18)  SpO2: 95% (03-15-24 @ 09:22) (95% - 95%)      Lipid Panel:  BMI: BMI (kg/m2): 27.3 (03-04-24 @ 21:11)  HbA1c:   Glucose:   BP: 115/74 (04-11-24 @ 08:08) (109/74 - 120/74)Vital Signs Last 24 Hrs  T(C): --  T(F): --  HR: --  BP: --  BP(mean): --  RR: --  SpO2: --      Lipid Panel:

## 2024-03-15 NOTE — BH INPATIENT PSYCHIATRY PROGRESS NOTE - MSE UNSTRUCTURED FT
Appearance: appears stated age, expressionless face, adequately groomed  Behavior: slow to move, uncooperative, sitting in chair  Psychomotor: +PMR  Speech: unable to assess  Mood: unable to assess  Affect: dysphoric based on outward expression, stable  Thought process: unable to assess  Thought content: unable to assess  Perception: unable to assess  Memory: unable to assess  Concentration: unable to assess  Insight: unable to assess  Judgement: unable to assess  Impulse Control: appropriate to setting

## 2024-03-15 NOTE — BH INPATIENT PSYCHIATRY PROGRESS NOTE - NSBHCHARTREVIEWVS_PSY_A_CORE FT
Vital Signs Last 24 Hrs  T(C): --  T(F): --  HR: 114 (03-15-24 @ 09:22) (114 - 114)  BP: 119/84 (03-15-24 @ 09:22) (119/84 - 119/84)  BP(mean): --  RR: 18 (03-15-24 @ 09:22) (18 - 18)  SpO2: 95% (03-15-24 @ 09:22) (95% - 95%)     Vital Signs Last 24 Hrs  T(C): --  T(F): --  HR: --  BP: --  BP(mean): --  RR: --  SpO2: --

## 2024-03-15 NOTE — BH INPATIENT PSYCHIATRY PROGRESS NOTE - NSBHFUPINTERVALHXFT_PSY_A_CORE
Per staff report, patient eats minimally and continues to refuse medications. Notably, another patient informed the team the patient has a water bottle that has some opacity due to bacterial growth or back-wash so that had to be taken from the patient.  Patient attends groups but does not engage with the activities.  Today, patient seen sitting in a chair in the hallway with unwavering facial expression.  Eye contact was poor and she did not communicate despite multiple attempts to engage.

## 2024-03-15 NOTE — BH INPATIENT PSYCHIATRY PROGRESS NOTE - CURRENT MEDICATION
MEDICATIONS  (STANDING):  LORazepam     Tablet 2 milliGRAM(s) Oral two times a day  paliperidone ER 3 milliGRAM(s) Oral at bedtime    MEDICATIONS  (PRN):  albuterol    90 MICROgram(s) HFA Inhaler 2 Puff(s) Inhalation every 6 hours PRN Shortness of Breath and/or Wheezing  aluminum hydroxide/magnesium hydroxide/simethicone Suspension 30 milliLiter(s) Oral every 6 hours PRN Dyspepsia  meclizine 12.5 milliGRAM(s) Oral every 6 hours PRN dizziness  ondansetron   Disintegrating Tablet 4 milliGRAM(s) Oral four times a day PRN nausea   MEDICATIONS  (STANDING):    MEDICATIONS  (PRN):

## 2024-03-15 NOTE — BH INPATIENT PSYCHIATRY PROGRESS NOTE - PRN MEDS
MEDICATIONS  (PRN):  albuterol    90 MICROgram(s) HFA Inhaler 2 Puff(s) Inhalation every 6 hours PRN Shortness of Breath and/or Wheezing  aluminum hydroxide/magnesium hydroxide/simethicone Suspension 30 milliLiter(s) Oral every 6 hours PRN Dyspepsia  meclizine 12.5 milliGRAM(s) Oral every 6 hours PRN dizziness  ondansetron   Disintegrating Tablet 4 milliGRAM(s) Oral four times a day PRN nausea   MEDICATIONS  (PRN):

## 2024-03-18 PROCEDURE — 99233 SBSQ HOSP IP/OBS HIGH 50: CPT

## 2024-03-18 NOTE — BH INPATIENT PSYCHIATRY PROGRESS NOTE - NSBHMETABOLIC_PSY_ALL_CORE_FT
BMI: BMI (kg/m2): 27.3 (03-04-24 @ 21:11)  HbA1c:   Glucose:   BP: 129/83 (03-18-24 @ 10:28) (103/79 - 129/83)Vital Signs Last 24 Hrs  T(C): 37.1 (03-18-24 @ 10:28), Max: 37.1 (03-18-24 @ 10:28)  T(F): 98.7 (03-18-24 @ 10:28), Max: 98.7 (03-18-24 @ 10:28)  HR: 129 (03-18-24 @ 10:28) (129 - 129)  BP: 129/83 (03-18-24 @ 10:28) (129/83 - 129/83)  BP(mean): --  RR: 16 (03-18-24 @ 10:28) (16 - 16)  SpO2: 98% (03-18-24 @ 10:28) (98% - 98%)      Lipid Panel:

## 2024-03-18 NOTE — BH INPATIENT PSYCHIATRY PROGRESS NOTE - PRN MEDS
MEDICATIONS  (PRN):  albuterol    90 MICROgram(s) HFA Inhaler 2 Puff(s) Inhalation every 6 hours PRN Shortness of Breath and/or Wheezing  aluminum hydroxide/magnesium hydroxide/simethicone Suspension 30 milliLiter(s) Oral every 6 hours PRN Dyspepsia  meclizine 12.5 milliGRAM(s) Oral every 6 hours PRN dizziness  ondansetron   Disintegrating Tablet 4 milliGRAM(s) Oral four times a day PRN nausea

## 2024-03-18 NOTE — BH INPATIENT PSYCHIATRY PROGRESS NOTE - NSBHASSESSSUMMFT_PSY_ALL_CORE
Ms. 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    The patient's presentation is most consistent with recurrent MDD with catatonic features (severe) in the context of medication non-adherence.  The patient reports depressed mood, decrease in appetite, change in sleep (increased), psychomotor retardation, somatic symptoms, decreased interest in activities and concentration.  Moreover, patient has catatonic features of staring/poor eye contact, poverty of speech, decrease mobility, and withdrawal.  Given history or MDD w/ catatonic features, suspect the patient has decompensated and would respond well to Ativan as she did during her last [facility] admission.  Maternal engagement resulted in temporary better medication adherence so will involve the mother closely in case.  Although depressed mood, patient is low SA risk given no prior history and no current thoughts of SI.  However, will continue to assess for degree of depression and SI once the patient's catatonia is addressed and improved.  The patient necessitates inpatient psychiatric admission and continued care due to danger to self as she is unable to care for basic needs in her current state.    3/14 - The patient continues to be catatonia without medication.  This finding strengthens the clinical decision to use Ativan to treat the patient's catatonia.  At this point, patient consistently refusing medications of psychiatric necessity, team has submitted treatment over objection petition.     Plan:  - Continue Ativan 2mg po BID for catatonia; IM Ativan as needed when patient is refusing medication and posing danger to self  - Continue Invega 3 mg po at bedtime  - Albuterol inh available d/t hx of asthma   - Plan for family meeting when patient's mother is able to encourage medication adherence  - Continue to monitor depression symptoms; consider adding SSRI once able to better assess after treatment of catatonia with benzodiazepines  - Encourage the patient to engage in therapeutic activities on the unit  - Treatment over objection Ms. 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    The patient's presentation is most consistent with recurrent MDD with catatonic features (severe) in the context of medication non-adherence.  The patient reports depressed mood, decrease in appetite, change in sleep (increased), psychomotor retardation, somatic symptoms, decreased interest in activities and concentration.  Moreover, patient has catatonic features of staring/poor eye contact, poverty of speech, decrease mobility, and withdrawal.  Given history or MDD w/ catatonic features, suspect the patient has decompensated and would respond well to Ativan as she did during her last [facility] admission.  Maternal engagement resulted in temporary better medication adherence so will involve the mother closely in case.  Although depressed mood, patient is low SA risk given no prior history and no current thoughts of SI.  However, will continue to assess for degree of depression and SI once the patient's catatonia is addressed and improved.  The patient necessitates inpatient psychiatric admission and continued care due to danger to self as she is unable to care for basic needs in her current state.    3/18 - The patient continues to be catatonic without medication.  This finding strengthens the clinical decision to use Ativan to treat the patient's catatonia.  At this point, patient consistently refusing medications of psychiatric necessity, team has submitted treatment over objection petition.     Plan:  - Continue Ativan 2mg po BID for catatonia; IM Ativan as needed when patient is refusing medication and posing danger to self  - Continue Invega 3 mg po at bedtime  - Albuterol inh available d/t hx of asthma  - Plan for family meeting when patient's mother is able to encourage medication adherence  - Continue to monitor depression symptoms; consider adding SSRI once able to better assess after treatment of catatonia with benzodiazepines  - Encourage the patient to engage in therapeutic activities on the unit  - Treatment over objection Ms. 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 before transfer to [facility] for tearfulness, decreased activity, and decreased appetite.    The patient's presentation is most consistent with recurrent MDD with catatonic features (severe) in the context of medication non-adherence.  The patient reports depressed mood, decrease in appetite, change in sleep (increased), psychomotor retardation, somatic symptoms, decreased interest in activities and concentration.  Moreover, patient has catatonic features of staring/poor eye contact, poverty of speech, decrease mobility, and withdrawal.  Given history or MDD w/ catatonic features, suspect the patient has decompensated and would respond well to Ativan as she did during her last [facility] admission.  Maternal engagement resulted in temporary better medication adherence so will involve the mother closely in case.  Although depressed mood, patient is low SA risk given no prior history and no current thoughts of SI.  However, will continue to assess for degree of depression and SI once the patient's catatonia is addressed and improved.  The patient necessitates inpatient psychiatric admission and continued care due to danger to self as she is unable to care for basic needs in her current state.    3/18 - The patient continues to be catatonic without medication.  This finding strengthens the clinical decision to use Ativan to treat the patient's catatonia.  At this point, patient consistently refusing medications of psychiatric necessity, team has submitted treatment over objection petition.     Plan:  - Increase Ativan 2mg po BID for catatonia to TID; IM Ativan as needed when patient is refusing medication and posing danger to self  - Continue Invega 3 mg po at bedtime  - Albuterol inh available d/t hx of asthma  - Plan for family meeting when patient's mother is able to encourage medication adherence  - Continue to monitor depression symptoms; consider adding SSRI once able to better assess after treatment of catatonia with benzodiazepines  - Encourage the patient to engage in therapeutic activities on the unit  - Treatment over objection

## 2024-03-18 NOTE — BH INPATIENT PSYCHIATRY PROGRESS NOTE - NSBHFUPINTERVALHXFT_PSY_A_CORE
Per staff report, patient eats minimally and continues to refuse medications. Notably, another patient informed the team the patient has a water bottle that has some opacity due to bacterial growth or back-wash so that had to be taken from the patient.  Patient attends groups but does not engage with the activities.  Today, patient seen sitting in a chair in the hallway with unwavering facial expression.  Eye contact was poor and she did not communicate despite multiple attempts to engage. Per staff report, patient eats minimally and continues to refuse medications. Patient attends groups but does not engage with the activities.  Today, patient seen sitting in a chair in the hallway with unwavering facial expression.  Eye contact was poor and she did not communicate despite multiple attempts to engage.  She is holding a nearly full water bottle that she spits into as this interviewer attempts to engage with the patient.

## 2024-03-18 NOTE — BH INPATIENT PSYCHIATRY PROGRESS NOTE - CURRENT MEDICATION
MEDICATIONS  (STANDING):  LORazepam     Tablet 2 milliGRAM(s) Oral two times a day  paliperidone ER 3 milliGRAM(s) Oral at bedtime    MEDICATIONS  (PRN):  albuterol    90 MICROgram(s) HFA Inhaler 2 Puff(s) Inhalation every 6 hours PRN Shortness of Breath and/or Wheezing  aluminum hydroxide/magnesium hydroxide/simethicone Suspension 30 milliLiter(s) Oral every 6 hours PRN Dyspepsia  meclizine 12.5 milliGRAM(s) Oral every 6 hours PRN dizziness  ondansetron   Disintegrating Tablet 4 milliGRAM(s) Oral four times a day PRN nausea   MEDICATIONS  (STANDING):  LORazepam     Tablet 2 milliGRAM(s) Oral three times a day  paliperidone ER 3 milliGRAM(s) Oral at bedtime    MEDICATIONS  (PRN):  albuterol    90 MICROgram(s) HFA Inhaler 2 Puff(s) Inhalation every 6 hours PRN Shortness of Breath and/or Wheezing  aluminum hydroxide/magnesium hydroxide/simethicone Suspension 30 milliLiter(s) Oral every 6 hours PRN Dyspepsia  meclizine 12.5 milliGRAM(s) Oral every 6 hours PRN dizziness  ondansetron   Disintegrating Tablet 4 milliGRAM(s) Oral four times a day PRN nausea

## 2024-03-18 NOTE — BH INPATIENT PSYCHIATRY PROGRESS NOTE - NSBHCHARTREVIEWVS_PSY_A_CORE FT
Vital Signs Last 24 Hrs  T(C): 37.1 (03-18-24 @ 10:28), Max: 37.1 (03-18-24 @ 10:28)  T(F): 98.7 (03-18-24 @ 10:28), Max: 98.7 (03-18-24 @ 10:28)  HR: 129 (03-18-24 @ 10:28) (129 - 129)  BP: 129/83 (03-18-24 @ 10:28) (129/83 - 129/83)  BP(mean): --  RR: 16 (03-18-24 @ 10:28) (16 - 16)  SpO2: 98% (03-18-24 @ 10:28) (98% - 98%)

## 2024-03-19 PROCEDURE — 99231 SBSQ HOSP IP/OBS SF/LOW 25: CPT

## 2024-03-19 NOTE — BH INPATIENT PSYCHIATRY PROGRESS NOTE - NSBHFUPINTERVALHXFT_PSY_A_CORE
Per staff report, patient eats minimally and continues to refuse medications. Patient attends groups but does not engage with the activities.  Today, patient seen sitting in a chair watching the TV with unwavering facial expression that seems confused given she is furrowing her eyebrows.  Eye contact was poor.  She is holding a nearly full water bottle that she spits into as this interviewer attempts to engage with the patient.  She only whispers "I'm dizzy" and does not answer any other questions.

## 2024-03-19 NOTE — BH INPATIENT PSYCHIATRY PROGRESS NOTE - MSE UNSTRUCTURED FT
Appearance: appears stated age, expressionless face, adequately groomed  Behavior: slow to move, uncooperative, sitting in chair  Psychomotor: +PMR  Speech: whispers once, unable to assess adequately  Mood: "I'm dizzy"  Affect: flat with dysphoria based on physical expression, stable  Thought process: unable to assess  Thought content: unable to assess  Perception: unable to assess  Memory: unable to assess  Concentration: unable to assess  Insight: unable to assess  Judgement: unable to assess  Impulse Control: appropriate to setting

## 2024-03-19 NOTE — BH CHART NOTE - NSEVENTNOTEFT_PSY_ALL_CORE
Pt seen by this MD alongside Dr. Lund and pt's legal Telida as part of legal proceedings for Treatment Over Objection. During today's encounter, the pt sat in a a chair, with psychomotor retardation quite evident, and although could be observed seemingly listening to the proceeding, barely spoke a word and when she did speak it was barely audible and often with mumbles.   She did not respond to questions asked by this MD.       It is this MD's opinion, that this patient, who has previously responded well to medication, and is being encouraged to take medication by her providers and her loving and supportive family, would greatly benefit from medication; the benefits of doing so at this time far-outweigh any risks.

## 2024-03-19 NOTE — BH INPATIENT PSYCHIATRY PROGRESS NOTE - CURRENT MEDICATION
MEDICATIONS  (STANDING):  LORazepam     Tablet 2 milliGRAM(s) Oral three times a day  paliperidone ER 3 milliGRAM(s) Oral at bedtime    MEDICATIONS  (PRN):  albuterol    90 MICROgram(s) HFA Inhaler 2 Puff(s) Inhalation every 6 hours PRN Shortness of Breath and/or Wheezing  aluminum hydroxide/magnesium hydroxide/simethicone Suspension 30 milliLiter(s) Oral every 6 hours PRN Dyspepsia  meclizine 12.5 milliGRAM(s) Oral every 6 hours PRN dizziness  ondansetron   Disintegrating Tablet 4 milliGRAM(s) Oral four times a day PRN nausea   MEDICATIONS  (STANDING):    MEDICATIONS  (PRN):

## 2024-03-19 NOTE — BH INPATIENT PSYCHIATRY PROGRESS NOTE - NSBHMETABOLIC_PSY_ALL_CORE_FT
BMI: BMI (kg/m2): 27.3 (03-04-24 @ 21:11)  HbA1c:   Glucose:   BP: 129/83 (03-18-24 @ 10:28) (103/79 - 129/83)Vital Signs Last 24 Hrs  T(C): --  T(F): --  HR: --  BP: --  BP(mean): --  RR: --  SpO2: --      Lipid Panel:  BMI: BMI (kg/m2): 27.3 (03-04-24 @ 21:11)  HbA1c:   Glucose:   BP: 115/74 (04-11-24 @ 08:08) (109/74 - 120/74)Vital Signs Last 24 Hrs  T(C): --  T(F): --  HR: --  BP: --  BP(mean): --  RR: --  SpO2: --      Lipid Panel:

## 2024-03-19 NOTE — BH INPATIENT PSYCHIATRY PROGRESS NOTE - NSBHASSESSSUMMFT_PSY_ALL_CORE
Ms. 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    The patient's presentation is most consistent with recurrent MDD with catatonic features (severe) in the context of medication non-adherence.  The patient reports depressed mood, decrease in appetite, change in sleep (increased), psychomotor retardation, somatic symptoms, decreased interest in activities and concentration.  Moreover, patient has catatonic features of staring/poor eye contact, poverty of speech, decrease mobility, and withdrawal.  Given history or MDD w/ catatonic features, suspect the patient has decompensated and would respond well to Ativan as she did during her last [facility] admission.  Maternal engagement resulted in temporary better medication adherence so will involve the mother closely in case.  Although depressed mood, patient is low SA risk given no prior history and no current thoughts of SI.  However, will continue to assess for degree of depression and SI once the patient's catatonia is addressed and improved.  The patient necessitates inpatient psychiatric admission and continued care due to danger to self as she is unable to care for basic needs in her current state.    3/19 - The patient continues to be catatonic without medication.  This finding strengthens the clinical decision to use Ativan to treat the patient's catatonia.  At this point, patient consistently refusing medications of psychiatric necessity, team has submitted treatment over objection with second opinion today.  Patient spoke once, but repeated that she is "dizzy," which is consistent with her presentation and suggests she feels this way even in the context of not being on Ativan.    Plan:  - Continiue Ativan 3mg po TID; IM Ativan as needed when patient is refusing medication and posing danger to self  - Continue Invega 3 mg po at bedtime  - Albuterol inh available d/t hx of asthma  - Plan for family meeting when patient's mother is able to encourage medication adherence  - Continue to monitor depression symptoms; consider adding SSRI once able to better assess after treatment of catatonia with benzodiazepines  - Encourage the patient to engage in therapeutic activities on the unit  - Treatment over objection

## 2024-03-20 PROCEDURE — 99231 SBSQ HOSP IP/OBS SF/LOW 25: CPT

## 2024-03-20 NOTE — BH INPATIENT PSYCHIATRY PROGRESS NOTE - NSBHASSESSSUMMFT_PSY_ALL_CORE
Ms. 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 before transfer to [facility] for tearfulness, decreased activity, and decreased appetite.    The patient's presentation is most consistent with recurrent MDD with catatonic features (severe) in the context of medication non-adherence.  The patient reports depressed mood, decrease in appetite, change in sleep (increased), psychomotor retardation, somatic symptoms, decreased interest in activities and concentration.  Moreover, patient has catatonic features of staring/poor eye contact, poverty of speech, decrease mobility, and withdrawal.  Given history or MDD w/ catatonic features, suspect the patient has decompensated and would respond well to Ativan as she did during her last [facility] admission.  Maternal engagement resulted in temporary better medication adherence so will involve the mother closely in case.  Although depressed mood, patient is low SA risk given no prior history and no current thoughts of SI.  However, will continue to assess for degree of depression and SI once the patient's catatonia is addressed and improved.  The patient necessitates inpatient psychiatric admission and continued care due to danger to self as she is unable to care for basic needs in her current state.    3/19 - The patient continues to be catatonic without medication.  This finding strengthens the clinical decision to use Ativan to treat the patient's catatonia.  At this point, patient consistently refusing medications of psychiatric necessity, team has submitted treatment over objection with second opinion today.  Patient spoke once, but repeated that she is "dizzy," which is consistent with her presentation and suggests she feels this way even in the context of not being on Ativan.    Plan:  - Continiue Ativan 3mg po TID; IM Ativan as needed when patient is refusing medication and posing danger to self  - Continue Invega 3 mg po at bedtime  - Albuterol inh available d/t hx of asthma  - Plan for family meeting when patient's mother is able to encourage medication adherence  - Continue to monitor depression symptoms; consider adding SSRI once able to better assess after treatment of catatonia with benzodiazepines  - Encourage the patient to engage in therapeutic activities on the unit  - Treatment over objection Ms. 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    The patient's presentation is most consistent with recurrent MDD with catatonic features (severe) in the context of medication non-adherence.  The patient reports depressed mood, decrease in appetite, change in sleep (increased), psychomotor retardation, somatic symptoms, decreased interest in activities and concentration.  Moreover, patient has catatonic features of staring/poor eye contact, poverty of speech, decrease mobility, and withdrawal.  Given history or MDD w/ catatonic features, suspect the patient has decompensated and would respond well to Ativan as she did during her last [facility] admission.  Maternal engagement resulted in temporary better medication adherence so will involve the mother closely in case.  Although depressed mood, patient is low SA risk given no prior history and no current thoughts of SI.  However, will continue to assess for degree of depression and SI once the patient's catatonia is addressed and improved.  The patient necessitates inpatient psychiatric admission and continued care due to danger to self as she is unable to care for basic needs in her current state.    3/20 - The patient continues to be catatonic without medication.  This finding strengthens the clinical decision to use Ativan to treat the patient's catatonia.  At this point, patient consistently refusing medications of psychiatric necessity, team has submitted treatment over objection and waiting for court case.  Patient has been without medication and catatonic for a long time so may need to give IM for dehydration soon and will continue to monitor.    Plan:  - Continue Ativan 3mg po TID; IM Ativan as needed when patient is refusing medication and posing danger to self  - Continue Invega 3 mg po at bedtime  - Albuterol inh available d/t hx of asthma  - Plan for family meeting when patient's mother is able to encourage medication adherence  - Continue to monitor depression symptoms; consider adding SSRI once able to better assess after treatment of catatonia with benzodiazepines  - Encourage the patient to engage in therapeutic activities on the unit  - Treatment over objection

## 2024-03-20 NOTE — BH INPATIENT PSYCHIATRY PROGRESS NOTE - MSE UNSTRUCTURED FT
Appearance: appears stated age, expressionless face, adequately groomed  Behavior: slow to move, uncooperative, sitting in chair  Psychomotor: +PMR  Speech: whispers once, unable to assess adequately  Mood: "I'm dizzy"  Affect: flat with dysphoria based on physical expression, stable  Thought process: unable to assess  Thought content: unable to assess  Perception: unable to assess  Memory: unable to assess  Concentration: unable to assess  Insight: unable to assess  Judgement: unable to assess  Impulse Control: appropriate to setting Appearance: appears stated age, expressionless face, adequately groomed  Behavior: slow to move, uncooperative, sitting in chair  Psychomotor: +PMR  Speech: whispers once, unable to assess adequately  Mood: unable to assess  Affect: dysphoria based on physical expression, stable  Thought process: unable to assess  Thought content: unable to assess  Perception: unable to assess  Memory: unable to assess  Concentration: unable to assess  Insight: unable to assess  Judgement: unable to assess  Impulse Control: appropriate to setting

## 2024-03-20 NOTE — BH INPATIENT PSYCHIATRY PROGRESS NOTE - NSBHFUPINTERVALHXFT_PSY_A_CORE
Per staff report, patient eats minimally and continues to refuse medications. Patient attends groups but does not engage with the activities.  Today, patient seen sitting in a chair watching the TV with unwavering facial expression that seems confused given she is furrowing her eyebrows.  Eye contact was poor.  She is holding a nearly full water bottle that she spits into as this interviewer attempts to engage with the patient.  She only whispers "I'm dizzy" and does not answer any other questions. Per staff report, patient eats minimally and continues to refuse medications. Patient attends groups but does not engage with the activities.  Today, patient seen sitting in a chair watching the TV with unwavering facial expression that seems confused given she is furrowing her eyebrows.  Eye contact was poor.  She whispers a short reply to initial questioning that is unable to be clearly heard.  She does not repeat herself and remains mute to further questioning.

## 2024-03-20 NOTE — BH INPATIENT PSYCHIATRY PROGRESS NOTE - NSBHCHARTREVIEWVS_PSY_A_CORE FT
Vital Signs Last 24 Hrs  T(C): --  T(F): --  HR: 102 (03-20-24 @ 09:10) (102 - 102)  BP: 126/86 (03-20-24 @ 09:10) (126/86 - 126/86)  BP(mean): --  RR: 18 (03-20-24 @ 09:10) (18 - 18)  SpO2: 100% (03-20-24 @ 09:10) (100% - 100%)     Vital Signs Last 24 Hrs  T(C): --  T(F): --  HR: --  BP: --  BP(mean): --  RR: --  SpO2: --

## 2024-03-20 NOTE — BH INPATIENT PSYCHIATRY PROGRESS NOTE - NSBHMETABOLIC_PSY_ALL_CORE_FT
BMI: BMI (kg/m2): 27.3 (03-04-24 @ 21:11)  HbA1c:   Glucose:   BP: 126/86 (03-20-24 @ 09:10) (102/68 - 129/83)Vital Signs Last 24 Hrs  T(C): --  T(F): --  HR: 102 (03-20-24 @ 09:10) (102 - 102)  BP: 126/86 (03-20-24 @ 09:10) (126/86 - 126/86)  BP(mean): --  RR: 18 (03-20-24 @ 09:10) (18 - 18)  SpO2: 100% (03-20-24 @ 09:10) (100% - 100%)      Lipid Panel:  BMI: BMI (kg/m2): 27.3 (03-04-24 @ 21:11)  HbA1c:   Glucose:   BP: 115/74 (04-11-24 @ 08:08) (109/74 - 120/74)Vital Signs Last 24 Hrs  T(C): --  T(F): --  HR: --  BP: --  BP(mean): --  RR: --  SpO2: --      Lipid Panel:

## 2024-03-21 PROCEDURE — 99233 SBSQ HOSP IP/OBS HIGH 50: CPT

## 2024-03-21 RX ADMIN — Medication 2 MILLIGRAM(S): at 22:14

## 2024-03-21 NOTE — BH INPATIENT PSYCHIATRY PROGRESS NOTE - NSBHCHARTREVIEWVS_PSY_A_CORE FT
Vital Signs Last 24 Hrs  T(C): --  T(F): --  HR: 103 (03-21-24 @ 09:00) (103 - 103)  BP: 106/74 (03-21-24 @ 09:00) (106/74 - 106/74)  BP(mean): --  RR: 18 (03-21-24 @ 09:00) (18 - 18)  SpO2: 100% (03-21-24 @ 09:00) (100% - 100%)

## 2024-03-21 NOTE — BH INPATIENT PSYCHIATRY PROGRESS NOTE - NSBHASSESSSUMMFT_PSY_ALL_CORE
Ms. 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    The patient's presentation is most consistent with recurrent MDD with catatonic features (severe) in the context of medication non-adherence.  The patient reports depressed mood, decrease in appetite, change in sleep (increased), psychomotor retardation, somatic symptoms, decreased interest in activities and concentration.  Moreover, patient has catatonic features of staring/poor eye contact, poverty of speech, decrease mobility, and withdrawal.  Given history or MDD w/ catatonic features, suspect the patient has decompensated and would respond well to Ativan as she did during her last [facility] admission.  Maternal engagement resulted in temporary better medication adherence so will involve the mother closely in case.  Although depressed mood, patient is low SA risk given no prior history and no current thoughts of SI.  However, will continue to assess for degree of depression and SI once the patient's catatonia is addressed and improved.  The patient necessitates inpatient psychiatric admission and continued care due to danger to self as she is unable to care for basic needs in her current state.    3/20 - The patient continues to be catatonic without medication.  This finding strengthens the clinical decision to use Ativan to treat the patient's catatonia.  At this point, patient consistently refusing medications of psychiatric necessity, team has submitted treatment over objection and waiting for court case.  Patient has been without medication and catatonic for a long time so may need to give IM for dehydration soon and will continue to monitor.    Plan:  - Continue Ativan 3mg po TID; IM Ativan as needed when patient is refusing medication and posing danger to self  - Continue Invega 3 mg po at bedtime  - Albuterol inh available d/t hx of asthma  - Plan for family meeting when patient's mother is able to encourage medication adherence  - Continue to monitor depression symptoms; consider adding SSRI once able to better assess after treatment of catatonia with benzodiazepines  - Encourage the patient to engage in therapeutic activities on the unit  - Treatment over objection

## 2024-03-21 NOTE — BH INPATIENT PSYCHIATRY PROGRESS NOTE - NSBHFUPINTERVALHXFT_PSY_A_CORE
granted treatment over objection today. Starting ativan 2 mg tid po, and if refused ativan 2 mg tid IM as per court order. Want to see how much improvement there is with consistent ativan before I force antipsychotic upon her. Unexpectedly pt attended court but was unable to speak or contribute when given an opportunity to do so. Spent 40 minutes in court, 5 minutes trying to explain repercussions of TOO to patient, and 10 min documenting.

## 2024-03-21 NOTE — BH INPATIENT PSYCHIATRY PROGRESS NOTE - CURRENT MEDICATION
MEDICATIONS  (STANDING):  LORazepam     Tablet 2 milliGRAM(s) Oral three times a day  LORazepam   Injectable 2 milliGRAM(s) IntraMuscular three times a day  paliperidone ER 3 milliGRAM(s) Oral at bedtime    MEDICATIONS  (PRN):  albuterol    90 MICROgram(s) HFA Inhaler 2 Puff(s) Inhalation every 6 hours PRN Shortness of Breath and/or Wheezing  aluminum hydroxide/magnesium hydroxide/simethicone Suspension 30 milliLiter(s) Oral every 6 hours PRN Dyspepsia  meclizine 12.5 milliGRAM(s) Oral every 6 hours PRN dizziness  ondansetron   Disintegrating Tablet 4 milliGRAM(s) Oral four times a day PRN nausea

## 2024-03-21 NOTE — BH INPATIENT PSYCHIATRY PROGRESS NOTE - MSE UNSTRUCTURED FT
Appearance: appears stated age, expressionless face, adequately groomed  Behavior: slow to move, uncooperative, sitting in chair  Psychomotor: +PMR  Speech: whispers once, unable to assess adequately  Mood: unable to assess  Affect: dysphoria based on physical expression, stable  Thought process: unable to assess  Thought content: unable to assess  Perception: unable to assess  Memory: unable to assess  Concentration: unable to assess  Insight: unable to assess  Judgement: unable to assess  Impulse Control: appropriate to setting

## 2024-03-21 NOTE — BH INPATIENT PSYCHIATRY PROGRESS NOTE - NSBHMETABOLIC_PSY_ALL_CORE_FT
BMI: BMI (kg/m2): 27.3 (03-04-24 @ 21:11)  HbA1c:   Glucose:   BP: 106/74 (03-21-24 @ 09:00) (102/68 - 126/86)Vital Signs Last 24 Hrs  T(C): --  T(F): --  HR: 103 (03-21-24 @ 09:00) (103 - 103)  BP: 106/74 (03-21-24 @ 09:00) (106/74 - 106/74)  BP(mean): --  RR: 18 (03-21-24 @ 09:00) (18 - 18)  SpO2: 100% (03-21-24 @ 09:00) (100% - 100%)      Lipid Panel:

## 2024-03-22 PROCEDURE — 99231 SBSQ HOSP IP/OBS SF/LOW 25: CPT

## 2024-03-22 RX ADMIN — Medication 2 MILLIGRAM(S): at 22:46

## 2024-03-22 RX ADMIN — Medication 2 MILLIGRAM(S): at 15:43

## 2024-03-22 RX ADMIN — Medication 2 MILLIGRAM(S): at 11:22

## 2024-03-22 NOTE — BH INPATIENT PSYCHIATRY PROGRESS NOTE - NSBHFUPINTERVALHXFT_PSY_A_CORE
The patient started receiving Ativan due to TOO.  Per nursing, the patient declined po and was very tearful when receiving IM Ativan.   The patient started receiving Ativan due to TOO.  Per nursing, the patient declined po and was very tearful when receiving IM Ativan.  Today, the patient is approached while she is in bed.  She moves around to reveal her face from under the sheets and makes good eye contact with this writer.  She says she is "fine," while giving this writer an irritated look.  She refuses to participate in further conversation.

## 2024-03-22 NOTE — BH INPATIENT PSYCHIATRY PROGRESS NOTE - NSBHCHARTREVIEWVS_PSY_A_CORE FT
Vital Signs Last 24 Hrs  T(C): --  T(F): --  HR: 115 (03-22-24 @ 09:12) (115 - 115)  BP: 113/71 (03-22-24 @ 09:12) (113/71 - 113/71)  BP(mean): --  RR: --  SpO2: 99% (03-22-24 @ 09:12) (99% - 99%)     Vital Signs Last 24 Hrs  T(C): --  T(F): --  HR: --  BP: --  BP(mean): --  RR: --  SpO2: --

## 2024-03-22 NOTE — BH INPATIENT PSYCHIATRY PROGRESS NOTE - PRN MEDS
MEDICATIONS  (PRN):  albuterol    90 MICROgram(s) HFA Inhaler 2 Puff(s) Inhalation every 6 hours PRN Shortness of Breath and/or Wheezing  aluminum hydroxide/magnesium hydroxide/simethicone Suspension 30 milliLiter(s) Oral every 6 hours PRN Dyspepsia  meclizine 12.5 milliGRAM(s) Oral every 6 hours PRN dizziness  ondansetron   Disintegrating Tablet 4 milliGRAM(s) Oral every 6 hours PRN Nausea and/or Vomiting   MEDICATIONS  (PRN):

## 2024-03-22 NOTE — BH INPATIENT PSYCHIATRY PROGRESS NOTE - MSE UNSTRUCTURED FT
Appearance: appears stated age, expressionless face, adequately groomed  Behavior: slow to move, uncooperative, sitting in chair  Psychomotor: +PMR  Speech: whispers once, unable to assess adequately  Mood: unable to assess  Affect: dysphoria based on physical expression, stable  Thought process: unable to assess  Thought content: unable to assess  Perception: unable to assess  Memory: unable to assess  Concentration: unable to assess  Insight: unable to assess  Judgement: unable to assess  Impulse Control: appropriate to setting Appearance: appears stated age, adequately groomed, increased amount of facial expression compared to prior  Behavior: laying in bed, good eye contact, uncooperative, sitting in chair  Psychomotor: +PMR, but more mild than prior  Speech: speaks once, unable to assess adequately  Mood: "fine"  Affect: mild irritability and dysphoria; stable  Thought process: unable to assess  Thought content: unable to assess  Perception: unable to assess  Memory: unable to assess  Concentration: unable to assess  Insight: unable to assess  Judgement: unable to assess  Impulse Control: appropriate to setting

## 2024-03-22 NOTE — BH INPATIENT PSYCHIATRY PROGRESS NOTE - CURRENT MEDICATION
MEDICATIONS  (STANDING):  LORazepam     Tablet 2 milliGRAM(s) Oral three times a day  LORazepam   Injectable 2 milliGRAM(s) IntraMuscular three times a day  paliperidone ER 3 milliGRAM(s) Oral at bedtime    MEDICATIONS  (PRN):  albuterol    90 MICROgram(s) HFA Inhaler 2 Puff(s) Inhalation every 6 hours PRN Shortness of Breath and/or Wheezing  aluminum hydroxide/magnesium hydroxide/simethicone Suspension 30 milliLiter(s) Oral every 6 hours PRN Dyspepsia  meclizine 12.5 milliGRAM(s) Oral every 6 hours PRN dizziness  ondansetron   Disintegrating Tablet 4 milliGRAM(s) Oral every 6 hours PRN Nausea and/or Vomiting   MEDICATIONS  (STANDING):    MEDICATIONS  (PRN):

## 2024-03-22 NOTE — BH INPATIENT PSYCHIATRY PROGRESS NOTE - NSBHASSESSSUMMFT_PSY_ALL_CORE
Ms. 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 before transfer to [facility] for tearfulness, decreased activity, and decreased appetite.    The patient's presentation is most consistent with recurrent MDD with catatonic features (severe) in the context of medication non-adherence.  The patient reports depressed mood, decrease in appetite, change in sleep (increased), psychomotor retardation, somatic symptoms, decreased interest in activities and concentration.  Moreover, patient has catatonic features of staring/poor eye contact, poverty of speech, decrease mobility, and withdrawal.  Given history or MDD w/ catatonic features, suspect the patient has decompensated and would respond well to Ativan as she did during her last [facility] admission.  Maternal engagement resulted in temporary better medication adherence so will involve the mother closely in case.  Although depressed mood, patient is low SA risk given no prior history and no current thoughts of SI.  However, will continue to assess for degree of depression and SI once the patient's catatonia is addressed and improved.  The patient necessitates inpatient psychiatric admission and continued care due to danger to self as she is unable to care for basic needs in her current state.    3/22 - TOO completed and patient now receiving Ativan.  She was minimally engaged, but more than prior.  Suspect low engagement is more volitional than catatonic in nature given she is upset about receiving medication.  Will continue to monitor and assess for need of an antipsychotic after the patient has been receiving Ativan consistently.    Plan:  - Continue Ativan 3mg po TID; IM Ativan as needed when patient is refusing medication and posing danger to self  - Continue Invega 3 mg po at bedtime  - Albuterol inh available d/t hx of asthma  - Plan for family meeting when patient's mother is able to encourage medication adherence  - Continue to monitor depression symptoms; consider adding SSRI once able to better assess after treatment of catatonia with benzodiazepines  - Encourage the patient to engage in therapeutic activities on the unit  - Treatment over objection granted

## 2024-03-22 NOTE — BH INPATIENT PSYCHIATRY PROGRESS NOTE - NSBHMETABOLIC_PSY_ALL_CORE_FT
BMI: BMI (kg/m2): 27.3 (03-04-24 @ 21:11)  HbA1c:   Glucose:   BP: 113/71 (03-22-24 @ 09:12) (106/74 - 126/86)Vital Signs Last 24 Hrs  T(C): --  T(F): --  HR: 115 (03-22-24 @ 09:12) (115 - 115)  BP: 113/71 (03-22-24 @ 09:12) (113/71 - 113/71)  BP(mean): --  RR: --  SpO2: 99% (03-22-24 @ 09:12) (99% - 99%)      Lipid Panel:  BMI: BMI (kg/m2): 27.3 (03-04-24 @ 21:11)  HbA1c:   Glucose:   BP: 115/74 (04-11-24 @ 08:08) (109/74 - 120/74)Vital Signs Last 24 Hrs  T(C): --  T(F): --  HR: --  BP: --  BP(mean): --  RR: --  SpO2: --      Lipid Panel:

## 2024-03-23 RX ADMIN — Medication 2 MILLIGRAM(S): at 22:43

## 2024-03-23 RX ADMIN — Medication 2 MILLIGRAM(S): at 15:29

## 2024-03-23 RX ADMIN — Medication 2 MILLIGRAM(S): at 10:55

## 2024-03-24 RX ADMIN — Medication 2 MILLIGRAM(S): at 11:52

## 2024-03-24 RX ADMIN — Medication 2 MILLIGRAM(S): at 22:27

## 2024-03-24 RX ADMIN — Medication 2 MILLIGRAM(S): at 15:38

## 2024-03-25 PROCEDURE — 99231 SBSQ HOSP IP/OBS SF/LOW 25: CPT

## 2024-03-25 RX ORDER — FLUOXETINE HCL 10 MG
10 CAPSULE ORAL AT BEDTIME
Refills: 0 | Status: DISCONTINUED | OUTPATIENT
Start: 2024-03-25 | End: 2024-03-26

## 2024-03-25 RX ADMIN — Medication 2 MILLIGRAM(S): at 22:37

## 2024-03-25 RX ADMIN — Medication 2 MILLIGRAM(S): at 13:00

## 2024-03-25 RX ADMIN — Medication 2 MILLIGRAM(S): at 17:49

## 2024-03-25 NOTE — BH INPATIENT PSYCHIATRY PROGRESS NOTE - NSBHASSESSSUMMFT_PSY_ALL_CORE
Ms. 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    The patient's presentation is most consistent with recurrent MDD with catatonic features (severe) in the context of medication non-adherence.  The patient reports depressed mood, decrease in appetite, change in sleep (increased), psychomotor retardation, somatic symptoms, decreased interest in activities and concentration.  Moreover, patient has catatonic features of staring/poor eye contact, poverty of speech, decrease mobility, and withdrawal.  Given history or MDD w/ catatonic features, suspect the patient has decompensated and would respond well to Ativan as she did during her last [facility] admission.  Maternal engagement resulted in temporary better medication adherence so will involve the mother closely in case.  Although depressed mood, patient is low SA risk given no prior history and no current thoughts of SI.  However, will continue to assess for degree of depression and SI once the patient's catatonia is addressed and improved.  The patient necessitates inpatient psychiatric admission and continued care due to danger to self as she is unable to care for basic needs in her current state.    3/25 - The patient's catatonia is improving with administration of Ativan TID.  Notably, she does not seem to understand the concept of why she is getting IM and that oral medication would avoid that route.  She still has PMR and similar complaints about dizziness.  Suspect the patient may need addition antipsychotics as she has been receiving Ativan consistently with some but not as much improvement as would have been expected which raises concern there may be an underlying psychotic process occurring that is resulting in negative symptoms.    Plan:  - Continue Ativan 3mg po TID; IM Ativan as needed when patient is refusing medication as allowable by TOO  - Continue Invega 3 mg po at bedtime; IM Invega as needed when patient is refusing medication as allowable by TOO  - Albuterol inh available d/t hx of asthma  - Plan for family meeting when patient's mother is able to encourage medication adherence  - Continue to monitor depression symptoms; consider adding SSRI once able to better assess after treatment of catatonia with benzodiazepines  - Encourage the patient to engage in therapeutic activities on the unit  - Treatment over objection Ms. 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    The patient's presentation is most consistent with recurrent MDD with catatonic features (severe) in the context of medication non-adherence.  The patient reports depressed mood, decrease in appetite, change in sleep (increased), psychomotor retardation, somatic symptoms, decreased interest in activities and concentration.  Moreover, patient has catatonic features of staring/poor eye contact, poverty of speech, decrease mobility, and withdrawal.  Given history or MDD w/ catatonic features, suspect the patient has decompensated and would respond well to Ativan as she did during her last [facility] admission.  Maternal engagement resulted in temporary better medication adherence so will involve the mother closely in case.  Although depressed mood, patient is low SA risk given no prior history and no current thoughts of SI.  However, will continue to assess for degree of depression and SI once the patient's catatonia is addressed and improved.  The patient necessitates inpatient psychiatric admission and continued care due to danger to self as she is unable to care for basic needs in her current state.    3/25 - The patient's catatonia is improving with administration of Ativan TID.  Notably, she does not seem to understand the concept of why she is getting IM and that oral medication would avoid that route.  She still has PMR and similar complaints about dizziness.  Patient remains dysphoric appearing, so in addition to continued Ativan, will add SSRI    Plan:  - Continue Ativan 3mg po TID; IM Ativan as needed when patient is refusing medication as allowable by TOO  - Start Prozac 10mg po daily  - Discontinue Invega 3 mg po at bedtime  - Albuterol inh available d/t hx of asthma  - Plan for family meeting when patient's mother is able to encourage medication adherence  - Continue to monitor depression symptoms; consider adding SSRI once able to better assess after treatment of catatonia with benzodiazepines  - Encourage the patient to engage in therapeutic activities on the unit  - Treatment over objection

## 2024-03-25 NOTE — BH INPATIENT PSYCHIATRY PROGRESS NOTE - MSE UNSTRUCTURED FT
Appearance: appears stated age, adequately groomed, increased amount of facial expression compared to prior  Behavior: sitting at desk, fair eye contact, partially cooperative  Psychomotor: +PMR, but more mild than prior  Speech: minimal, speech latency, soft volume, irritable tone at times   Mood: "okay"  Affect: dysphoric, mild irritability; stable; mood incongruent  Thought process: poverty of speech; limited ability to evaluate given brief patient answers  Thought content: annoyed with having to take medications; reports SI, but denies plan; denies HI  Perception: unable to assess - patient does not answer  Memory: unable to assess - patient does not provide any information about how she has been or changed in a meaningful way  Concentration: fair, not formally tested  Insight: poor  Judgement: poor  Impulse Control: appropriate to setting

## 2024-03-25 NOTE — BH INPATIENT PSYCHIATRY PROGRESS NOTE - NSBHCHARTREVIEWVS_PSY_A_CORE FT
Vital Signs Last 24 Hrs  T(C): 37 (03-24-24 @ 17:26), Max: 37 (03-24-24 @ 17:26)  T(F): 98.6 (03-24-24 @ 17:26), Max: 98.6 (03-24-24 @ 17:26)  HR: 100 (03-25-24 @ 09:15) (100 - 111)  BP: 105/72 (03-25-24 @ 09:15) (105/72 - 105/72)  BP(mean): --  RR: 18 (03-25-24 @ 09:15) (18 - 18)  SpO2: 100% (03-25-24 @ 09:15) (100% - 100%)     Vital Signs Last 24 Hrs  T(C): --  T(F): --  HR: --  BP: --  BP(mean): --  RR: --  SpO2: --

## 2024-03-25 NOTE — BH INPATIENT PSYCHIATRY PROGRESS NOTE - NSBHMETABOLIC_PSY_ALL_CORE_FT
BMI: BMI (kg/m2): 27.3 (03-04-24 @ 21:11)  HbA1c:   Glucose:   BP: 105/72 (03-25-24 @ 09:15) (92/61 - 116/81)Vital Signs Last 24 Hrs  T(C): 37 (03-24-24 @ 17:26), Max: 37 (03-24-24 @ 17:26)  T(F): 98.6 (03-24-24 @ 17:26), Max: 98.6 (03-24-24 @ 17:26)  HR: 100 (03-25-24 @ 09:15) (100 - 111)  BP: 105/72 (03-25-24 @ 09:15) (105/72 - 105/72)  BP(mean): --  RR: 18 (03-25-24 @ 09:15) (18 - 18)  SpO2: 100% (03-25-24 @ 09:15) (100% - 100%)      Lipid Panel:  BMI: BMI (kg/m2): 27.3 (03-04-24 @ 21:11)  HbA1c:   Glucose:   BP: 115/74 (04-11-24 @ 08:08) (109/74 - 120/74)Vital Signs Last 24 Hrs  T(C): --  T(F): --  HR: --  BP: --  BP(mean): --  RR: --  SpO2: --      Lipid Panel:

## 2024-03-25 NOTE — BH INPATIENT PSYCHIATRY PROGRESS NOTE - NSBHFUPINTERVALHXFT_PSY_A_CORE
Per report, the patient has been refusing po Ativan and instead been receiving Ativan 2mg IM TID per TOO since 3/21.    The patient is seen moving around the unit swifter than the prior week.  She is approached as she is sitting at her desk playing with a straw wrapper with one hand and another hand holding her head up.  She makes intermittent eye contact and provided limited responses though she is more enlargeable than when she was not taking Ativan.  She reports that she is "okay," but does not elaborate.  She questions why she needs to take medications and says she sees no benefit in taking them.  She continues to report being "dizzy," which she attributes to the medications.  After discussing with the patient the benefits of medication and how treatment over objection works, the patient repeats, "but the medications don't help."  She does not provide answer about what has been on her mind or how the weekend went for her.  She does irritably say, "yes" when asked about depressive thoughts.  She does not answer if she has been having SI, but does deny a having a plan to harm herself.  She denies HI.  She does not answer about AVH.

## 2024-03-26 PROCEDURE — 99231 SBSQ HOSP IP/OBS SF/LOW 25: CPT

## 2024-03-26 RX ORDER — FLUOXETINE HCL 10 MG
10 CAPSULE ORAL DAILY
Refills: 0 | Status: DISCONTINUED | OUTPATIENT
Start: 2024-03-26 | End: 2024-03-27

## 2024-03-26 RX ADMIN — Medication 2 MILLIGRAM(S): at 11:51

## 2024-03-26 RX ADMIN — Medication 2 MILLIGRAM(S): at 15:09

## 2024-03-26 RX ADMIN — Medication 2 MILLIGRAM(S): at 22:38

## 2024-03-26 NOTE — BH INPATIENT PSYCHIATRY PROGRESS NOTE - MSE UNSTRUCTURED FT
Appearance: appears stated age, disheveled hair, increased amount of facial expression compared to prior  Behavior: sitting at desk, fair eye contact, partially cooperative  Psychomotor: +PMR, but more mild than prior  Speech: minimal, speech latency, soft volume  Mood: "fine"  Affect: dysphoric, mild irritability; stable; mood incongruent  Thought process: linear, illogical at times especially regarding medications; poverty of speech  Thought content: annoyed with having to take medications; denies SI, HI  Perception: denies AVH  Memory: notably, patient is either unwilling to discuss or seemingly does not recount how she was acting prior to receiving Ativan  Concentration: fair, not formally tested  Insight: poor  Judgement: poor  Impulse Control: appropriate to setting

## 2024-03-26 NOTE — BH INPATIENT PSYCHIATRY PROGRESS NOTE - NSBHCHARTREVIEWVS_PSY_A_CORE FT
Vital Signs Last 24 Hrs  T(C): --  T(F): --  HR: 101 (03-26-24 @ 09:25) (98 - 101)  BP: 111/75 (03-26-24 @ 09:25) (106/75 - 111/75)  BP(mean): --  RR: 18 (03-26-24 @ 09:25) (18 - 18)  SpO2: 100% (03-26-24 @ 09:25) (100% - 100%)     Vital Signs Last 24 Hrs  T(C): --  T(F): --  HR: --  BP: --  BP(mean): --  RR: --  SpO2: --

## 2024-03-26 NOTE — BH INPATIENT PSYCHIATRY PROGRESS NOTE - NSBHMETABOLIC_PSY_ALL_CORE_FT
BMI: BMI (kg/m2): 27.3 (03-04-24 @ 21:11)  HbA1c:   Glucose:   BP: 111/75 (03-26-24 @ 09:25) (92/61 - 111/75)Vital Signs Last 24 Hrs  T(C): --  T(F): --  HR: 101 (03-26-24 @ 09:25) (98 - 101)  BP: 111/75 (03-26-24 @ 09:25) (106/75 - 111/75)  BP(mean): --  RR: 18 (03-26-24 @ 09:25) (18 - 18)  SpO2: 100% (03-26-24 @ 09:25) (100% - 100%)      Lipid Panel:  BMI: BMI (kg/m2): 27.3 (03-04-24 @ 21:11)  HbA1c:   Glucose:   BP: 115/74 (04-11-24 @ 08:08) (109/74 - 120/74)Vital Signs Last 24 Hrs  T(C): --  T(F): --  HR: --  BP: --  BP(mean): --  RR: --  SpO2: --      Lipid Panel:

## 2024-03-26 NOTE — BH INPATIENT PSYCHIATRY PROGRESS NOTE - CURRENT MEDICATION
MEDICATIONS  (STANDING):  FLUoxetine 10 milliGRAM(s) Oral at bedtime  LORazepam     Tablet 2 milliGRAM(s) Oral three times a day  LORazepam   Injectable 2 milliGRAM(s) IntraMuscular three times a day    MEDICATIONS  (PRN):  albuterol    90 MICROgram(s) HFA Inhaler 2 Puff(s) Inhalation every 6 hours PRN Shortness of Breath and/or Wheezing  aluminum hydroxide/magnesium hydroxide/simethicone Suspension 30 milliLiter(s) Oral every 6 hours PRN Dyspepsia  meclizine 12.5 milliGRAM(s) Oral every 6 hours PRN dizziness  ondansetron   Disintegrating Tablet 4 milliGRAM(s) Oral every 6 hours PRN Nausea and/or Vomiting   MEDICATIONS  (STANDING):  FLUoxetine 10 milliGRAM(s) Oral daily  LORazepam     Tablet 2 milliGRAM(s) Oral three times a day  LORazepam   Injectable 2 milliGRAM(s) IntraMuscular three times a day    MEDICATIONS  (PRN):  albuterol    90 MICROgram(s) HFA Inhaler 2 Puff(s) Inhalation every 6 hours PRN Shortness of Breath and/or Wheezing  aluminum hydroxide/magnesium hydroxide/simethicone Suspension 30 milliLiter(s) Oral every 6 hours PRN Dyspepsia  meclizine 12.5 milliGRAM(s) Oral every 6 hours PRN dizziness  ondansetron   Disintegrating Tablet 4 milliGRAM(s) Oral every 6 hours PRN Nausea and/or Vomiting   MEDICATIONS  (STANDING):    MEDICATIONS  (PRN):

## 2024-03-26 NOTE — BH INPATIENT PSYCHIATRY PROGRESS NOTE - NSBHFUPINTERVALHXFT_PSY_A_CORE
Per report and chart review, the patient accepted oral Ativan yesterday, but needed IM Ativan.  Patient refused SSRI.  The patient is seen leaving her bedroom, appearing as if she just woke up rubbing her eyes and with hair disheveled.  She is cooperative and calm but has speech latency and overall poverty of content.  She reports that she is "doing fine."  She expresses she does not want to take the Ativan po or IM.  After providing education about her situation and the TOO, the patient seems to understand that she has a choice between receiving the medication po versus IM.  She continues to say the medication makes her "dizzy" but she also says "sleepy" today.  She does not share insight into the benefits of medication or how she presents differently now that she has been taking medications relative to when she was not receiving medication.  She says the only thing on her mind is how to stop taking medications.  She seems confused when asked about why she declined her SSRI. She denies SI, HI, and AVH.

## 2024-03-26 NOTE — BH INPATIENT PSYCHIATRY PROGRESS NOTE - NSBHASSESSSUMMFT_PSY_ALL_CORE
Ms. 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    The patient's presentation is most consistent with recurrent MDD with catatonic features (severe) in the context of medication non-adherence.  The patient reports depressed mood, decrease in appetite, change in sleep (increased), psychomotor retardation, somatic symptoms, decreased interest in activities and concentration.  Moreover, patient has catatonic features of staring/poor eye contact, poverty of speech, decrease mobility, and withdrawal.  Given history or MDD w/ catatonic features, suspect the patient has decompensated and would respond well to Ativan as she did during her last [facility] admission.  Maternal engagement resulted in temporary better medication adherence so will involve the mother closely in case.  Although depressed mood, patient is low SA risk given no prior history and no current thoughts of SI.  However, will continue to assess for degree of depression and SI once the patient's catatonia is addressed and improved.  The patient necessitates inpatient psychiatric admission and continued care due to danger to self as she is unable to care for basic needs in her current state.    3/26 - The patient's catatonia is improving with administration of Ativan TID.  Notably, she does not seem to understand the concept of why she is getting IM and that oral medication would avoid that route.  She still has PMR and similar complaints about dizziness.  Patient remains dysphoric appearing, so in addition to continued Ativan.  SSRI will continue to be offered despite patient refusal.    Plan:  - Continue Ativan 2mg po TID; IM Ativan as needed when patient is refusing medication as allowable by TOO  - Continue Prozac 10mg po daily  - Discontinue Invega 3 mg po at bedtime  - Albuterol inh available d/t hx of asthma  - Plan for family meeting when patient's mother is able to encourage medication adherence  - Continue to monitor depression symptoms; consider adding SSRI once able to better assess after treatment of catatonia with benzodiazepines  - Encourage the patient to engage in therapeutic activities on the unit  - Treatment over objection Ms. 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    The patient's presentation is most consistent with recurrent MDD with catatonic features (severe) in the context of medication non-adherence.  The patient reports depressed mood, decrease in appetite, change in sleep (increased), psychomotor retardation, somatic symptoms, decreased interest in activities and concentration.  Moreover, patient has catatonic features of staring/poor eye contact, poverty of speech, decrease mobility, and withdrawal.  Given history or MDD w/ catatonic features, suspect the patient has decompensated and would respond well to Ativan as she did during her last [facility] admission.  Maternal engagement resulted in temporary better medication adherence so will involve the mother closely in case.  Although depressed mood, patient is low SA risk given no prior history and no current thoughts of SI.  However, will continue to assess for degree of depression and SI once the patient's catatonia is addressed and improved.  The patient necessitates inpatient psychiatric admission and continued care due to danger to self as she is unable to care for basic needs in her current state.    3/26 - The patient's catatonia is improving with administration of Ativan TID.  Notably, she does not seem to understand the concept of why she is getting IM and that oral medication would avoid that route.  She still has PMR and similar complaints about dizziness.  Patient remains dysphoric appearing, so in addition to continued Ativan.  SSRI will continue to be offered despite patient refusal.    Plan:  - Continue Ativan 2mg po TID; IM Ativan as needed when patient is refusing medication as allowable by TOO  - Continue Prozac 10mg po daily  - Discontinue Invega 3 mg po at bedtime  - Albuterol inh available d/t hx of asthma  - Plan for family meeting when patient's mother is able to encourage medication adherence  - Encourage the patient to engage in therapeutic activities on the unit  - Treatment over objection

## 2024-03-27 PROCEDURE — 99231 SBSQ HOSP IP/OBS SF/LOW 25: CPT

## 2024-03-27 RX ORDER — FLUOXETINE HCL 10 MG
20 CAPSULE ORAL DAILY
Refills: 0 | Status: DISCONTINUED | OUTPATIENT
Start: 2024-03-27 | End: 2024-04-11

## 2024-03-27 RX ADMIN — Medication 2 MILLIGRAM(S): at 22:30

## 2024-03-27 RX ADMIN — Medication 2 MILLIGRAM(S): at 15:19

## 2024-03-27 RX ADMIN — Medication 20 MILLIGRAM(S): at 15:19

## 2024-03-27 RX ADMIN — Medication 2 MILLIGRAM(S): at 11:54

## 2024-03-27 NOTE — BH INPATIENT PSYCHIATRY PROGRESS NOTE - CURRENT MEDICATION
MEDICATIONS  (STANDING):  FLUoxetine Solution 20 milliGRAM(s) Oral daily  LORazepam     Tablet 2 milliGRAM(s) Oral three times a day  LORazepam   Injectable 2 milliGRAM(s) IntraMuscular three times a day    MEDICATIONS  (PRN):  albuterol    90 MICROgram(s) HFA Inhaler 2 Puff(s) Inhalation every 6 hours PRN Shortness of Breath and/or Wheezing  aluminum hydroxide/magnesium hydroxide/simethicone Suspension 30 milliLiter(s) Oral every 6 hours PRN Dyspepsia  meclizine 12.5 milliGRAM(s) Oral every 6 hours PRN dizziness  ondansetron   Disintegrating Tablet 4 milliGRAM(s) Oral every 6 hours PRN Nausea and/or Vomiting   MEDICATIONS  (STANDING):    MEDICATIONS  (PRN):

## 2024-03-27 NOTE — BH INPATIENT PSYCHIATRY PROGRESS NOTE - NSBHCHARTREVIEWVS_PSY_A_CORE FT
Vital Signs Last 24 Hrs  T(C): 37.1 (03-26-24 @ 16:37), Max: 37.1 (03-26-24 @ 16:37)  T(F): 98.8 (03-26-24 @ 16:37), Max: 98.8 (03-26-24 @ 16:37)  HR: 107 (03-27-24 @ 09:58) (93 - 107)  BP: 108/75 (03-27-24 @ 09:58) (107/74 - 108/75)  BP(mean): --  RR: --  SpO2: 99% (03-27-24 @ 09:58) (99% - 100%)     Vital Signs Last 24 Hrs  T(C): --  T(F): --  HR: --  BP: --  BP(mean): --  RR: --  SpO2: --

## 2024-03-27 NOTE — BH INPATIENT PSYCHIATRY PROGRESS NOTE - MSE UNSTRUCTURED FT
Appearance: appears stated age, mildly disheveled  Behavior: reclining in bed, fair eye contact, cooperative,  increased amount of facial expression compared to prior, increased energy  Psychomotor: +PMR, but more mild than prior  Speech: regular rate, regular rhythm, soft volume  Mood: "okay"  Affect: mildly dysphoric; stable; mood incongruent  Thought process: linear, illogical at times regarding medications; poverty of speech  Thought content: annoyed with having to take medications; denies SI, HI  Perception: denies AVH  Memory: notably, patient is either unwilling to discuss or seemingly does not recount how she was acting prior to receiving Ativan  Concentration: fair, not formally tested  Insight: poor  Judgement: poor  Impulse Control: appropriate to setting

## 2024-03-27 NOTE — BH INPATIENT PSYCHIATRY PROGRESS NOTE - NSBHASSESSSUMMFT_PSY_ALL_CORE
Ms. 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    The patient's presentation is most consistent with recurrent MDD with catatonic features (severe) in the context of medication non-adherence.  The patient reports depressed mood, decrease in appetite, change in sleep (increased), psychomotor retardation, somatic symptoms, decreased interest in activities and concentration.  Moreover, patient has catatonic features of staring/poor eye contact, poverty of speech, decrease mobility, and withdrawal.  Given history or MDD w/ catatonic features, suspect the patient has decompensated and would respond well to Ativan as she did during her last [facility] admission.  Maternal engagement resulted in temporary better medication adherence so will involve the mother closely in case.  Although depressed mood, patient is low SA risk given no prior history and no current thoughts of SI.  However, will continue to assess for degree of depression and SI once the patient's catatonia is addressed and improved.  The patient necessitates inpatient psychiatric admission and continued care due to danger to self as she is unable to care for basic needs in her current state.    3/27 - The patient's catatonia is improving with administration of Ativan TID.  Notably, she does not seem to understand the concept of why she is getting IM and that oral medication would avoid that route.  She still has PMR and similar complaints about dizziness.  Patient remains dysphoric appearing, so in addition to Ativan SSRI will be continued to be offered despite patient refusal.  Attempted to contact patient's mother as a family meeting would be useful at this point.    Plan:  - Continue Ativan 2mg po TID; IM Ativan as needed when patient is refusing medication as allowable by TOO  - Continue Prozac 10mg po daily (switch from tablets to solutions)  - Albuterol inh available d/t hx of asthma  - Plan for family meeting when patient's mother is able to encourage medication adherence  - Encourage the patient to engage in therapeutic activities on the unit  - Treatment over objection

## 2024-03-27 NOTE — BH INPATIENT PSYCHIATRY PROGRESS NOTE - NSBHFUPINTERVALHXFT_PSY_A_CORE
Per report and chart review, the patient accepted oral Ativan yesterday.  Patient refused SSRI, requesting that it be switched to a solution.  She is seen reading a book while on her bed.  She says she is "okay," but then immediately asks the provider if she can switch to a different medication despite having received the answer to this question the days prior.  She continues to report "dizziness" as a medication side effect.  She denies SI, HI, and AVH.    Note: Attempted to contact patient's mother at [phone] using a  ID#[id number] to coordinate a family meeting.  Voicemail left encouraging the patient's mother to call back.

## 2024-03-27 NOTE — BH INPATIENT PSYCHIATRY PROGRESS NOTE - NSBHMETABOLIC_PSY_ALL_CORE_FT
BMI: BMI (kg/m2): 27.3 (03-04-24 @ 21:11)  HbA1c:   Glucose:   BP: 108/75 (03-27-24 @ 09:58) (105/72 - 111/75)Vital Signs Last 24 Hrs  T(C): 37.1 (03-26-24 @ 16:37), Max: 37.1 (03-26-24 @ 16:37)  T(F): 98.8 (03-26-24 @ 16:37), Max: 98.8 (03-26-24 @ 16:37)  HR: 107 (03-27-24 @ 09:58) (93 - 107)  BP: 108/75 (03-27-24 @ 09:58) (107/74 - 108/75)  BP(mean): --  RR: --  SpO2: 99% (03-27-24 @ 09:58) (99% - 100%)      Lipid Panel:  BMI: BMI (kg/m2): 27.3 (03-04-24 @ 21:11)  HbA1c:   Glucose:   BP: 115/74 (04-11-24 @ 08:08) (109/74 - 120/74)Vital Signs Last 24 Hrs  T(C): --  T(F): --  HR: --  BP: --  BP(mean): --  RR: --  SpO2: --      Lipid Panel:

## 2024-03-28 PROCEDURE — 99232 SBSQ HOSP IP/OBS MODERATE 35: CPT

## 2024-03-28 RX ADMIN — Medication 20 MILLIGRAM(S): at 11:58

## 2024-03-28 RX ADMIN — Medication 2 MILLIGRAM(S): at 09:53

## 2024-03-28 RX ADMIN — Medication 2 MILLIGRAM(S): at 15:14

## 2024-03-28 RX ADMIN — Medication 2 MILLIGRAM(S): at 22:16

## 2024-03-28 NOTE — BH INPATIENT PSYCHIATRY PROGRESS NOTE - CURRENT MEDICATION
MEDICATIONS  (STANDING):  FLUoxetine Solution 20 milliGRAM(s) Oral daily  LORazepam     Tablet 2 milliGRAM(s) Oral three times a day  LORazepam   Injectable 2 milliGRAM(s) IntraMuscular three times a day    MEDICATIONS  (PRN):  albuterol    90 MICROgram(s) HFA Inhaler 2 Puff(s) Inhalation every 6 hours PRN Shortness of Breath and/or Wheezing  aluminum hydroxide/magnesium hydroxide/simethicone Suspension 30 milliLiter(s) Oral every 6 hours PRN Dyspepsia  meclizine 12.5 milliGRAM(s) Oral every 6 hours PRN dizziness  ondansetron   Disintegrating Tablet 4 milliGRAM(s) Oral every 6 hours PRN Nausea and/or Vomiting

## 2024-03-28 NOTE — BH INPATIENT PSYCHIATRY PROGRESS NOTE - NSBHFUPINTERVALHXFT_PSY_A_CORE
in bed; constricted and internally preoccupied but as best as can be determined Not endorsing  suicidal or homicidal ideation intent or plans; no mention of auditory or visual hallucinations  doesn't likes meds because she feels "too sleepy"  i&J: poor: limited if any awareness of medications; guarded or minimally acknowledging sxs; not reflective on issues that impact on symptoms

## 2024-03-28 NOTE — BH INPATIENT PSYCHIATRY PROGRESS NOTE - NSBHCHARTREVIEWVS_PSY_A_CORE FT
Vital Signs Last 24 Hrs  T(C): 36.3 (03-28-24 @ 10:00), Max: 36.6 (03-27-24 @ 21:00)  T(F): 97.3 (03-28-24 @ 10:00), Max: 97.8 (03-27-24 @ 21:00)  HR: 107 (03-28-24 @ 10:00) (93 - 107)  BP: 94/63 (03-28-24 @ 10:00) (94/63 - 111/78)  BP(mean): --  RR: 18 (03-28-24 @ 10:00) (18 - 18)  SpO2: 97% (03-28-24 @ 10:00) (96% - 100%)

## 2024-03-28 NOTE — BH INPATIENT PSYCHIATRY PROGRESS NOTE - NSBHMETABOLIC_PSY_ALL_CORE_FT
BMI: BMI (kg/m2): 27.3 (03-04-24 @ 21:11)  HbA1c:   Glucose:   BP: 94/63 (03-28-24 @ 10:00) (94/63 - 111/78)Vital Signs Last 24 Hrs  T(C): 36.3 (03-28-24 @ 10:00), Max: 36.6 (03-27-24 @ 21:00)  T(F): 97.3 (03-28-24 @ 10:00), Max: 97.8 (03-27-24 @ 21:00)  HR: 107 (03-28-24 @ 10:00) (93 - 107)  BP: 94/63 (03-28-24 @ 10:00) (94/63 - 111/78)  BP(mean): --  RR: 18 (03-28-24 @ 10:00) (18 - 18)  SpO2: 97% (03-28-24 @ 10:00) (96% - 100%)      Lipid Panel:

## 2024-03-28 NOTE — BH INPATIENT PSYCHIATRY PROGRESS NOTE - PRN MEDS
MEDICATIONS  (PRN):  albuterol    90 MICROgram(s) HFA Inhaler 2 Puff(s) Inhalation every 6 hours PRN Shortness of Breath and/or Wheezing  aluminum hydroxide/magnesium hydroxide/simethicone Suspension 30 milliLiter(s) Oral every 6 hours PRN Dyspepsia  meclizine 12.5 milliGRAM(s) Oral every 6 hours PRN dizziness  ondansetron   Disintegrating Tablet 4 milliGRAM(s) Oral every 6 hours PRN Nausea and/or Vomiting

## 2024-03-28 NOTE — BH INPATIENT PSYCHIATRY PROGRESS NOTE - NSBHASSESSSUMMFT_PSY_ALL_CORE
Ms. 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    The patient's presentation is most consistent with recurrent MDD with catatonic features (severe) in the context of medication non-adherence.  The patient reports depressed mood, decrease in appetite, change in sleep (increased), psychomotor retardation, somatic symptoms, decreased interest in activities and concentration.  Moreover, patient has catatonic features of staring/poor eye contact, poverty of speech, decrease mobility, and withdrawal.  Given history or MDD w/ catatonic features, suspect the patient has decompensated and would respond well to Ativan as she did during her last [facility] admission.  Maternal engagement resulted in temporary better medication adherence so will involve the mother closely in case.  Although depressed mood, patient is low SA risk given no prior history and no current thoughts of SI.  However, will continue to assess for degree of depression and SI once the patient's catatonia is addressed and improved.  The patient necessitates inpatient psychiatric admission and continued care due to danger to self as she is unable to care for basic needs in her current state.    3/27 - The patient's catatonia is improving with administration of Ativan TID.  Notably, she does not seem to understand the concept of why she is getting IM and that oral medication would avoid that route.  She still has PMR and similar complaints about dizziness.  Patient remains dysphoric appearing, so in addition to Ativan SSRI will be continued to be offered despite patient refusal.  Attempted to contact patient's mother as a family meeting would be useful at this point.    Plan:  - Continue Ativan 2mg po TID; IM Ativan as needed when patient is refusing medication as allowable by TOO  - Continue Prozac 10mg po daily (switch from tablets to solutions)  - Albuterol inh available d/t hx of asthma  - Plan for family meeting when patient's mother is able to encourage medication adherence  - Encourage the patient to engage in therapeutic activities on the unit  - Treatment over objection  ;;03/28: in bed; constricted and internally preoccupied but as best as can be determined Not endorsing  suicidal or homicidal ideation intent or plans; no mention of auditory or visual hallucinations  doesn't likes meds because she feels "too sleepy"  i&J: poor: limited if any awareness of medications; guarded or minimally acknowledging sxs; not reflective on issues that impact on symptoms

## 2024-03-29 PROCEDURE — 99231 SBSQ HOSP IP/OBS SF/LOW 25: CPT

## 2024-03-29 RX ORDER — PALIPERIDONE 1.5 MG/1
3 TABLET, EXTENDED RELEASE ORAL AT BEDTIME
Refills: 0 | Status: DISCONTINUED | OUTPATIENT
Start: 2024-03-29 | End: 2024-04-11

## 2024-03-29 RX ORDER — HALOPERIDOL DECANOATE 100 MG/ML
5 INJECTION INTRAMUSCULAR DAILY
Refills: 0 | Status: DISCONTINUED | OUTPATIENT
Start: 2024-03-29 | End: 2024-03-31

## 2024-03-29 RX ADMIN — Medication 2 MILLIGRAM(S): at 10:40

## 2024-03-29 RX ADMIN — Medication 2 MILLIGRAM(S): at 22:03

## 2024-03-29 RX ADMIN — Medication 2 MILLIGRAM(S): at 13:57

## 2024-03-29 NOTE — BH INPATIENT PSYCHIATRY PROGRESS NOTE - NSBHMETABOLIC_PSY_ALL_CORE_FT
BMI: BMI (kg/m2): 27.3 (03-04-24 @ 21:11)  HbA1c:   Glucose:   BP: 106/69 (03-29-24 @ 09:18) (94/63 - 111/78)Vital Signs Last 24 Hrs  T(C): --  T(F): --  HR: 85 (03-29-24 @ 09:18) (85 - 95)  BP: 106/69 (03-29-24 @ 09:18) (106/69 - 110/76)  BP(mean): --  RR: 18 (03-29-24 @ 09:18) (18 - 18)  SpO2: 98% (03-29-24 @ 09:18) (98% - 99%)      Lipid Panel:  BMI: BMI (kg/m2): 27.3 (03-04-24 @ 21:11)  HbA1c:   Glucose:   BP: 115/74 (04-11-24 @ 08:08) (109/74 - 120/74)Vital Signs Last 24 Hrs  T(C): --  T(F): --  HR: --  BP: --  BP(mean): --  RR: --  SpO2: --      Lipid Panel:

## 2024-03-29 NOTE — BH INPATIENT PSYCHIATRY PROGRESS NOTE - NSBHFUPINTERVALHXFT_PSY_A_CORE
Per report and chart review, the patient intermittently accepting oral Ativanand is refusing liquid fluoxetine. She is seen flipping through a book while on her bed.  She says she is "fine."  Patient perseverates on not wanting Ativan or any medications.  She continues to report "dizziness" as a medication side effect.  She denies SI, HI, and AVH.  At one point another patient's farts loudly enough that it can be heard and the patient's affect brightens and she laughs.

## 2024-03-29 NOTE — BH INPATIENT PSYCHIATRY PROGRESS NOTE - NSBHASSESSSUMMFT_PSY_ALL_CORE
Ms. 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    The patient's presentation is most consistent with recurrent MDD with catatonic features (severe) in the context of medication non-adherence.  The patient reports depressed mood, decrease in appetite, change in sleep (increased), psychomotor retardation, somatic symptoms, decreased interest in activities and concentration.  Moreover, patient has catatonic features of staring/poor eye contact, poverty of speech, decrease mobility, and withdrawal.  Given history or MDD w/ catatonic features, suspect the patient has decompensated and would respond well to Ativan as she did during her last [facility] admission.  Maternal engagement resulted in temporary better medication adherence so will involve the mother closely in case.  Although depressed mood, patient is low SA risk given no prior history and no current thoughts of SI.  However, will continue to assess for degree of depression and SI once the patient's catatonia is addressed and improved.  The patient necessitates inpatient psychiatric admission and continued care due to danger to self as she is unable to care for basic needs in her current state.    3/29 - The patient's catatonia is improving with administration of Ativan TID.  Notably, she does not seem to understand the concept of why she is getting IM and that oral medication would avoid that route.  She still has PMR and similar complaints about dizziness.  Patient remains dysphoric appearing, so in addition to Ativan SSRI will be continued to be offered despite patient refusal.  Patient is improved but still possibly showing negative psychotic features so will start Invega and assess for effectiveness.    Plan:  - Continue Ativan 2mg po TID; IM Ativan as needed when patient is refusing medication as allowable by TOO  - Continue Prozac 10mg po daily (switch from tablets to solutions)  - Start Invega 3mg po per TOO; if the patient rejects the medication, can give Haldol 5mg IM  - Albuterol inh available d/t hx of asthma  - Plan for family meeting when patient's mother is able to encourage medication adherence  - Encourage the patient to engage in therapeutic activities on the unit  - Treatment over objection

## 2024-03-29 NOTE — BH INPATIENT PSYCHIATRY PROGRESS NOTE - NSBHCHARTREVIEWVS_PSY_A_CORE FT
Vital Signs Last 24 Hrs  T(C): --  T(F): --  HR: 85 (03-29-24 @ 09:18) (85 - 95)  BP: 106/69 (03-29-24 @ 09:18) (106/69 - 110/76)  BP(mean): --  RR: 18 (03-29-24 @ 09:18) (18 - 18)  SpO2: 98% (03-29-24 @ 09:18) (98% - 99%)     Vital Signs Last 24 Hrs  T(C): --  T(F): --  HR: --  BP: --  BP(mean): --  RR: --  SpO2: --

## 2024-03-29 NOTE — BH INPATIENT PSYCHIATRY PROGRESS NOTE - MSE UNSTRUCTURED FT
Appearance: appears stated age, mildly disheveled  Behavior: reclining in bed, fair eye contact, cooperative,  increased amount of facial expression compared to prior, increased energy  Psychomotor: +PMR, but more mild than prior  Speech: regular rate, regular rhythm, soft volume  Mood: "fine"  Affect: mildly dysphoric; stable; mood incongruent  Thought process: linear, illogical at times regarding medications; poverty of speech  Thought content: annoyed with having to take medications; denies SI, HI  Perception: denies AVH  Memory: notably, patient is either unwilling to discuss or seemingly does not recount how she was acting prior to receiving Ativan  Concentration: fair, not formally tested  Insight: poor  Judgement: poor  Impulse Control: appropriate to setting

## 2024-03-30 RX ADMIN — Medication 2 MILLIGRAM(S): at 21:55

## 2024-03-30 RX ADMIN — Medication 2 MILLIGRAM(S): at 10:11

## 2024-03-30 RX ADMIN — Medication 2 MILLIGRAM(S): at 15:45

## 2024-03-31 RX ORDER — HALOPERIDOL DECANOATE 100 MG/ML
5 INJECTION INTRAMUSCULAR AT BEDTIME
Refills: 0 | Status: DISCONTINUED | OUTPATIENT
Start: 2024-03-31 | End: 2024-04-11

## 2024-03-31 RX ADMIN — Medication 2 MILLIGRAM(S): at 23:06

## 2024-03-31 RX ADMIN — HALOPERIDOL DECANOATE 5 MILLIGRAM(S): 100 INJECTION INTRAMUSCULAR at 23:05

## 2024-03-31 RX ADMIN — Medication 2 MILLIGRAM(S): at 10:47

## 2024-03-31 RX ADMIN — Medication 2 MILLIGRAM(S): at 16:19

## 2024-04-01 PROCEDURE — 99231 SBSQ HOSP IP/OBS SF/LOW 25: CPT

## 2024-04-01 RX ADMIN — PALIPERIDONE 3 MILLIGRAM(S): 1.5 TABLET, EXTENDED RELEASE ORAL at 22:17

## 2024-04-01 RX ADMIN — Medication 2 MILLIGRAM(S): at 22:17

## 2024-04-01 RX ADMIN — Medication 2 MILLIGRAM(S): at 11:37

## 2024-04-01 RX ADMIN — Medication 20 MILLIGRAM(S): at 11:34

## 2024-04-01 NOTE — BH INPATIENT PSYCHIATRY PROGRESS NOTE - NSBHCHARTREVIEWVS_PSY_A_CORE FT
Vital Signs Last 24 Hrs  T(C): 36.1 (03-31-24 @ 18:21), Max: 36.1 (03-31-24 @ 18:21)  T(F): 96.9 (03-31-24 @ 18:21), Max: 96.9 (03-31-24 @ 18:21)  HR: 102 (04-01-24 @ 08:53) (97 - 102)  BP: 102/69 (04-01-24 @ 08:53) (102/69 - 119/82)  BP(mean): --  RR: 16 (03-31-24 @ 18:21) (16 - 16)  SpO2: 96% (04-01-24 @ 08:53) (96% - 100%)

## 2024-04-01 NOTE — BH INPATIENT PSYCHIATRY PROGRESS NOTE - CURRENT MEDICATION
MEDICATIONS  (STANDING):  FLUoxetine Solution 20 milliGRAM(s) Oral daily  haloperidol    Injectable 5 milliGRAM(s) IntraMuscular at bedtime  LORazepam     Tablet 2 milliGRAM(s) Oral three times a day  LORazepam   Injectable 2 milliGRAM(s) IntraMuscular three times a day  paliperidone ER 3 milliGRAM(s) Oral at bedtime    MEDICATIONS  (PRN):  albuterol    90 MICROgram(s) HFA Inhaler 2 Puff(s) Inhalation every 6 hours PRN Shortness of Breath and/or Wheezing  aluminum hydroxide/magnesium hydroxide/simethicone Suspension 30 milliLiter(s) Oral every 6 hours PRN Dyspepsia  meclizine 12.5 milliGRAM(s) Oral every 6 hours PRN dizziness  ondansetron   Disintegrating Tablet 4 milliGRAM(s) Oral every 6 hours PRN Nausea and/or Vomiting

## 2024-04-01 NOTE — BH INPATIENT PSYCHIATRY PROGRESS NOTE - NSBHFUPINTERVALHXFT_PSY_A_CORE
Patient seen in her room, on approach is standing next to bed holding her stomach. She states that she does not want to take medications because they make her feel dizzy and cause her to talk slowly. She also states that everytime she touches her abdomen she feels nausea. When asked about positives of medication regimen she states that it causes her to think of other things. No si/hi/avh or PI endorsed. Appears despondent. MAR reviewed, pt was compliant with morning medications, last night refused po and received conditional IM medications.   per staff report pt is unkempt, selectively mute, isolative to room.

## 2024-04-01 NOTE — BH INPATIENT PSYCHIATRY PROGRESS NOTE - NSBHMETABOLIC_PSY_ALL_CORE_FT
BMI: BMI (kg/m2): 27.3 (03-04-24 @ 21:11)  HbA1c:   Glucose:   BP: 102/69 (04-01-24 @ 08:53) (102/69 - 119/82)Vital Signs Last 24 Hrs  T(C): 36.1 (03-31-24 @ 18:21), Max: 36.1 (03-31-24 @ 18:21)  T(F): 96.9 (03-31-24 @ 18:21), Max: 96.9 (03-31-24 @ 18:21)  HR: 102 (04-01-24 @ 08:53) (97 - 102)  BP: 102/69 (04-01-24 @ 08:53) (102/69 - 119/82)  BP(mean): --  RR: 16 (03-31-24 @ 18:21) (16 - 16)  SpO2: 96% (04-01-24 @ 08:53) (96% - 100%)      Lipid Panel:

## 2024-04-01 NOTE — BH INPATIENT PSYCHIATRY PROGRESS NOTE - NSBHASSESSSUMMFT_PSY_ALL_CORE
Ms. 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    The patient's presentation is most consistent with recurrent MDD with catatonic features (severe) in the context of medication non-adherence.  The patient reports depressed mood, decrease in appetite, change in sleep (increased), psychomotor retardation, somatic symptoms, decreased interest in activities and concentration.  Moreover, patient has catatonic features of staring/poor eye contact, poverty of speech, decrease mobility, and withdrawal.  Given history or MDD w/ catatonic features, suspect the patient has decompensated and would respond well to Ativan as she did during her last [facility] admission.  Maternal engagement resulted in temporary better medication adherence so will involve the mother closely in case.  Although depressed mood, patient is low SA risk given no prior history and no current thoughts of SI.  However, will continue to assess for degree of depression and SI once the patient's catatonia is addressed and improved.  The patient necessitates inpatient psychiatric admission and continued care due to danger to self as she is unable to care for basic needs in her current state.    3/29 - The patient's catatonia is improving with administration of Ativan TID.  Notably, she does not seem to understand the concept of why she is getting IM and that oral medication would avoid that route.  She still has PMR and similar complaints about dizziness.  Patient remains dysphoric appearing, so in addition to Ativan SSRI will be continued to be offered despite patient refusal.  Patient is improved but still possibly showing negative psychotic features so will start Invega and assess for effectiveness.    Plan:  - Continue Ativan 2mg po TID; IM Ativan as needed when patient is refusing medication as allowable by TOO  - Continue Prozac 10mg po daily (switch from tablets to solutions)  - Start Invega 3mg po per TOO; if the patient rejects the medication, can give Haldol 5mg IM  - Albuterol inh available d/t hx of asthma  - Plan for family meeting when patient's mother is able to encourage medication adherence  - Encourage the patient to engage in therapeutic activities on the unit  - Treatment over objection

## 2024-04-01 NOTE — BH INPATIENT PSYCHIATRY PROGRESS NOTE - NSBHMSESPABN_PSY_A_CORE
Soft volume/Slowed rate/Increased latency
Soft volume/Slowed rate
Soft volume/Slowed rate/Increased latency
Soft volume/Slowed rate/Increased latency

## 2024-04-02 PROCEDURE — 99231 SBSQ HOSP IP/OBS SF/LOW 25: CPT

## 2024-04-02 RX ADMIN — Medication 1.5 MILLIGRAM(S): at 21:51

## 2024-04-02 RX ADMIN — Medication 2 MILLIGRAM(S): at 14:25

## 2024-04-02 RX ADMIN — HALOPERIDOL DECANOATE 5 MILLIGRAM(S): 100 INJECTION INTRAMUSCULAR at 21:50

## 2024-04-02 NOTE — BH INPATIENT PSYCHIATRY PROGRESS NOTE - NSBHMETABOLIC_PSY_ALL_CORE_FT
BMI: BMI (kg/m2): 27.3 (03-04-24 @ 21:11)  HbA1c:   Glucose:   BP: 111/74 (04-01-24 @ 17:28) (102/69 - 119/82)Vital Signs Last 24 Hrs  T(C): --  T(F): --  HR: 104 (04-01-24 @ 17:28) (104 - 104)  BP: 111/74 (04-01-24 @ 17:28) (111/74 - 111/74)  BP(mean): --  RR: --  SpO2: 98% (04-01-24 @ 17:28) (98% - 98%)      Lipid Panel:  BMI: BMI (kg/m2): 27.3 (03-04-24 @ 21:11)  HbA1c:   Glucose:   BP: 115/74 (04-11-24 @ 08:08) (109/74 - 120/74)Vital Signs Last 24 Hrs  T(C): --  T(F): --  HR: --  BP: --  BP(mean): --  RR: --  SpO2: --      Lipid Panel:

## 2024-04-02 NOTE — BH INPATIENT PSYCHIATRY PROGRESS NOTE - NSBHATTESTCOMMENTATTENDFT_PSY_A_CORE
Selective mutism, catatonic, not engaging much with team. Trial of ativan. I know pt from last admission here and she improved on prozac and ativan. Antipsychotic did not seem so helpful last time.
Will start consistent trial of ativan now that we have gotten TOO. Holding off on antipsychotic for now. 
Pt c/o dizziness. Selectively mute. Catatonia. For TOO. 
Mute, odd behavior including carrying a bottle of her own spit. Concerns she is not eating enough but doesn't appear dehydrated. For TOO
Refusing prozac and still needing IMs of ativan and refusing PO ativan at times. On TOO. Far from baseline still. 
Pt starting to accept PO ativan more often. Still far from baseline,. Will arrange meeting with mother to discuss AOT, ACT and discharge planning. 
Still refusing SSRI sand complaining of ativan. On TOO,. Improving slowly. 
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  Pt previously responsive to Ativan however at this time is refusing, IM administration of Ativan was offered to the pt however she declined. Will continue efforts to encourage medication compliance. 
Catatonic, selective mutism. Often refusing meds but unclear if she is refusing food. Not sure she qualifies for emergency IMs at this time. [place] treatment over objection.
I spoke with pt's mother in [place], as I am fluent. She verifies that she supports treatment over objection as she worries she has not been eating enough. 
Improving with TOO ativan titration. Remains with poor insight. Team has discussed ACT & AOT. 
Mute, not eating at times, for TOO
Mute, not eating enough, for TOO
For TOO for catatonia that is affecting her function and appetite. 
Improving on ativan with TOO. Less catatonic. 
Case d/w residents; pt seen on 8U, 22yo F hx MDD with PF admitted with catatonia, refusing all Rx.  Pt ambulating slowly on unit, sipping water and spitting into a cup.  No agitation.  +negativism.  No echophenomena.  Pt did attend community meeting this afternoon.  Pt stared at writer, could not engage further, mute, though appeared to acknowledge a peer reading to her.  Pt not amenable to physical exam.  Tachycardia noted, no sx reported, pt eating minimally.  Encourage lorazepam 2mg TID LELO.  TOO submitted.
;;03/04: Not endorsing  suicidal or homicidal ideation intent or plans; no mention of auditory or visual hallucinations ; blocked but makes spontaneous statements even approaching the writer in the hallway asking to only take Zofram not wanting to take Ativan but discussed issue of catatonic reaction and need for Ativan for now.  Does want to take Prozac.  Noted to be improving by staff.

## 2024-04-02 NOTE — BH INPATIENT PSYCHIATRY PROGRESS NOTE - CURRENT MEDICATION
MEDICATIONS  (STANDING):  FLUoxetine Solution 20 milliGRAM(s) Oral daily  haloperidol    Injectable 5 milliGRAM(s) IntraMuscular at bedtime  LORazepam     Tablet 2 milliGRAM(s) Oral three times a day  LORazepam   Injectable 2 milliGRAM(s) IntraMuscular three times a day  paliperidone ER 3 milliGRAM(s) Oral at bedtime    MEDICATIONS  (PRN):  albuterol    90 MICROgram(s) HFA Inhaler 2 Puff(s) Inhalation every 6 hours PRN Shortness of Breath and/or Wheezing  aluminum hydroxide/magnesium hydroxide/simethicone Suspension 30 milliLiter(s) Oral every 6 hours PRN Dyspepsia  meclizine 12.5 milliGRAM(s) Oral every 6 hours PRN dizziness  ondansetron   Disintegrating Tablet 4 milliGRAM(s) Oral every 6 hours PRN Nausea and/or Vomiting   MEDICATIONS  (STANDING):    MEDICATIONS  (PRN):

## 2024-04-02 NOTE — BH INPATIENT PSYCHIATRY PROGRESS NOTE - NSBHATTESTTYPESTAFF_PSY_A_CORE
Resident
Student
Resident
Student
Resident/Student
Resident

## 2024-04-02 NOTE — BH INPATIENT PSYCHIATRY PROGRESS NOTE - NSBHASSESSSUMMFT_PSY_ALL_CORE
Ms. 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 before transfer to [facility] for tearfulness, decreased activity, and decreased appetite.    The patient's presentation is most consistent with recurrent MDD with catatonic features (severe) in the context of medication non-adherence.  The patient reports depressed mood, decrease in appetite, change in sleep (increased), psychomotor retardation, somatic symptoms, decreased interest in activities and concentration.  Moreover, patient has catatonic features of staring/poor eye contact, poverty of speech, decrease mobility, and withdrawal.  Given history or MDD w/ catatonic features, suspect the patient has decompensated and would respond well to Ativan as she did during her last [facility] admission.  Maternal engagement resulted in temporary better medication adherence so will involve the mother closely in case.  Although depressed mood, patient is low SA risk given no prior history and no current thoughts of SI.  However, will continue to assess for degree of depression and SI once the patient's catatonia is addressed and improved.  The patient necessitates inpatient psychiatric admission and continued care due to danger to self as she is unable to care for basic needs in her current state.    3/29 - The patient's catatonia is improving with administration of Ativan TID.  Notably, she does not seem to understand the concept of why she is getting IM and that oral medication would avoid that route.  She still has PMR and similar complaints about dizziness.  Patient remains dysphoric appearing, so in addition to Ativan SSRI will be continued to be offered despite patient refusal.  Patient is improved but still possibly showing negative psychotic features so will start Invega and assess for effectiveness.    4/2 - The patient is less engaged and will not explain why.  Although less engaged she did meaningfully look at this writer and report feeling fine.  The patient is most likely upset about needing to take medications.    Plan:  - Continue Ativan 2mg po TID; IM Ativan as needed when patient is refusing medication as allowable by TOO  - Continue Prozac 10mg po daily (switch from tablets to solutions)  - Continue Invega 3mg po per TOO; if the patient rejects the medication, please give Haldol 5mg IM  - Albuterol inh available d/t hx of asthma  - Plan for family meeting when patient's mother is able to encourage medication adherence  - Encourage the patient to engage in therapeutic activities on the unit  - Treatment over objection Ms. 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    The patient's presentation is most consistent with recurrent MDD with catatonic features (severe) in the context of medication non-adherence.  The patient reports depressed mood, decrease in appetite, change in sleep (increased), psychomotor retardation, somatic symptoms, decreased interest in activities and concentration.  Moreover, patient has catatonic features of staring/poor eye contact, poverty of speech, decrease mobility, and withdrawal.  Given history or MDD w/ catatonic features, suspect the patient has decompensated and would respond well to Ativan as she did during her last [facility] admission.  Maternal engagement resulted in temporary better medication adherence so will involve the mother closely in case.  Although depressed mood, patient is low SA risk given no prior history and no current thoughts of SI.  However, will continue to assess for degree of depression and SI once the patient's catatonia is addressed and improved.  The patient necessitates inpatient psychiatric admission and continued care due to danger to self as she is unable to care for basic needs in her current state.    3/29 - The patient's catatonia is improving with administration of Ativan TID.  Notably, she does not seem to understand the concept of why she is getting IM and that oral medication would avoid that route.  She still has PMR and similar complaints about dizziness.  Patient remains dysphoric appearing, so in addition to Ativan SSRI will be continued to be offered despite patient refusal.  Patient is improved but still possibly showing negative psychotic features so will start Invega and assess for effectiveness.    4/2 - The patient is less engaged and will not explain why.  Although less engaged she did meaningfully look at this writer and report feeling fine.  Suspect patient non-engagement is more volitional than psychotic or catatonic in nature.  Given patient has been taking Ativan 2mg TID with good effect but the patient has been reporting "dizziness" days prior, will try to decrease patient Ativan and see how she responds.    Plan:  - Decrease Ativan 2mg po TID to 1.5mg po TID; IM Ativan as needed when patient is refusing medication as allowable by TOO  - Continue Prozac 10mg po daily (switch from tablets to solutions)  - Continue Invega 3mg po per TOO; if the patient rejects the medication, please give Haldol 5mg IM  - Albuterol inh available d/t hx of asthma  - Plan for family meeting when patient's mother is able to encourage medication adherence  - Encourage the patient to engage in therapeutic activities on the unit  - Treatment over objection

## 2024-04-02 NOTE — BH INPATIENT PSYCHIATRY PROGRESS NOTE - NSBHCHARTREVIEWVS_PSY_A_CORE FT
Vital Signs Last 24 Hrs  T(C): --  T(F): --  HR: 104 (04-01-24 @ 17:28) (104 - 104)  BP: 111/74 (04-01-24 @ 17:28) (111/74 - 111/74)  BP(mean): --  RR: --  SpO2: 98% (04-01-24 @ 17:28) (98% - 98%)     Vital Signs Last 24 Hrs  T(C): --  T(F): --  HR: --  BP: --  BP(mean): --  RR: --  SpO2: --

## 2024-04-02 NOTE — BH INPATIENT PSYCHIATRY PROGRESS NOTE - NSBHFUPINTERVALHXFT_PSY_A_CORE
Patient seen in her room, on approach is laying in bed with the sheets over her head.  Per report, the patient is receiving po more often than not now, though two nights ago she required IM.  Per staff report pt is unkempt, selectively mute, isolative to room.  Today, the patient reports she is "fine" while making fleeting eye contact, but then does not engage in interview.  When asked about pain or feeling unwell, the patient does not make eye contact or answer.  Multiple attempts to re-engage the patient do not work.  Patient encouraged to continue with po medications and therapeutic activities.

## 2024-04-02 NOTE — BH INPATIENT PSYCHIATRY PROGRESS NOTE - MSE UNSTRUCTURED FT
Appearance: appears stated age, mildly disheveled  Behavior: laying in bed, poor eye contact, uncooperative  Psychomotor: +PMR  Speech: unable to assess due ot limited engagement  Mood: "fine"  Affect: mildly dysphoric; stable; mood incongruent  Thought process: poverty of speech; unable to assess  Thought content: unable to assess  Perception: unable to assess  Memory: unable to assess  Concentration: unable to assess  Insight: poor  Judgement: poor  Impulse Control: appropriate to setting

## 2024-04-03 RX ADMIN — Medication 1.5 MILLIGRAM(S): at 11:35

## 2024-04-03 RX ADMIN — Medication 1.5 MILLIGRAM(S): at 15:20

## 2024-04-03 RX ADMIN — HALOPERIDOL DECANOATE 5 MILLIGRAM(S): 100 INJECTION INTRAMUSCULAR at 22:12

## 2024-04-03 RX ADMIN — Medication 2 MILLIGRAM(S): at 22:11

## 2024-04-03 NOTE — BH INPATIENT PSYCHIATRY PROGRESS NOTE - NSBHFUPINTERVALHXFT_PSY_A_CORE
Patient seen in her room sitting at her desk and slowly eating a cup of pretzels.  At the end of the interview, the patient goes to walk to her bed and is very slow with a small stride.  She is noticeably slower to move and respond compared to when she was on a higher dose of Ativan.  The patient reports mood as "sad" because she is thinking about how she misses her mother.  Says she cannot remember why she was sad before admission.  She denies SI, HI, and AVH.  She reports more dizziness with medications despite being on a higher dose prior.  She multiple times asks if she needs to take medications and if there is an Ativan alternative.

## 2024-04-03 NOTE — BH INPATIENT PSYCHIATRY PROGRESS NOTE - NSBHASSESSSUMMFT_PSY_ALL_CORE
Ms. 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    The patient's presentation is most consistent with recurrent MDD with catatonic features (severe) in the context of medication non-adherence.  The patient reports depressed mood, decrease in appetite, change in sleep (increased), psychomotor retardation, somatic symptoms, decreased interest in activities and concentration.  Moreover, patient has catatonic features of staring/poor eye contact, poverty of speech, decrease mobility, and withdrawal.  Given history or MDD w/ catatonic features, suspect the patient has decompensated and would respond well to Ativan as she did during her last [facility] admission.  Maternal engagement resulted in temporary better medication adherence so will involve the mother closely in case.  Although depressed mood, patient is low SA risk given no prior history and no current thoughts of SI.  However, will continue to assess for degree of depression and SI once the patient's catatonia is addressed and improved.  The patient necessitates inpatient psychiatric admission and continued care due to danger to self as she is unable to care for basic needs in her current state.    3/29 - The patient's catatonia is improving with administration of Ativan TID.  Notably, she does not seem to understand the concept of why she is getting IM and that oral medication would avoid that route.  She still has PMR and similar complaints about dizziness.  Patient remains dysphoric appearing, so in addition to Ativan SSRI will be continued to be offered despite patient refusal.  Patient is improved but still possibly showing negative psychotic features so will start Invega and assess for effectiveness.    4/2 - The patient is less engaged and will not explain why.  Although less engaged she did meaningfully look at this writer and report feeling fine.  Suspect patient non-engagement is more volitional than psychotic or catatonic in nature.  Given patient has been taking Ativan 2mg TID with good effect but the patient has been reporting "dizziness" days prior, will try to decrease patient Ativan and see how she responds.    4/3 - Patient is noticeably slower and appears more catatonic since her Ativan dose was reduced.  Will increase the patient back to 2mg TID and consider if increasing further is appropriate.    Plan:  - Increase Ativan 1.5mg po TID to 2.0mg po TID; IM Ativan as needed when patient is refusing medication as allowable by TOO  - Continue Prozac 10mg po daily (switch from tablets to solutions)  - Continue Invega 3mg po per TOO; if the patient rejects the medication, please give Haldol 5mg IM  - Albuterol inh available d/t hx of asthma  - Plan for family meeting when patient's mother is able to encourage medication adherence  - Encourage the patient to engage in therapeutic activities on the unit  - Treatment over objection

## 2024-04-03 NOTE — BH INPATIENT PSYCHIATRY PROGRESS NOTE - NSBHMETABOLIC_PSY_ALL_CORE_FT
BMI: BMI (kg/m2): 27.3 (03-04-24 @ 21:11)  HbA1c:   Glucose:   BP: 122/85 (04-03-24 @ 17:22) (102/69 - 122/85)Vital Signs Last 24 Hrs  T(C): 37.6 (04-03-24 @ 17:22), Max: 37.6 (04-03-24 @ 17:22)  T(F): 99.6 (04-03-24 @ 17:22), Max: 99.6 (04-03-24 @ 17:22)  HR: 115 (04-03-24 @ 17:22) (101 - 115)  BP: 122/85 (04-03-24 @ 17:22) (103/67 - 122/85)  BP(mean): --  RR: 18 (04-03-24 @ 09:20) (18 - 18)  SpO2: 99% (04-03-24 @ 17:22) (97% - 99%)      Lipid Panel:

## 2024-04-03 NOTE — BH INPATIENT PSYCHIATRY PROGRESS NOTE - NSBHCHARTREVIEWVS_PSY_A_CORE FT
Vital Signs Last 24 Hrs  T(C): 37.6 (04-03-24 @ 17:22), Max: 37.6 (04-03-24 @ 17:22)  T(F): 99.6 (04-03-24 @ 17:22), Max: 99.6 (04-03-24 @ 17:22)  HR: 115 (04-03-24 @ 17:22) (101 - 115)  BP: 122/85 (04-03-24 @ 17:22) (103/67 - 122/85)  BP(mean): --  RR: 18 (04-03-24 @ 09:20) (18 - 18)  SpO2: 99% (04-03-24 @ 17:22) (97% - 99%)

## 2024-04-03 NOTE — BH INPATIENT PSYCHIATRY PROGRESS NOTE - CURRENT MEDICATION
MEDICATIONS  (STANDING):  FLUoxetine Solution 20 milliGRAM(s) Oral daily  haloperidol    Injectable 5 milliGRAM(s) IntraMuscular at bedtime  LORazepam     Tablet 2 milliGRAM(s) Oral three times a day  paliperidone ER 3 milliGRAM(s) Oral at bedtime    MEDICATIONS  (PRN):  albuterol    90 MICROgram(s) HFA Inhaler 2 Puff(s) Inhalation every 6 hours PRN Shortness of Breath and/or Wheezing  aluminum hydroxide/magnesium hydroxide/simethicone Suspension 30 milliLiter(s) Oral every 6 hours PRN Dyspepsia  LORazepam   Injectable 2 milliGRAM(s) IntraMuscular every 8 hours PRN catatonia  meclizine 12.5 milliGRAM(s) Oral every 6 hours PRN dizziness  ondansetron   Disintegrating Tablet 4 milliGRAM(s) Oral every 6 hours PRN Nausea and/or Vomiting

## 2024-04-03 NOTE — BH INPATIENT PSYCHIATRY PROGRESS NOTE - MSE UNSTRUCTURED FT
Appearance: appears stated age, mildly disheveled  Behavior: sitting at desk, poor eye contact, partially cooperative, minimal variance in facial expression  Psychomotor: +PMR  Speech: unable to assess due ot limited engagement  Mood: "sad"  Affect: dysphoric; stable; mood congruent  Thought process: poverty of speech; illogical; perseverative  Thought content: misses her mother, not wanting to take medications; denies SI/HI  Perception: denies AVH  Memory: impaired, based on patient inability to remember why she felt sad before admission  Concentration: fair based on patient sometimes needs questions repeated  Insight: poor  Judgement: poor  Impulse Control: appropriate to setting

## 2024-04-03 NOTE — BH INPATIENT PSYCHIATRY PROGRESS NOTE - PRN MEDS
MEDICATIONS  (PRN):  albuterol    90 MICROgram(s) HFA Inhaler 2 Puff(s) Inhalation every 6 hours PRN Shortness of Breath and/or Wheezing  aluminum hydroxide/magnesium hydroxide/simethicone Suspension 30 milliLiter(s) Oral every 6 hours PRN Dyspepsia  LORazepam   Injectable 2 milliGRAM(s) IntraMuscular every 8 hours PRN catatonia  meclizine 12.5 milliGRAM(s) Oral every 6 hours PRN dizziness  ondansetron   Disintegrating Tablet 4 milliGRAM(s) Oral every 6 hours PRN Nausea and/or Vomiting

## 2024-04-04 PROCEDURE — 99232 SBSQ HOSP IP/OBS MODERATE 35: CPT

## 2024-04-04 RX ORDER — MECLIZINE HCL 12.5 MG
12.5 TABLET ORAL EVERY 6 HOURS
Refills: 0 | Status: DISCONTINUED | OUTPATIENT
Start: 2024-04-04 | End: 2024-04-11

## 2024-04-04 RX ORDER — ALBUTEROL 90 UG/1
2 AEROSOL, METERED ORAL EVERY 6 HOURS
Refills: 0 | Status: DISCONTINUED | OUTPATIENT
Start: 2024-04-04 | End: 2024-04-11

## 2024-04-04 RX ORDER — ONDANSETRON 8 MG/1
4 TABLET, FILM COATED ORAL EVERY 6 HOURS
Refills: 0 | Status: DISCONTINUED | OUTPATIENT
Start: 2024-04-04 | End: 2024-04-11

## 2024-04-04 RX ADMIN — PALIPERIDONE 3 MILLIGRAM(S): 1.5 TABLET, EXTENDED RELEASE ORAL at 22:15

## 2024-04-04 RX ADMIN — Medication 2 MILLIGRAM(S): at 16:02

## 2024-04-04 RX ADMIN — Medication 2 MILLIGRAM(S): at 22:15

## 2024-04-04 RX ADMIN — Medication 2 MILLIGRAM(S): at 11:05

## 2024-04-04 NOTE — BH INPATIENT PSYCHIATRY PROGRESS NOTE - NSBHFUPINTERVALHXFT_PSY_A_CORE
lying in bed pretending to be asleep but actually showing evidence that she is awake but mute and not wanting to speak to the writer;  may be cheeking some of her meds;  little improvement from previous encounters.  continue current medication efforts may need loing term hospitaizalizaiton.

## 2024-04-04 NOTE — BH INPATIENT PSYCHIATRY PROGRESS NOTE - NSBHCHARTREVIEWVS_PSY_A_CORE FT
Vital Signs Last 24 Hrs  T(C): 37.3 (04-03-24 @ 22:09), Max: 37.3 (04-03-24 @ 22:09)  T(F): 99.1 (04-03-24 @ 22:09), Max: 99.1 (04-03-24 @ 22:09)  HR: 115 (04-04-24 @ 09:05) (99 - 115)  BP: 108/77 (04-04-24 @ 18:46) (108/77 - 118/79)  BP(mean): --  RR: 18 (04-04-24 @ 09:05) (17 - 18)  SpO2: 100% (04-04-24 @ 18:46) (98% - 100%)

## 2024-04-04 NOTE — BH INPATIENT PSYCHIATRY PROGRESS NOTE - NSBHMETABOLIC_PSY_ALL_CORE_FT
BMI: BMI (kg/m2): 27.3 (03-04-24 @ 21:11)  HbA1c:   Glucose:   BP: 108/77 (04-04-24 @ 18:46) (103/67 - 122/85)Vital Signs Last 24 Hrs  T(C): 37.3 (04-03-24 @ 22:09), Max: 37.3 (04-03-24 @ 22:09)  T(F): 99.1 (04-03-24 @ 22:09), Max: 99.1 (04-03-24 @ 22:09)  HR: 115 (04-04-24 @ 09:05) (99 - 115)  BP: 108/77 (04-04-24 @ 18:46) (108/77 - 118/79)  BP(mean): --  RR: 18 (04-04-24 @ 09:05) (17 - 18)  SpO2: 100% (04-04-24 @ 18:46) (98% - 100%)      Lipid Panel:

## 2024-04-04 NOTE — BH INPATIENT PSYCHIATRY PROGRESS NOTE - NSBHASSESSSUMMFT_PSY_ALL_CORE
Ms. 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    The patient's presentation is most consistent with recurrent MDD with catatonic features (severe) in the context of medication non-adherence.  The patient reports depressed mood, decrease in appetite, change in sleep (increased), psychomotor retardation, somatic symptoms, decreased interest in activities and concentration.  Moreover, patient has catatonic features of staring/poor eye contact, poverty of speech, decrease mobility, and withdrawal.  Given history or MDD w/ catatonic features, suspect the patient has decompensated and would respond well to Ativan as she did during her last [facility] admission.  Maternal engagement resulted in temporary better medication adherence so will involve the mother closely in case.  Although depressed mood, patient is low SA risk given no prior history and no current thoughts of SI.  However, will continue to assess for degree of depression and SI once the patient's catatonia is addressed and improved.  The patient necessitates inpatient psychiatric admission and continued care due to danger to self as she is unable to care for basic needs in her current state.    3/29 - The patient's catatonia is improving with administration of Ativan TID.  Notably, she does not seem to understand the concept of why she is getting IM and that oral medication would avoid that route.  She still has PMR and similar complaints about dizziness.  Patient remains dysphoric appearing, so in addition to Ativan SSRI will be continued to be offered despite patient refusal.  Patient is improved but still possibly showing negative psychotic features so will start Invega and assess for effectiveness.    4/2 - The patient is less engaged and will not explain why.  Although less engaged she did meaningfully look at this writer and report feeling fine.  Suspect patient non-engagement is more volitional than psychotic or catatonic in nature.  Given patient has been taking Ativan 2mg TID with good effect but the patient has been reporting "dizziness" days prior, will try to decrease patient Ativan and see how she responds.    4/3 - Patient is noticeably slower and appears more catatonic since her Ativan dose was reduced.  Will increase the patient back to 2mg TID and consider if increasing further is appropriate.    Plan:  - Increase Ativan 1.5mg po TID to 2.0mg po TID; IM Ativan as needed when patient is refusing medication as allowable by TOO  - Continue Prozac 10mg po daily (switch from tablets to solutions)  - Continue Invega 3mg po per TOO; if the patient rejects the medication, please give Haldol 5mg IM  - Albuterol inh available d/t hx of asthma  - Plan for family meeting when patient's mother is able to encourage medication adherence  - Encourage the patient to engage in therapeutic activities on the unit  - Treatment over objection

## 2024-04-05 PROCEDURE — 99232 SBSQ HOSP IP/OBS MODERATE 35: CPT

## 2024-04-05 RX ADMIN — Medication 2 MILLIGRAM(S): at 21:20

## 2024-04-05 RX ADMIN — Medication 2 MILLIGRAM(S): at 15:24

## 2024-04-05 RX ADMIN — PALIPERIDONE 3 MILLIGRAM(S): 1.5 TABLET, EXTENDED RELEASE ORAL at 21:21

## 2024-04-05 RX ADMIN — Medication 2 MILLIGRAM(S): at 11:32

## 2024-04-05 NOTE — BH INPATIENT PSYCHIATRY PROGRESS NOTE - NSBHFUPINTERVALHXFT_PSY_A_CORE
Pt more sedated today but seemed not to do well with decreased dose of ativan/ Encouraging pt to take prozac. Will arrange family meeting with mother to see if we can improve adherence. Considering ACT and AOT.

## 2024-04-05 NOTE — BH INPATIENT PSYCHIATRY PROGRESS NOTE - NSBHCHARTREVIEWVS_PSY_A_CORE FT
Vital Signs Last 24 Hrs  T(C): --  T(F): --  HR: 110 (04-05-24 @ 08:55) (110 - 110)  BP: 88/59 (04-05-24 @ 08:55) (88/59 - 108/77)  BP(mean): --  RR: --  SpO2: 97% (04-05-24 @ 08:55) (97% - 100%)

## 2024-04-05 NOTE — BH INPATIENT PSYCHIATRY PROGRESS NOTE - NSBHMETABOLIC_PSY_ALL_CORE_FT
BMI: BMI (kg/m2): 27.3 (03-04-24 @ 21:11)  HbA1c:   Glucose:   BP: 88/59 (04-05-24 @ 08:55) (88/59 - 122/85)Vital Signs Last 24 Hrs  T(C): --  T(F): --  HR: 110 (04-05-24 @ 08:55) (110 - 110)  BP: 88/59 (04-05-24 @ 08:55) (88/59 - 108/77)  BP(mean): --  RR: --  SpO2: 97% (04-05-24 @ 08:55) (97% - 100%)      Lipid Panel:

## 2024-04-05 NOTE — BH INPATIENT PSYCHIATRY PROGRESS NOTE - NSBHASSESSSUMMFT_PSY_ALL_CORE
Ms. 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 (7/17/22-8/22/22 discharged on Ativan for catatonia and Prozac for depressed mood) who presented brought in by mother to [facility] before transfer to [facility] for tearfulness, decreased activity, and decreased appetite.    The patient's presentation is most consistent with recurrent MDD with catatonic features (severe) in the context of medication non-adherence.  The patient reports depressed mood, decrease in appetite, change in sleep (increased), psychomotor retardation, somatic symptoms, decreased interest in activities and concentration.  Moreover, patient has catatonic features of staring/poor eye contact, poverty of speech, decrease mobility, and withdrawal.  Given history or MDD w/ catatonic features, suspect the patient has decompensated and would respond well to Ativan as she did during her last [facility] admission.  Maternal engagement resulted in temporary better medication adherence so will involve the mother closely in case.  Although depressed mood, patient is low SA risk given no prior history and no current thoughts of SI.  However, will continue to assess for degree of depression and SI once the patient's catatonia is addressed and improved.  The patient necessitates inpatient psychiatric admission and continued care due to danger to self as she is unable to care for basic needs in her current state.    3/29 - The patient's catatonia is improving with administration of Ativan TID.  Notably, she does not seem to understand the concept of why she is getting IM and that oral medication would avoid that route.  She still has PMR and similar complaints about dizziness.  Patient remains dysphoric appearing, so in addition to Ativan SSRI will be continued to be offered despite patient refusal.  Patient is improved but still possibly showing negative psychotic features so will start Invega and assess for effectiveness.    4/2 - The patient is less engaged and will not explain why.  Although less engaged she did meaningfully look at this writer and report feeling fine.  Suspect patient non-engagement is more volitional than psychotic or catatonic in nature.  Given patient has been taking Ativan 2mg TID with good effect but the patient has been reporting "dizziness" days prior, will try to decrease patient Ativan and see how she responds.    4/3 - Patient is noticeably slower and appears more catatonic since her Ativan dose was reduced.  Will increase the patient back to 2mg TID and consider if increasing further is appropriate.    Plan:  - Increase Ativan 1.5mg po TID to 2.0mg po TID; IM Ativan as needed when patient is refusing medication as allowable by TOO  - Continue Prozac 10mg po daily (switch from tablets to solutions)  - Continue Invega 3mg po per TOO; if the patient rejects the medication, please give Haldol 5mg IM  - Albuterol inh available d/t hx of asthma  - Plan for family meeting when patient's mother is able to encourage medication adherence  - Encourage the patient to engage in therapeutic activities on the unit  - Treatment over objection

## 2024-04-06 RX ADMIN — Medication 2 MILLIGRAM(S): at 15:03

## 2024-04-06 RX ADMIN — Medication 20 MILLIGRAM(S): at 11:37

## 2024-04-06 RX ADMIN — PALIPERIDONE 3 MILLIGRAM(S): 1.5 TABLET, EXTENDED RELEASE ORAL at 21:26

## 2024-04-06 RX ADMIN — Medication 2 MILLIGRAM(S): at 21:26

## 2024-04-06 RX ADMIN — Medication 2 MILLIGRAM(S): at 11:36

## 2024-04-07 RX ADMIN — Medication 2 MILLIGRAM(S): at 21:08

## 2024-04-07 RX ADMIN — Medication 20 MILLIGRAM(S): at 14:36

## 2024-04-07 RX ADMIN — PALIPERIDONE 3 MILLIGRAM(S): 1.5 TABLET, EXTENDED RELEASE ORAL at 21:08

## 2024-04-07 RX ADMIN — Medication 2 MILLIGRAM(S): at 14:36

## 2024-04-07 RX ADMIN — Medication 2 MILLIGRAM(S): at 10:19

## 2024-04-08 PROCEDURE — 99233 SBSQ HOSP IP/OBS HIGH 50: CPT

## 2024-04-08 RX ORDER — LANOLIN ALCOHOL/MO/W.PET/CERES
3 CREAM (GRAM) TOPICAL AT BEDTIME
Refills: 0 | Status: DISCONTINUED | OUTPATIENT
Start: 2024-04-08 | End: 2024-04-11

## 2024-04-08 RX ADMIN — PALIPERIDONE 3 MILLIGRAM(S): 1.5 TABLET, EXTENDED RELEASE ORAL at 21:41

## 2024-04-08 RX ADMIN — Medication 2 MILLIGRAM(S): at 12:59

## 2024-04-08 RX ADMIN — Medication 3 MILLIGRAM(S): at 21:56

## 2024-04-08 RX ADMIN — Medication 20 MILLIGRAM(S): at 10:14

## 2024-04-08 RX ADMIN — Medication 2 MILLIGRAM(S): at 10:13

## 2024-04-08 RX ADMIN — Medication 2 MILLIGRAM(S): at 21:42

## 2024-04-08 NOTE — BH INPATIENT PSYCHIATRY PROGRESS NOTE - NSBHCHARTREVIEWVS_PSY_A_CORE FT
Vital Signs Last 24 Hrs  T(C): 37.1 (04-08-24 @ 09:02), Max: 37.1 (04-08-24 @ 09:02)  T(F): 98.8 (04-08-24 @ 09:02), Max: 98.8 (04-08-24 @ 09:02)  HR: 97 (04-08-24 @ 09:02) (86 - 100)  BP: 111/75 (04-08-24 @ 09:02) (111/75 - 122/81)  BP(mean): --  RR: 18 (04-07-24 @ 17:08) (18 - 18)  SpO2: 100% (04-08-24 @ 09:02) (100% - 100%)     Vital Signs Last 24 Hrs  T(C): 36.7 (04-09-24 @ 08:07), Max: 37.3 (04-08-24 @ 17:32)  T(F): 98 (04-09-24 @ 08:07), Max: 99.2 (04-08-24 @ 17:32)  HR: 82 (04-09-24 @ 08:07) (82 - 95)  BP: 108/73 (04-09-24 @ 08:07) (108/73 - 125/82)  BP(mean): --  RR: 18 (04-09-24 @ 08:07) (18 - 18)  SpO2: 100% (04-09-24 @ 08:07) (100% - 100%)

## 2024-04-08 NOTE — BH INPATIENT PSYCHIATRY PROGRESS NOTE - NSBHFUPINTERVALHXFT_PSY_A_CORE
Pt more sedated today but seemed not to do well with decreased dose of ativan/ Encouraging pt to take prozac. Will arrange family meeting with mother to see if we can improve adherence. Considering ACT and AOT.  Pt dramatically better. Had family meeting with mother and mother feels she is back to herself. Pt was able to fully participate as she is having spontaneous conversations. She is able to acknowledge medication has helped her.  Pt does not want RUANO but willing to sign that she agrees to ACT Team. AOT explained to pt during family meeting as something that would be pursued if she were readmitted to us. Pt for discharge on Thursday. Attending groups. Interacting with peers.

## 2024-04-08 NOTE — BH INPATIENT PSYCHIATRY PROGRESS NOTE - NSBHASSESSSUMMFT_PSY_ALL_CORE
Ms. 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    The patient's presentation is most consistent with recurrent MDD with catatonic features (severe) in the context of medication non-adherence.  The patient reports depressed mood, decrease in appetite, change in sleep (increased), psychomotor retardation, somatic symptoms, decreased interest in activities and concentration.  Moreover, patient has catatonic features of staring/poor eye contact, poverty of speech, decrease mobility, and withdrawal.  Given history or MDD w/ catatonic features, suspect the patient has decompensated and would respond well to Ativan as she did during her last [facility] admission.  Maternal engagement resulted in temporary better medication adherence so will involve the mother closely in case.  Although depressed mood, patient is low SA risk given no prior history and no current thoughts of SI.  However, will continue to assess for degree of depression and SI once the patient's catatonia is addressed and improved.  The patient necessitates inpatient psychiatric admission and continued care due to danger to self as she is unable to care for basic needs in her current state.    3/29 - The patient's catatonia is improving with administration of Ativan TID.  Notably, she does not seem to understand the concept of why she is getting IM and that oral medication would avoid that route.  She still has PMR and similar complaints about dizziness.  Patient remains dysphoric appearing, so in addition to Ativan SSRI will be continued to be offered despite patient refusal.  Patient is improved but still possibly showing negative psychotic features so will start Invega and assess for effectiveness.    4/2 - The patient is less engaged and will not explain why.  Although less engaged she did meaningfully look at this writer and report feeling fine.  Suspect patient non-engagement is more volitional than psychotic or catatonic in nature.  Given patient has been taking Ativan 2mg TID with good effect but the patient has been reporting "dizziness" days prior, will try to decrease patient Ativan and see how she responds.    4/3 - Patient is noticeably slower and appears more catatonic since her Ativan dose was reduced.  Will increase the patient back to 2mg TID and consider if increasing further is appropriate.    Plan:  - Increase Ativan 1.5mg po TID to 2.0mg po TID; IM Ativan as needed when patient is refusing medication as allowable by TOO  - Continue Prozac 10mg po daily (switch from tablets to solutions)  - Continue Invega 3mg po per TOO; if the patient rejects the medication, please give Haldol 5mg IM  - Albuterol inh available d/t hx of asthma  - Plan for family meeting when patient's mother is able to encourage medication adherence  - Encourage the patient to engage in therapeutic activities on the unit  - Treatment over objection

## 2024-04-08 NOTE — BH INPATIENT PSYCHIATRY PROGRESS NOTE - PRN MEDS
MEDICATIONS  (PRN):  albuterol    90 MICROgram(s) HFA Inhaler 2 Puff(s) Inhalation every 6 hours PRN Shortness of Breath and/or Wheezing  aluminum hydroxide/magnesium hydroxide/simethicone Suspension 30 milliLiter(s) Oral every 6 hours PRN Dyspepsia  meclizine 12.5 milliGRAM(s) Oral every 6 hours PRN dizziness  ondansetron   Disintegrating Tablet 4 milliGRAM(s) Oral every 6 hours PRN Nausea and/or Vomiting   MEDICATIONS  (PRN):  albuterol    90 MICROgram(s) HFA Inhaler 2 Puff(s) Inhalation every 6 hours PRN Shortness of Breath and/or Wheezing  aluminum hydroxide/magnesium hydroxide/simethicone Suspension 30 milliLiter(s) Oral every 6 hours PRN Dyspepsia  meclizine 12.5 milliGRAM(s) Oral every 6 hours PRN Dizziness  melatonin. 3 milliGRAM(s) Oral at bedtime PRN Insomnia  ondansetron   Disintegrating Tablet 4 milliGRAM(s) Oral every 6 hours PRN Nausea and/or Vomiting

## 2024-04-08 NOTE — BH INPATIENT PSYCHIATRY PROGRESS NOTE - CURRENT MEDICATION
MEDICATIONS  (STANDING):  FLUoxetine Solution 20 milliGRAM(s) Oral daily  haloperidol    Injectable 5 milliGRAM(s) IntraMuscular at bedtime  LORazepam     Tablet 2 milliGRAM(s) Oral three times a day  LORazepam   Injectable 2 milliGRAM(s) IntraMuscular three times a day  paliperidone ER 3 milliGRAM(s) Oral at bedtime    MEDICATIONS  (PRN):  albuterol    90 MICROgram(s) HFA Inhaler 2 Puff(s) Inhalation every 6 hours PRN Shortness of Breath and/or Wheezing  aluminum hydroxide/magnesium hydroxide/simethicone Suspension 30 milliLiter(s) Oral every 6 hours PRN Dyspepsia  meclizine 12.5 milliGRAM(s) Oral every 6 hours PRN dizziness  ondansetron   Disintegrating Tablet 4 milliGRAM(s) Oral every 6 hours PRN Nausea and/or Vomiting   MEDICATIONS  (STANDING):  FLUoxetine Solution 20 milliGRAM(s) Oral daily  haloperidol    Injectable 5 milliGRAM(s) IntraMuscular at bedtime  LORazepam     Tablet 2 milliGRAM(s) Oral three times a day  LORazepam   Injectable 2 milliGRAM(s) IntraMuscular three times a day  paliperidone ER 3 milliGRAM(s) Oral at bedtime    MEDICATIONS  (PRN):  albuterol    90 MICROgram(s) HFA Inhaler 2 Puff(s) Inhalation every 6 hours PRN Shortness of Breath and/or Wheezing  aluminum hydroxide/magnesium hydroxide/simethicone Suspension 30 milliLiter(s) Oral every 6 hours PRN Dyspepsia  meclizine 12.5 milliGRAM(s) Oral every 6 hours PRN Dizziness  melatonin. 3 milliGRAM(s) Oral at bedtime PRN Insomnia  ondansetron   Disintegrating Tablet 4 milliGRAM(s) Oral every 6 hours PRN Nausea and/or Vomiting

## 2024-04-08 NOTE — BH INPATIENT PSYCHIATRY PROGRESS NOTE - NSBHMETABOLIC_PSY_ALL_CORE_FT
BMI: BMI (kg/m2): 27.3 (03-04-24 @ 21:11)  HbA1c:   Glucose:   BP: 111/75 (04-08-24 @ 09:02) (98/66 - 122/81)Vital Signs Last 24 Hrs  T(C): 37.1 (04-08-24 @ 09:02), Max: 37.1 (04-08-24 @ 09:02)  T(F): 98.8 (04-08-24 @ 09:02), Max: 98.8 (04-08-24 @ 09:02)  HR: 97 (04-08-24 @ 09:02) (86 - 100)  BP: 111/75 (04-08-24 @ 09:02) (111/75 - 122/81)  BP(mean): --  RR: 18 (04-07-24 @ 17:08) (18 - 18)  SpO2: 100% (04-08-24 @ 09:02) (100% - 100%)      Lipid Panel:  BMI: BMI (kg/m2): 27.3 (03-04-24 @ 21:11)  HbA1c:   Glucose:   BP: 108/73 (04-09-24 @ 08:07) (98/66 - 125/82)Vital Signs Last 24 Hrs  T(C): 36.7 (04-09-24 @ 08:07), Max: 37.3 (04-08-24 @ 17:32)  T(F): 98 (04-09-24 @ 08:07), Max: 99.2 (04-08-24 @ 17:32)  HR: 82 (04-09-24 @ 08:07) (82 - 95)  BP: 108/73 (04-09-24 @ 08:07) (108/73 - 125/82)  BP(mean): --  RR: 18 (04-09-24 @ 08:07) (18 - 18)  SpO2: 100% (04-09-24 @ 08:07) (100% - 100%)      Lipid Panel:

## 2024-04-09 PROCEDURE — 99232 SBSQ HOSP IP/OBS MODERATE 35: CPT

## 2024-04-09 RX ADMIN — Medication 2 MILLIGRAM(S): at 21:25

## 2024-04-09 RX ADMIN — Medication 2 MILLIGRAM(S): at 14:20

## 2024-04-09 RX ADMIN — Medication 3 MILLIGRAM(S): at 21:24

## 2024-04-09 RX ADMIN — PALIPERIDONE 3 MILLIGRAM(S): 1.5 TABLET, EXTENDED RELEASE ORAL at 21:24

## 2024-04-09 RX ADMIN — Medication 20 MILLIGRAM(S): at 10:30

## 2024-04-09 RX ADMIN — Medication 2 MILLIGRAM(S): at 10:30

## 2024-04-09 NOTE — BH DISCHARGE NOTE NURSING/SOCIAL WORK/PSYCH REHAB - NSCDUDCCRISIS_PSY_A_CORE
[facility]  1 [phone]) [facility] ([phone])  Text "WELL" to [phone]  Website: [email or web address] Suicide Prevention Lifeline 2 [phone]) 403-1123/988 Suicide and Crisis Lifeline

## 2024-04-09 NOTE — BH INPATIENT PSYCHIATRY PROGRESS NOTE - NSBHASSESSSUMMFT_PSY_ALL_CORE
Ms. 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    The patient's presentation is most consistent with recurrent MDD with catatonic features (severe) in the context of medication non-adherence.  The patient reports depressed mood, decrease in appetite, change in sleep (increased), psychomotor retardation, somatic symptoms, decreased interest in activities and concentration.  Moreover, patient has catatonic features of staring/poor eye contact, poverty of speech, decrease mobility, and withdrawal.  Given history or MDD w/ catatonic features, suspect the patient has decompensated and would respond well to Ativan as she did during her last [facility] admission.  Maternal engagement resulted in temporary better medication adherence so will involve the mother closely in case.  Although depressed mood, patient is low SA risk given no prior history and no current thoughts of SI.  However, will continue to assess for degree of depression and SI once the patient's catatonia is addressed and improved.  The patient necessitates inpatient psychiatric admission and continued care due to danger to self as she is unable to care for basic needs in her current state.    3/29 - The patient's catatonia is improving with administration of Ativan TID.  Notably, she does not seem to understand the concept of why she is getting IM and that oral medication would avoid that route.  She still has PMR and similar complaints about dizziness.  Patient remains dysphoric appearing, so in addition to Ativan SSRI will be continued to be offered despite patient refusal.  Patient is improved but still possibly showing negative psychotic features so will start Invega and assess for effectiveness.    4/2 - The patient is less engaged and will not explain why.  Although less engaged she did meaningfully look at this writer and report feeling fine.  Suspect patient non-engagement is more volitional than psychotic or catatonic in nature.  Given patient has been taking Ativan 2mg TID with good effect but the patient has been reporting "dizziness" days prior, will try to decrease patient Ativan and see how she responds.    4/3 - Patient is noticeably slower and appears more catatonic since her Ativan dose was reduced.  Will increase the patient back to 2mg TID and consider if increasing further is appropriate.    Plan:  - Increase Ativan 1.5mg po TID to 2.0mg po TID; IM Ativan as needed when patient is refusing medication as allowable by TOO  - Continue Prozac 10mg po daily (switch from tablets to solutions)  - Continue Invega 3mg po per TOO; if the patient rejects the medication, please give Haldol 5mg IM  - Albuterol inh available d/t hx of asthma  - Plan for family meeting when patient's mother is able to encourage medication adherence  - Encourage the patient to engage in therapeutic activities on the unit  - Treatment over objection

## 2024-04-09 NOTE — BH INPATIENT PSYCHIATRY PROGRESS NOTE - NSBHCHARTREVIEWVS_PSY_A_CORE FT
Vital Signs Last 24 Hrs  T(C): 36.7 (04-09-24 @ 08:07), Max: 37.3 (04-08-24 @ 17:32)  T(F): 98 (04-09-24 @ 08:07), Max: 99.2 (04-08-24 @ 17:32)  HR: 82 (04-09-24 @ 08:07) (82 - 95)  BP: 108/73 (04-09-24 @ 08:07) (108/73 - 125/82)  BP(mean): --  RR: 18 (04-09-24 @ 08:07) (18 - 18)  SpO2: 100% (04-09-24 @ 08:07) (100% - 100%)

## 2024-04-09 NOTE — BH INPATIENT PSYCHIATRY PROGRESS NOTE - NSBHMETABOLIC_PSY_ALL_CORE_FT
BMI: BMI (kg/m2): 27.3 (03-04-24 @ 21:11)  HbA1c:   Glucose:   BP: 108/73 (04-09-24 @ 08:07) (98/66 - 125/82)Vital Signs Last 24 Hrs  T(C): 36.7 (04-09-24 @ 08:07), Max: 37.3 (04-08-24 @ 17:32)  T(F): 98 (04-09-24 @ 08:07), Max: 99.2 (04-08-24 @ 17:32)  HR: 82 (04-09-24 @ 08:07) (82 - 95)  BP: 108/73 (04-09-24 @ 08:07) (108/73 - 125/82)  BP(mean): --  RR: 18 (04-09-24 @ 08:07) (18 - 18)  SpO2: 100% (04-09-24 @ 08:07) (100% - 100%)      Lipid Panel:

## 2024-04-09 NOTE — BH DISCHARGE NOTE NURSING/SOCIAL WORK/PSYCH REHAB - NSDCADDINFO1FT_PSY_ALL_CORE
Intake appointment scheduled for Thursday, April 18th at 2pm. This is a VIRTUAL appointment (with option for in person). Please complete intake forms sent to email [email or web address]

## 2024-04-09 NOTE — BH DISCHARGE NOTE NURSING/SOCIAL WORK/PSYCH REHAB - PATIENT PORTAL LINK FT
You can access the FollowMyHealth Patient Portal offered by [facility] by registering at the following website: [email or web address]. By joining [facility]’s FollowMyHealth portal, you will also be able to view your health information using other applications (apps) compatible with our system.

## 2024-04-09 NOTE — BH INPATIENT PSYCHIATRY PROGRESS NOTE - OTHER
- L TE fill: 350 cc - 30 = 320 (400 cc 133SMX)  - discussed lift will change R breast shape/size, however if pt is uncomfortable with L size happy to see again to remove more fluid  - no restrictions remaining  - continue to wear soft bra during the day, ok to remove at night if desired   - place clean gauze in bra as needed for comfort, drainage  - call with any questions or concerns  - follow up in 1 month with Dr. Bray   not done

## 2024-04-09 NOTE — BH SAFETY PLAN - WARNING SIGN 1
Pt. completed a written safety plan and was provided a copy; an additional copy can be found in pt.'s chart on the unit.

## 2024-04-09 NOTE — BH INPATIENT PSYCHIATRY PROGRESS NOTE - PRN MEDS
MEDICATIONS  (PRN):  albuterol    90 MICROgram(s) HFA Inhaler 2 Puff(s) Inhalation every 6 hours PRN Shortness of Breath and/or Wheezing  aluminum hydroxide/magnesium hydroxide/simethicone Suspension 30 milliLiter(s) Oral every 6 hours PRN Dyspepsia  meclizine 12.5 milliGRAM(s) Oral every 6 hours PRN Dizziness  melatonin. 3 milliGRAM(s) Oral at bedtime PRN Insomnia  ondansetron   Disintegrating Tablet 4 milliGRAM(s) Oral every 6 hours PRN Nausea and/or Vomiting

## 2024-04-09 NOTE — BH DISCHARGE NOTE NURSING/SOCIAL WORK/PSYCH REHAB - NSDCPRGOAL_PSY_ALL_CORE
Pt. is future oriented and looking forward to discharge. During safety planning session, pt. shared that she plans to remain compliant with her medications after discharge because she sees how the meds have helped her. When identifying coping skills, pt. spoke about how she uses marijuana to cope with stress; pt. was receptive to exploring coping strategies that don't involve substance use. Pt. was encouraged to speak with her psychiatrist regarding how marijuana use may interfere with her medication regimen.  Pt. is future oriented and looking forward to discharge. During safety planning session, pt. shared that she plans to remain compliant with her medications after discharge because she sees how the meds have helped her. When identifying coping skills, pt. spoke about how she uses marijuana to cope with stress; pt. was receptive to exploring coping strategies that don't involve substance use. Pt. was encouraged to speak with her psychiatrist regarding how marijuana use may interfere with her medication regimen. Pt. endorsed wanting to engage in outpatient therapy.

## 2024-04-09 NOTE — BH INPATIENT PSYCHIATRY PROGRESS NOTE - CURRENT MEDICATION
MEDICATIONS  (STANDING):  FLUoxetine Solution 20 milliGRAM(s) Oral daily  haloperidol    Injectable 5 milliGRAM(s) IntraMuscular at bedtime  LORazepam     Tablet 2 milliGRAM(s) Oral three times a day  LORazepam   Injectable 2 milliGRAM(s) IntraMuscular three times a day  paliperidone ER 3 milliGRAM(s) Oral at bedtime    MEDICATIONS  (PRN):  albuterol    90 MICROgram(s) HFA Inhaler 2 Puff(s) Inhalation every 6 hours PRN Shortness of Breath and/or Wheezing  aluminum hydroxide/magnesium hydroxide/simethicone Suspension 30 milliLiter(s) Oral every 6 hours PRN Dyspepsia  meclizine 12.5 milliGRAM(s) Oral every 6 hours PRN Dizziness  melatonin. 3 milliGRAM(s) Oral at bedtime PRN Insomnia  ondansetron   Disintegrating Tablet 4 milliGRAM(s) Oral every 6 hours PRN Nausea and/or Vomiting

## 2024-04-09 NOTE — BH INPATIENT PSYCHIATRY PROGRESS NOTE - NSBHFUPINTERVALHXFT_PSY_A_CORE
Pt markedly improved. She is having spontaneous conversations. She is able to acknowledge medication has helped her. Team held family meeting with pt and mother yesterday in which she was able to fully participate. Pt does not want RUANO but willing to sign that she agrees to ACT Team. AOT explained to pt during family meeting as something that would be pursued if she were readmitted to us. Pt for discharge on Thursday. Attending groups. Interacting with peers.

## 2024-04-09 NOTE — BH DISCHARGE NOTE NURSING/SOCIAL WORK/PSYCH REHAB - NSDCCRNUMBER_GEN_ALL_CORE_FT
[phone] / [email or web address]

## 2024-04-09 NOTE — BH DISCHARGE NOTE NURSING/SOCIAL WORK/PSYCH REHAB - NSDCCRTYPESERV_GEN_ALL_CORE_FT
Members are engaged in an innovative therapeutic community rooted in Social Practice, and take steps in reclaiming their agency and dignity – with access to clinical support, housing, and care management.

## 2024-04-10 PROCEDURE — 99231 SBSQ HOSP IP/OBS SF/LOW 25: CPT

## 2024-04-10 RX ORDER — ACETAMINOPHEN 500 MG
650 TABLET ORAL EVERY 6 HOURS
Refills: 0 | Status: DISCONTINUED | OUTPATIENT
Start: 2024-04-10 | End: 2024-04-11

## 2024-04-10 RX ADMIN — Medication 2 MILLIGRAM(S): at 14:04

## 2024-04-10 RX ADMIN — PALIPERIDONE 3 MILLIGRAM(S): 1.5 TABLET, EXTENDED RELEASE ORAL at 21:25

## 2024-04-10 RX ADMIN — Medication 20 MILLIGRAM(S): at 09:57

## 2024-04-10 RX ADMIN — Medication 650 MILLIGRAM(S): at 15:41

## 2024-04-10 RX ADMIN — Medication 3 MILLIGRAM(S): at 21:25

## 2024-04-10 RX ADMIN — Medication 2 MILLIGRAM(S): at 09:57

## 2024-04-10 RX ADMIN — Medication 2 MILLIGRAM(S): at 21:25

## 2024-04-10 NOTE — BH INPATIENT PSYCHIATRY PROGRESS NOTE - NSBHFUPINTERVALHXFT_PSY_A_CORE
Pt interacting with peers and staff. Impressively better. For discharge tomorrow. Mother picking her up.

## 2024-04-10 NOTE — BH INPATIENT PSYCHIATRY PROGRESS NOTE - NSCGISEVERILLNESS_PSY_ALL_CORE
7 = Among the most extremely ill patients – pathology drastically interferes in many life functions; may be hospitalized
6 = Severely ill - disruptive pathology, behavior and function are frequently influenced by symptoms, may require assistance from others
7 = Among the most extremely ill patients – pathology drastically interferes in many life functions; may be hospitalized

## 2024-04-10 NOTE — BH INPATIENT PSYCHIATRY PROGRESS NOTE - NSICDXBHPRIMARYDX_PSY_ALL_CORE
MDD (major depressive disorder), recurrent, with catatonic features   F33.9  

## 2024-04-10 NOTE — BH INPATIENT PSYCHIATRY PROGRESS NOTE - NSBHMSETHTCONTENT_PSY_A_CORE
Unremarkable
Unremarkable
Unremarkable/Unable to assess
Unable to assess
Unremarkable
Unremarkable/Unable to assess
Unable to assess
Unremarkable/Unable to assess

## 2024-04-10 NOTE — BH INPATIENT PSYCHIATRY PROGRESS NOTE - NSBHMSEBEHAV_PSY_A_CORE
Cooperative
Cooperative/Uncooperative
Uncooperative
Cooperative
Cooperative/Uncooperative
Cooperative
Uncooperative
Cooperative/Uncooperative

## 2024-04-10 NOTE — BH INPATIENT PSYCHIATRY PROGRESS NOTE - NSBHASSESSSUMMFT_PSY_ALL_CORE
Ms. 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    The patient's presentation is most consistent with recurrent MDD with catatonic features (severe) in the context of medication non-adherence.  The patient reports depressed mood, decrease in appetite, change in sleep (increased), psychomotor retardation, somatic symptoms, decreased interest in activities and concentration.  Moreover, patient has catatonic features of staring/poor eye contact, poverty of speech, decrease mobility, and withdrawal.  Given history or MDD w/ catatonic features, suspect the patient has decompensated and would respond well to Ativan as she did during her last [facility] admission.  Maternal engagement resulted in temporary better medication adherence so will involve the mother closely in case.  Although depressed mood, patient is low SA risk given no prior history and no current thoughts of SI.  However, will continue to assess for degree of depression and SI once the patient's catatonia is addressed and improved.  The patient necessitates inpatient psychiatric admission and continued care due to danger to self as she is unable to care for basic needs in her current state.    3/29 - The patient's catatonia is improving with administration of Ativan TID.  Notably, she does not seem to understand the concept of why she is getting IM and that oral medication would avoid that route.  She still has PMR and similar complaints about dizziness.  Patient remains dysphoric appearing, so in addition to Ativan SSRI will be continued to be offered despite patient refusal.  Patient is improved but still possibly showing negative psychotic features so will start Invega and assess for effectiveness.    4/2 - The patient is less engaged and will not explain why.  Although less engaged she did meaningfully look at this writer and report feeling fine.  Suspect patient non-engagement is more volitional than psychotic or catatonic in nature.  Given patient has been taking Ativan 2mg TID with good effect but the patient has been reporting "dizziness" days prior, will try to decrease patient Ativan and see how she responds.    4/3 - Patient is noticeably slower and appears more catatonic since her Ativan dose was reduced.  Will increase the patient back to 2mg TID and consider if increasing further is appropriate.    Plan:  - Increase Ativan 1.5mg po TID to 2.0mg po TID; IM Ativan as needed when patient is refusing medication as allowable by TOO  - Continue Prozac 10mg po daily (switch from tablets to solutions)  - Continue Invega 3mg po per TOO; if the patient rejects the medication, please give Haldol 5mg IM  - Albuterol inh available d/t hx of asthma  - Plan for family meeting when patient's mother is able to encourage medication adherence  - Encourage the patient to engage in therapeutic activities on the unit  - Treatment over objection

## 2024-04-10 NOTE — BH INPATIENT PSYCHIATRY PROGRESS NOTE - NSTXDEPRESDATEEST_PSY_ALL_CORE
12-Mar-2024
26-Mar-2024
02-Apr-2024
26-Mar-2024
12-Mar-2024
02-Apr-2024
26-Mar-2024
05-Mar-2024
12-Mar-2024
19-Mar-2024
12-Mar-2024
02-Apr-2024
09-Apr-2024
01-Mar-2024
05-Mar-2024
02-Apr-2024
01-Mar-2024
05-Mar-2024
05-Mar-2024
01-Mar-2024
19-Mar-2024
02-Apr-2024
05-Mar-2024
12-Mar-2024
19-Mar-2024
09-Apr-2024
26-Mar-2024
19-Mar-2024
01-Mar-2024
26-Mar-2024

## 2024-04-10 NOTE — BH INPATIENT PSYCHIATRY PROGRESS NOTE - NSBHATTESTBILLING_PSY_A_CORE
43272-Cewahkouzy OBS or IP - moderate complexity OR 35-49 mins
11818-Ioupmaqdlk OBS or IP - low complexity OR 25-34 mins
63119-Inyeilrwxi OBS or IP - moderate complexity OR 35-49 mins
46068-Rkbbcdbxaz OBS or IP - low complexity OR 25-34 mins
19304-Dblzsvvfbk OBS or IP - low complexity OR 25-34 mins
38268-Jjkdgklkgk OBS or IP - high complexity OR 50-79 mins
63020-Xqtgkivyji OBS or IP - moderate complexity OR 35-49 mins
57255-Tjibhtwlef OBS or IP - low complexity OR 25-34 mins
19796-Rfzdokpmof OBS or IP - low complexity OR 25-34 mins
19675-Uidupakuad OBS or IP - low complexity OR 25-34 mins
49083-Aoforwqdjl OBS or IP - moderate complexity OR 35-49 mins
03406-Ocnqpzgdnd OBS or IP - low complexity OR 25-34 mins
28232-Enijrkcecv OBS or IP - low complexity OR 25-34 mins
03482-Rbplreujyq OBS or IP - moderate complexity OR 35-49 mins
47524-Jbsrmyblfb OBS or IP - moderate complexity OR 35-49 mins
02404-Difvftlxqs OBS or IP - low complexity OR 25-34 mins
44380-Vcrkazteeu OBS or IP - low complexity OR 25-34 mins
51600-Rmpjttfbxg OBS or IP - high complexity OR 50-79 mins
65325-Uqkfihwwoc OBS or IP - low complexity OR 25-34 mins
94543-Vtbfnbkmtg OBS or IP - low complexity OR 25-34 mins
35412-Hwfufxquic OBS or IP - low complexity OR 25-34 mins
43651-Rmfmponecg OBS or IP - low complexity OR 25-34 mins
71947-Ivgwglityk OBS or IP - low complexity OR 25-34 mins
07758-Cxrnxgufkn OBS or IP - low complexity OR 25-34 mins
94919-Gciwjicjsp OBS or IP - low complexity OR 25-34 mins
18363-Btrzedlyqa OBS or IP - high complexity OR 50-79 mins
45845-Kdyuyvjbrk OBS or IP - moderate complexity OR 35-49 mins

## 2024-04-10 NOTE — BH INPATIENT PSYCHIATRY PROGRESS NOTE - NSTXSUBMISDATETRGT_PSY_ALL_CORE
12-Mar-2024
02-Apr-2024
06-Apr-2024
05-Mar-2024
26-Mar-2024
05-Mar-2024
26-Mar-2024
19-Mar-2024
19-Mar-2024
26-Mar-2024
12-Mar-2024
06-Apr-2024
12-Mar-2024
16-Apr-2024
26-Mar-2024
19-Mar-2024
26-Mar-2024
06-Apr-2024
05-Mar-2024
12-Mar-2024
06-Apr-2024
19-Mar-2024
26-Mar-2024
02-Apr-2024
16-Apr-2024
12-Mar-2024
19-Mar-2024
05-Mar-2024
26-Mar-2024
06-Apr-2024

## 2024-04-10 NOTE — BH INPATIENT PSYCHIATRY PROGRESS NOTE - NSBHMSEMOVE_PSY_A_CORE
Other
No abnormal movements/Other
No abnormal movements/Other
Other
No abnormal movements/Other

## 2024-04-10 NOTE — BH INPATIENT PSYCHIATRY PROGRESS NOTE - NSBHMSETHTPROC_PSY_A_CORE
Linear/Normal reasoning
Linear
Linear/Normal reasoning
Linear/Normal reasoning
Unable to assess
Linear
Perseverative
Linear

## 2024-04-10 NOTE — BH INPATIENT PSYCHIATRY PROGRESS NOTE - NSBHLEGALSTATUSCHANGE_PSY_ALL_CORE
No
Yes...
Yes...
No
No
Yes...
Yes...
No
Yes...
No
No
Yes...
Yes...
No
No
Yes...
Yes...
No
Yes...
No
No
Yes...
No
No
Yes...
No

## 2024-04-10 NOTE — BH INPATIENT PSYCHIATRY PROGRESS NOTE - NSTXDEPRESDATETRGT_PSY_ALL_CORE
19-Mar-2024
26-Mar-2024
05-Mar-2024
09-Apr-2024
12-Mar-2024
09-Apr-2024
12-Mar-2024
05-Mar-2024
02-Apr-2024
02-Apr-2024
09-Apr-2024
12-Mar-2024
09-Apr-2024
02-Apr-2024
12-Mar-2024
16-Apr-2024
26-Mar-2024
19-Mar-2024
12-Mar-2024
02-Apr-2024
19-Mar-2024
02-Apr-2024
26-Mar-2024
09-Apr-2024
19-Mar-2024
19-Mar-2024
16-Apr-2024
05-Mar-2024
05-Mar-2024
26-Mar-2024

## 2024-04-10 NOTE — BH INPATIENT PSYCHIATRY PROGRESS NOTE - NSBHMSEMUSCLE_PSY_A_CORE
Unable to assess
Normal muscle tone/strength
Normal muscle tone/strength
Other
Other
Unable to assess
Unable to assess
Other

## 2024-04-10 NOTE — BH INPATIENT PSYCHIATRY PROGRESS NOTE - NSTXPSYCHODATEEST_PSY_ALL_CORE
08-Mar-2024
08-Mar-2024
01-Mar-2024
08-Mar-2024
09-Apr-2024
08-Mar-2024
01-Mar-2024
01-Mar-2024
08-Mar-2024
01-Mar-2024
01-Mar-2024
08-Mar-2024
09-Apr-2024
01-Mar-2024
01-Mar-2024
08-Mar-2024
08-Mar-2024

## 2024-04-10 NOTE — BH INPATIENT PSYCHIATRY PROGRESS NOTE - NSBHCONSULTIPREASON_PSY_A_CORE
danger to self; mental illness expected to respond to inpatient care

## 2024-04-10 NOTE — BH INPATIENT PSYCHIATRY PROGRESS NOTE - NSTXPSYCHODATETRGT_PSY_ALL_CORE
22-Mar-2024
22-Mar-2024
15-Mar-2024
06-Apr-2024
15-Mar-2024
29-Mar-2024
16-Apr-2024
29-Mar-2024
08-Mar-2024
06-Apr-2024
29-Mar-2024
08-Mar-2024
16-Apr-2024
08-Mar-2024
06-Apr-2024
06-Apr-2024
08-Mar-2024
15-Mar-2024
22-Mar-2024
29-Mar-2024
08-Mar-2024
22-Mar-2024
29-Mar-2024
06-Apr-2024
22-Mar-2024
29-Mar-2024
08-Mar-2024
08-Mar-2024

## 2024-04-10 NOTE — BH INPATIENT PSYCHIATRY PROGRESS NOTE - NSBHCONSBHPROVCNTCTNOFT_PSY_A_CORE
Patient not connected to outpatient psychiatrist.  Documents received from [facility], where she was before admission to [facility].
Patient not connected to outpatient psychiatrist.  Documents received from [facility], where she was before admission to [facility].
Patient not connected to outpatient psychiatrist.  Documents received from [facility], where she was before admission to [facility].
Patient not connected to outpatient psychiatrist.  Documents received from [facility], where she was before admission to [facility].
Patient not connected to outpatient psychiatrist.  Documents received from [facility], where she was before admission to [facility].
Patient not connected to outpatient psychiatrist.  Documents received from [facility], where she was before admission to [facility].
Patient not connected to outpatient psychiatrist.  Documents received from [facility], where she was before admission to [facility].
Patient not connected to outpatient psychiatrist.  Documents received from [facility], where she was before admission to [facility].
Patient not connected to outpatient psychiatrist.  Documents received from [facility], where she was before admission to [facility].
Patient not connected to outpatient psychiatrist.  Documents received from [facility], where she was before admission to [facility].
Patient not connected to outpatient psychiatrist.  Documents received from [facility], where she was before admission to [facility].
Patient not connected to outpatient psychiatrist.  Documents received from [facility], where she was before admission to [facility].
Patient not connected to outpatient psychiatrist.  Documents received from [facility], where she was before admission to [facility].
Patient not connected to outpatient psychiatrist.  Documents received from [facility], where she was before admission to [facility].
Patient not connected to outpatient psychiatrist.  Documents received from [facility], where she was before admission to [facility].
Patient not connected to outpatient psychiatrist.  Documents received from [facility], where she was before admission to [facility].
Patient not connected to outpatient psychiatrist.  Documents received from [facility], where she was before admission to [facility].
Patient not connected to outpatient psychiatrist.  Documents received from [facility], where she was before admission to [facility].
Patient not connected to outpatient psychiatrist.  Documents received from [facility], where she was before admission to [facility].
Patient not connected to outpatient psychiatrist.  Documents received from [facility], where she was before admission to [facility].
Patient not connected to outpatient psychiatrist.  Documents received from [facility], where she was before admission to [facility].
Patient not connected to outpatient psychiatrist.  Documents received from [facility], where she was before admission to [facility].
Patient not connected to outpatient psychiatrist.  Documents received from [facility], where she was before admission to [facility].
Patient not connected to outpatient psychiatrist.  Documents received from [facility], where she was before admission to [facility].
Patient not connected to outpatient psychiatrist.  Documents received from [facility], where she was before admission to [facility].
Patient not connected to outpatient psychiatrist.  Documents received from [facility], where she was before admission to [facility].
Patient not connected to outpatient psychiatrist.  Documents received from [facility], where she was before admission to [facility].
Patient not connected to outpatient psychiatrist.  Documents received from [facility], where she was before admission to [facility].
Patient not connected to outpatient psychiatrist.  Documents received from [facility], where she was before admission to [facility].
Patient not connected to outpatient psychiatrist.  Documents received from [facility], where she was before admission to [facility].

## 2024-04-10 NOTE — BH INPATIENT PSYCHIATRY PROGRESS NOTE - NSTXPSYCHOGOAL_PSY_ALL_CORE
Make at least 5 reality based statements/requests to staff and/or peers

## 2024-04-10 NOTE — BH INPATIENT PSYCHIATRY PROGRESS NOTE - NSBHMSEPERCEPT_PSY_A_CORE
No abnormalities
Other
Other

## 2024-04-10 NOTE — BH INPATIENT PSYCHIATRY PROGRESS NOTE - NSTXPSYCHODATENEW_PSY_ALL_CORE
06-Apr-2024

## 2024-04-10 NOTE — BH INPATIENT PSYCHIATRY PROGRESS NOTE - NSBHMSEGAIT_PSY_A_CORE
Abnormal gait / station
Normal gait / station
Abnormal gait / station
Normal gait / station
Other
Other
Abnormal gait / station
Normal gait / station

## 2024-04-10 NOTE — BH INPATIENT PSYCHIATRY PROGRESS NOTE - NSTXDEPRESGOALOTHER_PSY_ALL_CORE
Pt will participate in groups and in the milieu treatment structure of the unit
Pt will participate in groups and milieu treatment structure of the unit
Pt will participate in groups and in the milieu treatment structure of the unit
Pt will participate in groups and milieu treatment structure of the unit
Pt. will participate in groups and the milieu treatment structure of the unit.
Pt will participate in groups and milieu treatment structure of the unit
Pt. will participate in groups and the milieu treatment structure of the unit.
Pt will participate in groups and in the milieu treatment structure of the unit
Pt. will participate in groups and the milieu treatment structure of the unit.
Pt will participate in groups and milieu treatment structure of the unit
Pt will participate in groups and milieu treatment structure of the unit
Pt. will participate in groups and the milieu treatment structure of the unit.
Pt will participate in groups and milieu treatment structure of the unit
pt will comply with medications.
Pt will participate in groups and milieu treatment structure of the unit
Pt will participate in groups and milieu treatment structure of the unit
Pt. will participate in groups and the milieu treatment structure of the unit.
Pt will participate in groups and milieu treatment structure of the unit
Pt will participate in groups and in the milieu treatment structure of the unit
Pt will participate in groups and in the milieu treatment structure of the unit
Pt will participate in groups and milieu treatment structure of the unit
Pt will participate in groups and in the milieu treatment structure of the unit

## 2024-04-10 NOTE — BH INPATIENT PSYCHIATRY PROGRESS NOTE - NSTXSUBMISDATEEST_PSY_ALL_CORE
19-Mar-2024
01-Mar-2024
05-Mar-2024
12-Mar-2024
12-Mar-2024
19-Mar-2024
26-Mar-2024
01-Mar-2024
12-Mar-2024
19-Mar-2024
05-Mar-2024
05-Mar-2024
09-Apr-2024
19-Mar-2024
12-Mar-2024
26-Mar-2024
26-Mar-2024
12-Mar-2024
01-Mar-2024
19-Mar-2024
19-Mar-2024
26-Mar-2024
26-Mar-2024
05-Mar-2024
05-Mar-2024
26-Mar-2024
01-Mar-2024
09-Apr-2024
19-Mar-2024
26-Mar-2024

## 2024-04-10 NOTE — BH INPATIENT PSYCHIATRY PROGRESS NOTE - PRN MEDS
MEDICATIONS  (PRN):  acetaminophen     Tablet .. 650 milliGRAM(s) Oral every 6 hours PRN Mild Pain (1 - 3), Moderate Pain (4 - 6)  albuterol    90 MICROgram(s) HFA Inhaler 2 Puff(s) Inhalation every 6 hours PRN Shortness of Breath and/or Wheezing  aluminum hydroxide/magnesium hydroxide/simethicone Suspension 30 milliLiter(s) Oral every 6 hours PRN Dyspepsia  meclizine 12.5 milliGRAM(s) Oral every 6 hours PRN Dizziness  melatonin. 3 milliGRAM(s) Oral at bedtime PRN Insomnia  ondansetron   Disintegrating Tablet 4 milliGRAM(s) Oral every 6 hours PRN Nausea and/or Vomiting

## 2024-04-10 NOTE — BH INPATIENT PSYCHIATRY PROGRESS NOTE - NSCGIIMPROVESX_PSY_ALL_CORE
4 = No change - symptoms remain essentially unchanged
6 = Much worse - clinically significant increase in symptoms and diminished functioning
4 = No change - symptoms remain essentially unchanged
3 = Minimally improved - slightly better with little or no clinically meaningful reduction of symptoms.  Represents very little change in basic clinical status, level of care, or functional capacity.
4 = No change - symptoms remain essentially unchanged

## 2024-04-10 NOTE — BH INPATIENT PSYCHIATRY PROGRESS NOTE - NSBHMSEATTEN_PSY_A_CORE
Quality 130: Documentation Of Current Medications In The Medical Record: Current Medications Documented
Quality 131: Pain Assessment And Follow-Up: Pain assessment using a standardized tool is documented as negative, no follow-up plan required
Detail Level: Generalized
Additional Notes: Pain 0/10
Normal
Impaired
Normal

## 2024-04-10 NOTE — BH INPATIENT PSYCHIATRY PROGRESS NOTE - NSBHCONSDANGERSELF_PSY_A_CORE
unable to care for self

## 2024-04-10 NOTE — BH INPATIENT PSYCHIATRY PROGRESS NOTE - NSBHCHARTREVIEWVS_PSY_A_CORE FT
Vital Signs Last 24 Hrs  T(C): 36.5 (04-10-24 @ 17:07), Max: 36.5 (04-10-24 @ 17:07)  T(F): 97.7 (04-10-24 @ 17:07), Max: 97.7 (04-10-24 @ 17:07)  HR: 99 (04-10-24 @ 17:07) (80 - 99)  BP: 109/74 (04-10-24 @ 17:07) (109/74 - 120/74)  BP(mean): --  RR: --  SpO2: 99% (04-10-24 @ 17:07) (99% - 100%)

## 2024-04-10 NOTE — BH INPATIENT PSYCHIATRY PROGRESS NOTE - NSTXDEPRESDATENEW_PSY_ALL_CORE
26-Mar-2024

## 2024-04-10 NOTE — BH INPATIENT PSYCHIATRY PROGRESS NOTE - NSBHMETABOLIC_PSY_ALL_CORE_FT
BMI: BMI (kg/m2): 27.3 (03-04-24 @ 21:11)  HbA1c:   Glucose:   BP: 109/74 (04-10-24 @ 17:07) (108/73 - 125/82)Vital Signs Last 24 Hrs  T(C): 36.5 (04-10-24 @ 17:07), Max: 36.5 (04-10-24 @ 17:07)  T(F): 97.7 (04-10-24 @ 17:07), Max: 97.7 (04-10-24 @ 17:07)  HR: 99 (04-10-24 @ 17:07) (80 - 99)  BP: 109/74 (04-10-24 @ 17:07) (109/74 - 120/74)  BP(mean): --  RR: --  SpO2: 99% (04-10-24 @ 17:07) (99% - 100%)      Lipid Panel:

## 2024-04-10 NOTE — BH INPATIENT PSYCHIATRY PROGRESS NOTE - NSTXDEPRESINTERMD_PSY_ALL_CORE
Ativan for catatonia;  SSRI for mood; psychopharmacology 15 minutes a day 

## 2024-04-10 NOTE — BH INPATIENT PSYCHIATRY PROGRESS NOTE - NSBHMSELANG_PSY_A_CORE
No abnormalities noted/Unable to assess
Unable to assess
No abnormalities noted
No abnormalities noted/Unable to assess
Unable to assess
No abnormalities noted/Unable to assess

## 2024-04-10 NOTE — BH INPATIENT PSYCHIATRY PROGRESS NOTE - NSBHATTESTTYPEVISIT_PSY_A_CORE
Attending Only
Attending Only
On-site Attending supervising JENNIFER (99XXX codes)
Attending Only
Attending with Resident/Fellow/Student
Attending Only
Attending with Resident/Fellow/Student
Attending Only
Attending with Resident/Fellow/Student

## 2024-04-10 NOTE — BH INPATIENT PSYCHIATRY PROGRESS NOTE - NSTXSUBMISPROGRES_PSY_ALL_CORE
Improving
No Change
No Change
Improving
No Change
Improving
Improving
No Change
No Change
Improving
No Change
Improving
No Change
No Change
Improving
No Change
Improving
No Change

## 2024-04-10 NOTE — BH INPATIENT PSYCHIATRY PROGRESS NOTE - NSDCCRITERIA_PSY_ALL_CORE
Improvement of catatonia, improvement in mood, medication adherence, improvement in appetite

## 2024-04-10 NOTE — BH INPATIENT PSYCHIATRY PROGRESS NOTE - NSTXDEPRESGOAL_PSY_ALL_CORE
Other...

## 2024-04-10 NOTE — BH INPATIENT PSYCHIATRY PROGRESS NOTE - NSTXSUBMISDATENEW_PSY_ALL_CORE
06-Apr-2024
26-Mar-2024
06-Apr-2024
26-Mar-2024
26-Mar-2024
06-Apr-2024
26-Mar-2024
06-Apr-2024
06-Apr-2024
26-Mar-2024
06-Apr-2024

## 2024-04-10 NOTE — BH INPATIENT PSYCHIATRY PROGRESS NOTE - NSBHCONTPROVIDER_PSY_ALL_CORE
No...

## 2024-04-10 NOTE — BH INPATIENT PSYCHIATRY PROGRESS NOTE - NSTXPSYCHOPROGRES_PSY_ALL_CORE
Improving
No Change
No Change
Improving
No Change
No Change
Improving
No Change
No Change
Improving
No Change
Improving
No Change
Improving
No Change
No Change
Improving
Improving
No Change
Improving
No Change
Improving

## 2024-04-10 NOTE — BH INPATIENT PSYCHIATRY PROGRESS NOTE - CURRENT MEDICATION
MEDICATIONS  (STANDING):  FLUoxetine Solution 20 milliGRAM(s) Oral daily  haloperidol    Injectable 5 milliGRAM(s) IntraMuscular at bedtime  LORazepam     Tablet 2 milliGRAM(s) Oral three times a day  LORazepam   Injectable 2 milliGRAM(s) IntraMuscular three times a day  paliperidone ER 3 milliGRAM(s) Oral at bedtime    MEDICATIONS  (PRN):  acetaminophen     Tablet .. 650 milliGRAM(s) Oral every 6 hours PRN Mild Pain (1 - 3), Moderate Pain (4 - 6)  albuterol    90 MICROgram(s) HFA Inhaler 2 Puff(s) Inhalation every 6 hours PRN Shortness of Breath and/or Wheezing  aluminum hydroxide/magnesium hydroxide/simethicone Suspension 30 milliLiter(s) Oral every 6 hours PRN Dyspepsia  meclizine 12.5 milliGRAM(s) Oral every 6 hours PRN Dizziness  melatonin. 3 milliGRAM(s) Oral at bedtime PRN Insomnia  ondansetron   Disintegrating Tablet 4 milliGRAM(s) Oral every 6 hours PRN Nausea and/or Vomiting

## 2024-04-10 NOTE — BH INPATIENT PSYCHIATRY PROGRESS NOTE - NSTXSUBMISGOALOTHER_PSY_ALL_CORE
Pt will attend 12 step groups weekly
Pt. will attend 12-step groups when a speaker is on the unit.
Pt will attend 12 step meetings
Pt will attend 12 steps weekly
pt will comply with medications.
Pt. will attend 12-step groups when a speaker is on the unit.
Pt. will attend 12-step groups when a speaker is on the unit.
Pt will attend 12 step groups weekly
Pt will attend 12-Step groups weekly
Pt will attend 12 step meetings
Pt will attend 12 step meetings
Pt will attend 12-Step groups weekly
Pt will attend 12-Step groups weekly
Pt will attend 12 step meetings
Pt will attend 12-Step meetings weekly
Pt will attend 12-Step meetings weekly
Pt will attend 12 step meetings
Pt will attend 12 step meetings
Pt will attend 12-Step meetings weekly
Pt will attend 12-Step groups weekly
Pt. will attend 12-step groups when a speaker is on the unit.
Pt. will attend 12-step groups when a speaker is on the unit.
Pt will attend 12-Step meetings weekly
Pt will attend 12 steps weekly
Pt will attend 12-Step meetings weekly
Pt will attend 12 step meetings
Pt will attend 12-Step meetings weekly
Pt will attend 12 step meetings

## 2024-04-10 NOTE — BH INPATIENT PSYCHIATRY PROGRESS NOTE - NSBHMSESPEECH_PSY_A_CORE
Normal volume, rate, productivity, spontaneity and articulation
Abnormal as indicated, otherwise normal...
Normal volume, rate, productivity, spontaneity and articulation
Normal volume, rate, productivity, spontaneity and articulation
Non-verbal: unable to assess speech further
Abnormal as indicated, otherwise normal...

## 2024-04-10 NOTE — BH INPATIENT PSYCHIATRY PROGRESS NOTE - NSBHPSYCHOLCOGORIENT_PSY_A_CORE
Oriented to time, place, person, situation
Unable to assess
Oriented to time, place, person, situation
Unable to assess

## 2024-04-10 NOTE — BH INPATIENT PSYCHIATRY PROGRESS NOTE - NSTXDEPRESPROGRES_PSY_ALL_CORE
No Change
Improving
No Change
Improving
No Change
Improving
No Change
Improving
No Change
No Change
Improving
Improving
No Change
Improving
No Change
No Change
Improving
Improving

## 2024-04-11 VITALS
OXYGEN SATURATION: 98 % | DIASTOLIC BLOOD PRESSURE: 74 MMHG | SYSTOLIC BLOOD PRESSURE: 115 MMHG | HEART RATE: 97 BPM | TEMPERATURE: 99 F

## 2024-04-11 PROCEDURE — 97530 THERAPEUTIC ACTIVITIES: CPT

## 2024-04-11 PROCEDURE — 99239 HOSP IP/OBS DSCHRG MGMT >30: CPT

## 2024-04-11 PROCEDURE — 97116 GAIT TRAINING THERAPY: CPT

## 2024-04-11 RX ORDER — PALIPERIDONE 1.5 MG/1
1 TABLET, EXTENDED RELEASE ORAL
Qty: 0 | Refills: 0 | DISCHARGE
Start: 2024-04-11

## 2024-04-11 RX ORDER — ALBUTEROL 90 UG/1
2 AEROSOL, METERED ORAL
Qty: 1 | Refills: 0
Start: 2024-04-11 | End: 2024-05-10

## 2024-04-11 RX ORDER — FLUOXETINE HCL 10 MG
5 CAPSULE ORAL
Qty: 150 | Refills: 0
Start: 2024-04-11 | End: 2024-05-10

## 2024-04-11 RX ORDER — IBUPROFEN 200 MG
400 TABLET ORAL ONCE
Refills: 0 | Status: COMPLETED | OUTPATIENT
Start: 2024-04-11 | End: 2024-04-11

## 2024-04-11 RX ORDER — ALBUTEROL 90 UG/1
2 AEROSOL, METERED ORAL
Qty: 0 | Refills: 0 | DISCHARGE
Start: 2024-04-11

## 2024-04-11 RX ORDER — LANOLIN ALCOHOL/MO/W.PET/CERES
1 CREAM (GRAM) TOPICAL
Qty: 0 | Refills: 0 | DISCHARGE
Start: 2024-04-11

## 2024-04-11 RX ORDER — LANOLIN ALCOHOL/MO/W.PET/CERES
1 CREAM (GRAM) TOPICAL
Qty: 30 | Refills: 0
Start: 2024-04-11 | End: 2024-05-10

## 2024-04-11 RX ORDER — PALIPERIDONE 1.5 MG/1
1 TABLET, EXTENDED RELEASE ORAL
Qty: 30 | Refills: 0
Start: 2024-04-11 | End: 2024-05-10

## 2024-04-11 RX ADMIN — Medication 20 MILLIGRAM(S): at 10:15

## 2024-04-11 RX ADMIN — Medication 400 MILLIGRAM(S): at 15:16

## 2024-04-11 RX ADMIN — Medication 2 MILLIGRAM(S): at 10:14

## 2024-04-11 RX ADMIN — Medication 2 MILLIGRAM(S): at 15:15

## 2024-04-11 NOTE — BH INPATIENT PSYCHIATRY DISCHARGE NOTE - NSBHMSEATTEN_PSY_A_CORE
Normal Cimzia Pregnancy And Lactation Text: This medication crosses the placenta but can be considered safe in certain situations. Cimzia may be excreted in breast milk.
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
## 2024-04-11 NOTE — BH INPATIENT PSYCHIATRY DISCHARGE NOTE - NSDCMRMEDTOKEN_GEN_ALL_CORE_FT
albuterol 90 mcg/inh inhalation aerosol: 2 puff(s) inhaled every 6 hours As needed Shortness of Breath and/or Wheezing  FLUoxetine 20 mg/5 mL oral solution: 5 milliliter(s) orally once a day  LORazepam 2 mg oral tablet: 1 tab(s) orally 3 times a day  melatonin 3 mg oral tablet: 1 tab(s) orally once a day (at bedtime) As needed Insomnia  paliperidone 3 mg oral tablet, extended release: 1 tab(s) orally once a day (at bedtime)
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
## 2024-04-11 NOTE — BH INPATIENT PSYCHIATRY DISCHARGE NOTE - NSBHMETABOLIC_PSY_ALL_CORE_FT
BMI: BMI (kg/m2): 27.3 (03-04-24 @ 21:11)  HbA1c: 4.7  Glucose:   BP: 115/74 (04-11-24 @ 08:08) (108/73 - 125/82)Vital Signs Last 24 Hrs  T(C): 37.3 (04-11-24 @ 08:08), Max: 37.3 (04-11-24 @ 08:08)  T(F): 99.1 (04-11-24 @ 08:08), Max: 99.1 (04-11-24 @ 08:08)  HR: 97 (04-11-24 @ 08:08) (97 - 99)  BP: 115/74 (04-11-24 @ 08:08) (109/74 - 115/74)  BP(mean): --  RR: --  SpO2: 98% (04-11-24 @ 08:08) (98% - 99%)      Lipid Panel: Total chol- 147. trig-57, HDL-47, LDL-89

## 2024-04-11 NOTE — BH INPATIENT PSYCHIATRY DISCHARGE NOTE - HOSPITAL COURSE
Pt had several weeks of catatonia and was often refusing medications. Treatment over objection was pursued. Pt kept refusing po meds intermittently for several weeks but ultimately did start taking PO meds. It took her even longer to improve during this hospitalization (over a month) than last hospitalization. Pt really dislikes having to take medications but finally came to admit they had helped her a lot. Pt still refusing RUANO Invega. She agreed to ACT Team. Team met with mother on several occasions and explained treatment, prognosis, and future options. We discussed how if pt were to be readmitted we would likely do RUANO and AOT. ECT could also be considered. Ativan for catatonia was the cornerstone of the treatment and prozac 20 mg liquid also continued (pt prefers liquid for prozac). Invega added for possible component of psychosis although we have not appreciated internal preoccupation, paranoia, or delusions. Disorganization of behavior, thought process, and negative sx would be the extent of any psychotic sx.     Pt should continue taking current meds until directed otherwise by her outpatient provider. 30 day supply of all meds provided.     albuterol 90 mcg/inh inhalation aerosol: 2 puff(s) inhaled every 6 hours As needed Shortness of Breath and/or Wheezing, to start on 4/11  FLUoxetine 20 mg/5 mL oral solution: 5 milliliter(s) orally once a day for depression to start on 4/12  LORazepam 2 mg oral tablet: 1 tab(s) orally 3 times a day for catatonia to start on 4/11  melatonin 3 mg oral tablet: 1 tab(s) orally once a day (at bedtime) As needed Insomnia to start on 4/11  paliperidone 3 mg oral tablet, extended release: 1 tab(s) orally once a day (at bedtime) for psychosis to start on 4/11    No prescription necessary for substance use.

## 2024-04-11 NOTE — BH INPATIENT PSYCHIATRY DISCHARGE NOTE - NSBHFUPINTERVALHXFT_PSY_A_CORE
Pt had several weeks of catatonia and was often refusing medications. Treatment over objection was pursued. Pt kept refusing po meds intermittently for several weeks but ultimately did start taking PO meds. It took her even longer to improve during this hospitalization (over a month) than last hospitalization. Pt really dislikes having to take medications but finally came to admit they had helped her a lot. Pt still refusing RUANO Invega. She agreed to ACT Team. Team met with mother on several occasions and explained treatment, prognosis, and future options. We discussed how if pt were to be readmitted we would likely do RUANO and AOT. ECT could also be considered.

## 2024-04-11 NOTE — BH INPATIENT PSYCHIATRY DISCHARGE NOTE - OTHER PAST PSYCHIATRIC HISTORY (INCLUDE DETAILS REGARDING ONSET, COURSE OF ILLNESS, INPATIENT/OUTPATIENT TREATMENT)
Patient is a 23 year old female, single, domiciled with mother and two siblings, unemployed (formerly employed as a HHA, , and school para), with PMH of asthma and PPH of MDD w/ catatonic and psychotic features, ODD, and cannabis use and prior admission to [facility] (7/17/22-8/22/22 discharged on Ativan for catatonia and Prozac for depressed mood) who was brought in by mother to [facility] before transfer to [facility] for tearfulness, decreased activity, and decreased appetite. She was admitted on 3/1/24 for MDD with catatonic features.

## 2024-04-11 NOTE — BH INPATIENT PSYCHIATRY DISCHARGE NOTE - HPI (INCLUDE ILLNESS QUALITY, SEVERITY, DURATION, TIMING, CONTEXT, MODIFYING FACTORS, ASSOCIATED SIGNS AND SYMPTOMS)
Ms. 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 (7/17/22-8/22/22 discharged on Ativan for catatonia and Prozac for depressed mood) who presented brought in by mother to [facility] before transfer to [facility] for tearfulness, decreased activity, and decreased appetite.     The patient is interviewed at bedside.  She is reclining in bed with her back relaxed against the back wall and her arms folded across her stomach.  She is calm, dysphoric, makes intermittent eye contact (overall quality low), has speech latency, often replies with head nods yes/no, and has poverty of speech, sometimes requiring questions to be re-asked.    The patient reports mood as "okay," but then later mentions she is "sad." She reports she does not remember how she ended up in the hospital, saying that she just remembers "being dizzy" and "sleeping a lot."  She says at home she spent most of her time pacing before coming to the hospital.  She cannot identify other task, activities, or jobs she did just prior to admission to the hospital.  She is unable to provide a trigger or life stressor that prompted her to start feeling sad and dizzy.  However, when pressed about relationships, she does say she recently broke up with her boyfriend.  Notably, when mentioning her ex-boyfriend she become more closed off in body posture and avoided eye contact by staring down.    She confirms depressed mood, but denies anxiety or manic symptoms.  She reports decrease eating and increased sleeping.  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  Denies current and past suicidal ideation and self-harm.  Denies current and past HI.  Reports history of AH, but denies VH.  Says she is not have AH, but did in the past, well before the present admission.  She does not provide specifics on the quality of the AH when pressed.  States that she uses cannabis daily, and alcohol (~2 drinks), but denies other substance use. Reports she does not follow with a psychiatrist and was prior admitted to [facility] but did not follow-up with any provider or take the medications prescribed to her.     Later, about an hour after the patient received Ativan 1mg intramuscular for catatonia, the patient is more talkative, initiating questions about the medication.  She is sitting upright on the side of the bed and reports feeling "better," while denying dizziness and stomach aches.  She also has a tube of vaseline in her hand and is applying it to dry skin.  Her eye contact is also improved and speech latency absent.

## 2024-04-11 NOTE — BH INPATIENT PSYCHIATRY DISCHARGE NOTE - NSDCCPCAREPLAN_GEN_ALL_CORE_FT
PRINCIPAL DISCHARGE DIAGNOSIS  Diagnosis: MDD (major depressive disorder), recurrent, with catatonic features  Assessment and Plan of Treatment:

## 2024-04-11 NOTE — BH INPATIENT PSYCHIATRY DISCHARGE NOTE - REASON FOR ADMISSION
22 YO F well known to  comes with worsening depression and catatonic features. She had improved on prozac and ativan in the past.

## 2024-04-12 NOTE — BH SOCIAL WORK CONFIRMATION FOLLOW UP NOTE - NSCOMMENTS_PSY_ALL_CORE
Caring Call 4/12/24: Pt low SI risk thus, no call needed.  Caring Call 4/12/24: Pt low SI risk thus, no call needed.     Linkage Call 4/18/24: SW spoke with Marielena from [facility] who stated pt rescheduled appointment to 4/19 @ 2:20pm with provider Lynne Howard LMSW. SW to follow up.     [facility]  18-Apr-2024 14:00  [address], [address] [place], [place] [id number] [phone]  Mental Health Treatment   Intake appointment scheduled for Thursday, April 18th at 2pm. This is a VIRTUAL appointment (with option for in person). Please complete intake forms sent to email [email or web address]     Caring Call 4/12/24: Pt low SI risk thus, no call needed.     Linkage Call 4/18/24: SW spoke with Marielena from [facility] who stated pt rescheduled appointment to 4/19 @ 2:20pm with provider Lynne Howard LMSW. SW to follow up.     [facility]  18-Apr-2024 14:00  [address], [address] [place], [place] [id number] [phone]  Mental Health Treatment   Intake appointment scheduled for Thursday, April 18th at 2pm. This is a VIRTUAL appointment (with option for in person). Please complete intake forms sent to email [email or web address]    4/19/24: Pt no showed appt.

## 2024-04-15 DIAGNOSIS — Z56.0 UNEMPLOYMENT, UNSPECIFIED: ICD-10-CM

## 2024-04-15 DIAGNOSIS — J45.909 UNSPECIFIED ASTHMA, UNCOMPLICATED: ICD-10-CM

## 2024-04-15 DIAGNOSIS — F06.1 CATATONIC DISORDER DUE TO KNOWN PHYSIOLOGICAL CONDITION: ICD-10-CM

## 2024-04-15 DIAGNOSIS — F33.3 MAJOR DEPRESSIVE DISORDER, RECURRENT, SEVERE WITH PSYCHOTIC SYMPTOMS: ICD-10-CM

## 2024-04-15 DIAGNOSIS — Z53.29 PROCEDURE AND TREATMENT NOT CARRIED OUT BECAUSE OF PATIENT'S DECISION FOR OTHER REASONS: ICD-10-CM

## 2024-04-15 DIAGNOSIS — F12.90 CANNABIS USE, UNSPECIFIED, UNCOMPLICATED: ICD-10-CM

## 2024-04-15 DIAGNOSIS — F94.0 SELECTIVE MUTISM: ICD-10-CM

## 2024-04-15 DIAGNOSIS — F33.9 MAJOR DEPRESSIVE DISORDER, RECURRENT, UNSPECIFIED: ICD-10-CM

## 2024-04-15 SDOH — ECONOMIC STABILITY - INCOME SECURITY: UNEMPLOYMENT, UNSPECIFIED: Z56.0

## 2024-08-01 NOTE — BH PATIENT PROFILE - NSDASAPHYSSTAFF_PSY_ALL_CORE
Patient returned phone call;  confirmed. Patient reports severe headache last night accompanied by blood pressures 150s/80s. She denies visual changes and epigastric pain. She reports good fetal movement. Regarding shortness of breath, patient states she is finding it is taking longer for shortness of breath after exertion to resolve with rest. Patient advised to go to OB ED today for assessment and patient agrees with plan.   No

## 2025-04-07 NOTE — BH INPATIENT PSYCHIATRY PROGRESS NOTE - NSBHMSEKNOW_PSY_A_CORE
SUBJECTIVE/OVERNIGHT EVENTS  Today is hospital day 14d. This morning patient was seen and examined at bedside, resting comfortably in bed. No acute or major events overnight.  Patient satting well on 3L NC (on O2 at baseline), no signs of bleeding, no pain in area of thigh hematoma, reported diarrhea episode yesterday, will monitor.     MEDICATIONS  STANDING MEDICATIONS  albuterol/ipratropium for Nebulization 3 milliLiter(s) Nebulizer every 6 hours  chlorhexidine 2% Cloths 1 Application(s) Topical daily  chlorhexidine 2% Cloths 1 Application(s) Topical daily  fluticasone propionate/ salmeterol 250-50 MICROgram(s) Diskus 1 Dose(s) Inhalation two times a day  hydrocortisone hemorrhoidal Suppository 1 Suppository(s) Rectal at bedtime  lidocaine   4% Patch 1 Patch Transdermal daily  nicotine -   7 mG/24Hr(s) Patch 1 Patch Transdermal daily  pantoprazole  Injectable 40 milliGRAM(s) IV Push daily  polyethylene glycol 3350 17 Gram(s) Oral two times a day  senna 2 Tablet(s) Oral at bedtime    PRN MEDICATIONS  acetaminophen     Tablet .. 650 milliGRAM(s) Oral every 6 hours PRN  oxycodone    5 mG/acetaminophen 325 mG 1 Tablet(s) Oral every 12 hours PRN    VITALS  T(F): 97.9 (04-07-25 @ 04:00), Max: 97.9 (04-07-25 @ 04:00)  HR: 68 (04-07-25 @ 04:00) (62 - 74)  BP: 94/53 (04-07-25 @ 04:00) (83/57 - 94/53)  RR: 18 (04-06-25 @ 12:12) (18 - 18)  SpO2: 94% (04-07-25 @ 04:00) (94% - 97%)  POCT Blood Glucose.: 119 mg/dL (04-06-25 @ 21:27)    PHYSICAL EXAM  GENERAL  ( x ) NAD, lying in bed comfortably     (  ) obtunded     (  ) lethargic     (  ) somnolent    HEAD  ( x ) Atraumatic     (  ) hematoma     (  ) laceration (specify location:       )     NECK  ( x ) Supple     (  ) neck stiffness     (  ) nuchal rigidity     (  )  no JVD     (  ) JVD present ( -- cm)    HEART  Rate -->  ( x ) normal rate    (  ) bradycardic    (  ) tachycardic  Rhythm -->  ( x ) regular    (  ) regularly irregular    (  ) irregularly irregular  Murmurs -->  ( x ) normal s1/s2    (  ) systolic murmur    (  ) diastolic murmur    (  ) continuous murmur     (  ) S3 present    (  ) S4 present    LUNGS  ( x ) Unlabored respirations     (  ) tachypnea  ( x ) B/L air entry     (  ) decreased breath sounds in:  (location     )    (  ) no adventitious sound     (  ) crackles     (  ) wheezing      (  ) rhonchi      (specify location:       )  (  ) chest wall tenderness (specify location:       )    ABDOMEN  ( x ) Soft     (  ) tense   |   (  ) nondistended     (  ) distended   |   (  ) +BS     (  ) hypoactive bowel sounds     (  ) hyperactive bowel sounds  ( x ) nontender     (  ) RUQ tenderness     (  ) RLQ tenderness     (  ) LLQ tenderness     (  ) epigastric tenderness     (  ) diffuse tenderness  (  ) Splenomegaly      (  ) Hepatomegaly      (  ) Jaundice     (  ) ecchymosis     EXTREMITIES  ( x ) Normal     (  ) Rash     (  ) ecchymosis     (  ) varicose veins      (  ) pitting edema     (  ) non-pitting edema   (  ) ulceration     (  ) gangrene:     (location:     )    NERVOUS SYSTEM  ( x ) A&Ox3     (  ) confused     (  ) lethargic  CN II-XII:     (  ) Intact     (  ) focal deficits  (Specify:     )   Upper extremities:     (  ) strength X/5     (  ) focal deficit (specify:    )  Lower extremities:     (  ) strength  X/5    (  ) focal deficit (specify:    )      LABS             8.2    15.26 )-----------( 222      ( 04-06-25 @ 05:52 )             27.9     140  |  103  |  14  -------------------------<  86   04-06-25 @ 05:52  5.1  |  26  |  0.6    Ca      8.1     04-06-25 @ 05:52  Mg     2.5     04-06-25 @ 05:52    TPro  5.7  /  Alb  3.1  /  TBili  1.1  /  DBili  x   /  AST  25  /  ALT  21  /  AlkPhos  77  /  GGT  x     04-06-25 @ 05:52        Urinalysis Basic - ( 06 Apr 2025 05:52 )    Color: x / Appearance: x / SG: x / pH: x  Gluc: 86 mg/dL / Ketone: x  / Bili: x / Urobili: x   Blood: x / Protein: x / Nitrite: x   Leuk Esterase: x / RBC: x / WBC x   Sq Epi: x / Non Sq Epi: x / Bacteria: x          Culture - Blood (collected 05 Apr 2025 00:54)  Source: Blood Blood  Preliminary Report (07 Apr 2025 06:01):    No growth at 48 Hours      IMAGING Unable to assess